# Patient Record
Sex: FEMALE | Race: WHITE | NOT HISPANIC OR LATINO | Employment: FULL TIME | URBAN - NONMETROPOLITAN AREA
[De-identification: names, ages, dates, MRNs, and addresses within clinical notes are randomized per-mention and may not be internally consistent; named-entity substitution may affect disease eponyms.]

---

## 2017-01-10 ENCOUNTER — OFFICE VISIT (OUTPATIENT)
Dept: OBSTETRICS AND GYNECOLOGY | Facility: CLINIC | Age: 52
End: 2017-01-10

## 2017-01-10 VITALS
WEIGHT: 208 LBS | HEIGHT: 66 IN | BODY MASS INDEX: 33.43 KG/M2 | DIASTOLIC BLOOD PRESSURE: 62 MMHG | SYSTOLIC BLOOD PRESSURE: 106 MMHG

## 2017-01-10 DIAGNOSIS — R63.5 WEIGHT GAIN: ICD-10-CM

## 2017-01-10 PROCEDURE — 99213 OFFICE O/P EST LOW 20 MIN: CPT | Performed by: NURSE PRACTITIONER

## 2017-01-10 RX ORDER — PHENTERMINE HYDROCHLORIDE 37.5 MG/1
37.5 CAPSULE ORAL EVERY MORNING
Qty: 30 CAPSULE | Refills: 0 | Status: SHIPPED | OUTPATIENT
Start: 2017-01-10 | End: 2018-02-27

## 2017-01-10 NOTE — PROGRESS NOTES
Subjective   Miranda Elliott is a 51 y.o. female.     HPI Comments: Miranda Elliott is a 51 y.o. female here for weight evaluation. She has been on Phentermine for 1 months. She has lost 6 pounds. She is doing well with her diet and exercise.       The following portions of the patient's history were reviewed and updated as appropriate: allergies, current medications, past family history, past medical history, past social history, past surgical history and problem list.    Review of Systems   Constitutional: Negative for activity change, appetite change, chills, diaphoresis, fatigue, fever and unexpected weight change.   HENT: Negative for congestion, ear discharge, ear pain, facial swelling, hearing loss, mouth sores, nosebleeds, postnasal drip, rhinorrhea, sinus pressure, sneezing, sore throat, tinnitus, trouble swallowing and voice change.    Eyes: Negative for photophobia, pain, discharge, redness, itching and visual disturbance.   Respiratory: Negative for apnea, cough, choking, chest tightness and shortness of breath.    Cardiovascular: Negative for chest pain, palpitations and leg swelling.   Gastrointestinal: Negative for abdominal distention, abdominal pain, anal bleeding, blood in stool, constipation, diarrhea, nausea, rectal pain and vomiting.   Endocrine: Negative for cold intolerance and heat intolerance.   Genitourinary: Negative for decreased urine volume, difficulty urinating, dyspareunia, flank pain, frequency, genital sores, hematuria, menstrual problem, pelvic pain, urgency, vaginal bleeding, vaginal discharge and vaginal pain.   Musculoskeletal: Negative for arthralgias, back pain, joint swelling and myalgias.   Skin: Negative for color change and rash.   Allergic/Immunologic: Negative for environmental allergies.   Neurological: Negative for dizziness, syncope, weakness, numbness and headaches.   Hematological: Negative for adenopathy.   Psychiatric/Behavioral: Negative for agitation, confusion  and sleep disturbance. The patient is not nervous/anxious.        Objective   Physical Exam   Constitutional: She is oriented to person, place, and time. She appears well-developed and well-nourished.   HENT:   Head: Normocephalic and atraumatic.   Eyes: Conjunctivae are normal. Right eye exhibits no discharge. Left eye exhibits no discharge.   Neck: Normal range of motion. Neck supple. No thyromegaly present.   Cardiovascular: Normal rate, regular rhythm and normal heart sounds.    Pulmonary/Chest: Effort normal and breath sounds normal.   Neurological: She is alert and oriented to person, place, and time.   Skin: Skin is warm and dry.   Psychiatric: She has a normal mood and affect. Her behavior is normal. Judgment and thought content normal.   Nursing note and vitals reviewed.      Assessment/Plan   Miranda was seen today for weight check.    Diagnoses and all orders for this visit:    Weight gain  Comments:  This is her 1st month on Phentermine. She has lost 6 pounds. Wishes to continue.  Orders:  -     phentermine 37.5 MG capsule; Take 1 capsule by mouth Every Morning.

## 2017-01-10 NOTE — MR AVS SNAPSHOT
Miranda Elliott   1/10/2017 1:00 PM   Office Visit    Dept Phone:  [phone]   Encounter #:  [id number]    Provider:  NELLI Chambers   Department:  [facility]                Your Full Care Plan              Where to Get Your Medications      You can get these medications from any pharmacy     Bring a paper prescription for each of these medications     phentermine 37.5 MG capsule            Your Updated Medication List          This list is accurate as of: 1/10/17  1:21 PM.  Always use your most recent med list.                ALPRAZolam 0.25 MG tablet   Commonly known as:  XANAX   Take 1 tablet by mouth 3 (Three) Times a Day As Needed for anxiety.       buPROPion  MG 24 hr tablet   Commonly known as:  WELLBUTRIN XL   Take 1 tablet by mouth Every Morning.       cyclobenzaprine 10 MG tablet   Commonly known as:  FLEXERIL   Take 0.5 tablets by mouth 3 (Three) Times a Day As Needed for muscle spasms.       phentermine 37.5 MG capsule   Take 1 capsule by mouth Every Morning.               You Were Diagnosed With        Codes Comments    Weight gain     ICD-10-CM: R63.5  ICD-9-CM: 783.1 This is her 1st month on Phentermine. She has lost 6 pounds. Wishes to continue.      Instructions     None    Patient Instructions History      Upcoming Appointments     Visit Type Date Time Department    OFFICE VISIT 1/10/2017  1:00 PM ADRIANNE Valdes Signup     [facility] [employer] allows you to send messages to your doctor, view your test results, renew your prescriptions, schedule appointments, and more. To sign up, go to [employer] and click on the Sign Up Now link in the New User? box. Enter your [employer] Activation Code exactly as it appears below along with the last four digits of your Social Security Number and your Date of Birth () to complete the sign-up process. If you do not sign up before the expiration date, you must request a  "new code.    [employer] Activation Code: [email or web address]  Expires: 1/24/2017  1:21 PM    If you have questions, you can email [email or web address] or call [phone] to talk to our [employer] staff. Remember, [employer] is NOT to be used for urgent needs. For medical emergencies, dial 911.               Other Info from Your Visit           Allergies     No Known Allergies      Reason for Visit     Weight Check Pt here for check on weight et check Phentermine. Down 6 lbs.      Vital Signs     Blood Pressure Height Weight Body Mass Index Smoking Status       106/62 66\" (167.6 cm) 208 lb (94.3 kg) 33.57 kg/m2 Current Every Day Smoker       Problems and Diagnoses Noted     Weight gain            "

## 2018-02-27 ENCOUNTER — OFFICE VISIT (OUTPATIENT)
Dept: OBSTETRICS AND GYNECOLOGY | Facility: CLINIC | Age: 53
End: 2018-02-27

## 2018-02-27 VITALS
WEIGHT: 215 LBS | HEIGHT: 66 IN | SYSTOLIC BLOOD PRESSURE: 116 MMHG | BODY MASS INDEX: 34.55 KG/M2 | DIASTOLIC BLOOD PRESSURE: 72 MMHG

## 2018-02-27 DIAGNOSIS — N95.1 MENOPAUSAL SYMPTOMS: Primary | ICD-10-CM

## 2018-02-27 DIAGNOSIS — R63.5 WEIGHT GAIN: ICD-10-CM

## 2018-02-27 DIAGNOSIS — Z12.39 SCREENING FOR BREAST CANCER: ICD-10-CM

## 2018-02-27 PROCEDURE — 99213 OFFICE O/P EST LOW 20 MIN: CPT | Performed by: NURSE PRACTITIONER

## 2018-02-27 RX ORDER — PHENTERMINE HYDROCHLORIDE 37.5 MG/1
37.5 CAPSULE ORAL EVERY MORNING
Qty: 30 CAPSULE | Refills: 0 | Status: SHIPPED | OUTPATIENT
Start: 2018-02-27 | End: 2018-03-29 | Stop reason: SDUPTHER

## 2018-02-27 RX ORDER — VARENICLINE TARTRATE 1 MG/1
1 TABLET, FILM COATED ORAL 2 TIMES DAILY
COMMUNITY
End: 2020-01-03

## 2018-02-27 NOTE — PROGRESS NOTES
"Subjective     Miranda Elliott is a 52 y.o. female    HPI Comments: Here for C/O menopausal symptoms. Having hot flashes, night sweats and vaginal dryness. Would like to start HRT. Also C/O weight gain.        /72  Ht 167.6 cm (66\")  Wt 97.5 kg (215 lb)  LMP 02/27/2014 (Approximate)  BMI 34.7 kg/m2    Outpatient Encounter Prescriptions as of 2/27/2018   Medication Sig Dispense Refill   • varenicline (CHANTIX) 1 MG tablet Take 1 mg by mouth 2 (Two) Times a Day.     • estrogen, conjugated,-medroxyprogesterone (PREMPRO) 0.45-1.5 MG per tablet Take 1 tablet by mouth Daily for 30 days. 30 tablet 1   • phentermine 37.5 MG capsule Take 1 capsule by mouth Every Morning. 30 capsule 0   • [DISCONTINUED] ALPRAZolam (XANAX) 0.25 MG tablet Take 1 tablet by mouth 3 (Three) Times a Day As Needed for anxiety. 90 tablet 0   • [DISCONTINUED] cyclobenzaprine (FLEXERIL) 10 MG tablet Take 0.5 tablets by mouth 3 (Three) Times a Day As Needed for muscle spasms. 90 tablet 3   • [DISCONTINUED] phentermine 37.5 MG capsule Take 1 capsule by mouth Every Morning. 30 capsule 0     No facility-administered encounter medications on file as of 2/27/2018.        Surgical History  Past Surgical History:   Procedure Laterality Date   • ADENOIDECTOMY     • BACK SURGERY     • COLONOSCOPY     • ENDOMETRIAL ABLATION     • SALPINGO OOPHORECTOMY     • TONSILLECTOMY     • TUBAL ABDOMINAL LIGATION         Family History  Family History   Problem Relation Age of Onset   • Diabetes Father    • No Known Problems Mother    • No Known Problems Sister    • Prostate cancer Maternal Grandfather    • Breast cancer Neg Hx    • Ovarian cancer Neg Hx    • Uterine cancer Neg Hx    • Colon cancer Neg Hx    • Melanoma Neg Hx        The following portions of the patient's history were reviewed and updated as appropriate: allergies, current medications, past family history, past medical history, past social history, past surgical history and problem list.    Review " of Systems   Constitutional: Positive for unexpected weight change. Negative for activity change, appetite change, chills, diaphoresis, fatigue and fever.   HENT: Negative for congestion, dental problem, drooling, ear discharge, ear pain, facial swelling, hearing loss, mouth sores, nosebleeds, postnasal drip, rhinorrhea, sinus pain, sinus pressure, sneezing, sore throat, tinnitus, trouble swallowing and voice change.    Eyes: Negative for photophobia, pain, discharge, redness, itching and visual disturbance.   Respiratory: Negative for apnea, cough, choking, chest tightness, shortness of breath, wheezing and stridor.    Cardiovascular: Negative for chest pain, palpitations and leg swelling.   Gastrointestinal: Negative for abdominal distention, abdominal pain, anal bleeding, blood in stool, constipation, diarrhea, nausea, rectal pain and vomiting.   Endocrine: Positive for heat intolerance. Negative for cold intolerance, polydipsia, polyphagia and polyuria.   Genitourinary: Negative for decreased urine volume, difficulty urinating, dyspareunia, dysuria, enuresis, flank pain, frequency, genital sores, hematuria, menstrual problem, pelvic pain, urgency, vaginal bleeding, vaginal discharge and vaginal pain.   Musculoskeletal: Negative for arthralgias, back pain, gait problem, joint swelling, myalgias, neck pain and neck stiffness.   Skin: Negative for color change, pallor, rash and wound.   Allergic/Immunologic: Negative for environmental allergies, food allergies and immunocompromised state.   Neurological: Negative for dizziness, tremors, seizures, syncope, facial asymmetry, speech difficulty, weakness, light-headedness, numbness and headaches.   Hematological: Negative for adenopathy. Does not bruise/bleed easily.   Psychiatric/Behavioral: Positive for agitation. Negative for behavioral problems, confusion, decreased concentration, dysphoric mood, hallucinations, self-injury, sleep disturbance and suicidal ideas.  The patient is nervous/anxious. The patient is not hyperactive.        Objective   Physical Exam   Constitutional: She is oriented to person, place, and time. She appears well-developed and well-nourished.   HENT:   Head: Normocephalic and atraumatic.   Eyes: Conjunctivae are normal. Right eye exhibits no discharge. Left eye exhibits no discharge.   Neck: Normal range of motion. Neck supple. No thyromegaly present.   Cardiovascular: Normal rate, regular rhythm and normal heart sounds.    Pulmonary/Chest: Effort normal and breath sounds normal.   Neurological: She is alert and oriented to person, place, and time.   Skin: Skin is warm and dry.   Psychiatric: She has a normal mood and affect. Her behavior is normal. Judgment and thought content normal.   Nursing note and vitals reviewed.      Assessment/Plan   Miranda was seen today for menopause.    Diagnoses and all orders for this visit:    Menopausal symptoms  Comments:  Pt will have her labs done and wishes to start Prempro. RX sent. She will RTO for yearly check up.   Orders:  -     Cortisol  -     DHEA-Sulfate  -     Estradiol  -     Follicle Stimulating Hormone  -     Luteinizing Hormone  -     Progesterone  -     Testosterone  -     CBC & Differential  -     Comprehensive Metabolic Panel  -     Lipid Panel With LDL / HDL Ratio  -     TSH  -     T3, Uptake  -     Vitamin D 25 Hydroxy  -     T4, Free  -     Hemoglobin A1c  -     UA / M With / Rflx Culture([facility] ONLY) - Urine, Clean Catch  -     estrogen, conjugated,-medroxyprogesterone (PREMPRO) 0.45-1.5 MG per tablet; Take 1 tablet by mouth Daily for 30 days.    Weight gain  Comments:  Pt wishes to restart Phentermine.   Orders:  -     phentermine 37.5 MG capsule; Take 1 capsule by mouth Every Morning.    Screening for breast cancer  Comments:  Mammo is scheduled at [facility].  Orders:  -     Mammo Screening Digital Tomosynthesis Bilateral With CAD; Future    BMI 34.0-34.9,adult  Pt. Is encouraged to begin an  exercise program and a healthy eating plan. Discussed LTL, weight watchers and other programs. Discussed Phentermine, Saxenda, and Contrave.    Patient's BMI is above normal parameters. Follow-up plan includes:  educational material, exercise counseling and nutrition counseling.      Luzmaria Calhoun, APRN  2/27/2018

## 2018-03-05 ENCOUNTER — HOSPITAL ENCOUNTER (OUTPATIENT)
Dept: MAMMOGRAPHY | Facility: HOSPITAL | Age: 53
Discharge: HOME | End: 2018-03-05
Admitting: NURSE PRACTITIONER

## 2018-03-05 DIAGNOSIS — Z12.39 SCREENING FOR BREAST CANCER: ICD-10-CM

## 2018-03-05 PROCEDURE — 77063 BREAST TOMOSYNTHESIS BI: CPT

## 2018-03-05 PROCEDURE — 77067 SCR MAMMO BI INCL CAD: CPT

## 2018-03-29 ENCOUNTER — OFFICE VISIT (OUTPATIENT)
Dept: OBSTETRICS AND GYNECOLOGY | Facility: CLINIC | Age: 53
End: 2018-03-29

## 2018-03-29 VITALS
HEIGHT: 66 IN | SYSTOLIC BLOOD PRESSURE: 102 MMHG | WEIGHT: 215 LBS | BODY MASS INDEX: 34.55 KG/M2 | DIASTOLIC BLOOD PRESSURE: 64 MMHG

## 2018-03-29 DIAGNOSIS — Z01.419 WELL WOMAN EXAM WITH ROUTINE GYNECOLOGICAL EXAM: Primary | ICD-10-CM

## 2018-03-29 DIAGNOSIS — N95.1 MENOPAUSAL SYMPTOMS: ICD-10-CM

## 2018-03-29 DIAGNOSIS — N89.8 VAGINAL IRRITATION: ICD-10-CM

## 2018-03-29 DIAGNOSIS — R63.5 WEIGHT GAIN: ICD-10-CM

## 2018-03-29 DIAGNOSIS — N89.8 VAGINAL DISCHARGE: ICD-10-CM

## 2018-03-29 PROCEDURE — 87512 GARDNER VAG DNA QUANT: CPT | Performed by: NURSE PRACTITIONER

## 2018-03-29 PROCEDURE — 87798 DETECT AGENT NOS DNA AMP: CPT | Performed by: NURSE PRACTITIONER

## 2018-03-29 PROCEDURE — 99396 PREV VISIT EST AGE 40-64: CPT | Performed by: NURSE PRACTITIONER

## 2018-03-29 PROCEDURE — 87661 TRICHOMONAS VAGINALIS AMPLIF: CPT | Performed by: NURSE PRACTITIONER

## 2018-03-29 PROCEDURE — 87481 CANDIDA DNA AMP PROBE: CPT | Performed by: NURSE PRACTITIONER

## 2018-03-29 PROCEDURE — G0123 SCREEN CERV/VAG THIN LAYER: HCPCS | Performed by: NURSE PRACTITIONER

## 2018-03-29 RX ORDER — NYSTATIN AND TRIAMCINOLONE ACETONIDE 100000; 1 [USP'U]/G; MG/G
OINTMENT TOPICAL 2 TIMES DAILY
Qty: 30 G | Refills: 3 | Status: SHIPPED | OUTPATIENT
Start: 2018-03-29 | End: 2020-01-03

## 2018-03-29 RX ORDER — PHENTERMINE HYDROCHLORIDE 37.5 MG/1
37.5 CAPSULE ORAL EVERY MORNING
Qty: 30 CAPSULE | Refills: 0 | Status: SHIPPED | OUTPATIENT
Start: 2018-03-29 | End: 2018-05-30

## 2018-03-29 NOTE — PROGRESS NOTES
"Subjective     Miranda Elliott is a 52 y.o. female    Here for yearly check up. Has C/O weight gain and fatigue. She has been on Phentermine for 1 months. She has lost 0 pounds. She is doing well with her diet, by making healthy eating choices and has been exercising 30 minutes per day. She is drinking at least 80 ounces of water per day. Also C/O vaginal irritation.      Gynecologic Exam   The patient's primary symptoms include genital itching and vaginal discharge. The patient's pertinent negatives include no pelvic pain or vaginal bleeding. This is a recurrent problem. The current episode started 1 to 4 weeks ago. The problem has been waxing and waning. The patient is experiencing no pain. Pertinent negatives include no abdominal pain, anorexia, back pain, chills, constipation, diarrhea, discolored urine, dysuria, fever, flank pain, frequency, headaches, hematuria, joint pain, joint swelling, nausea, painful intercourse, rash, sore throat, urgency or vomiting. The vaginal discharge was white. There has been no bleeding. Nothing aggravates the symptoms. She has tried nothing for the symptoms. She is sexually active. She is postmenopausal.         /64   Ht 167.6 cm (66\")   Wt 97.5 kg (215 lb)   LMP 03/29/2011 (Approximate) Comment: Ablation  BMI 34.70 kg/m²     Outpatient Encounter Prescriptions as of 3/29/2018   Medication Sig Dispense Refill   • estrogen, conjugated,-medroxyprogesterone (PREMPRO) 0.45-1.5 MG per tablet Take 1 tablet by mouth Daily for 30 days. 30 tablet 2   • phentermine 37.5 MG capsule Take 1 capsule by mouth Every Morning. 30 capsule 0   • [DISCONTINUED] estrogen, conjugated,-medroxyprogesterone (PREMPRO) 0.45-1.5 MG per tablet Take 1 tablet by mouth Daily for 30 days. 30 tablet 1   • [DISCONTINUED] phentermine 37.5 MG capsule Take 1 capsule by mouth Every Morning. 30 capsule 0   • nystatin-triamcinolone (MYCOLOG) 854295-1.1 UNIT/GM-% ointment Apply  topically 2 (Two) Times a Day. " 30 g 3   • varenicline (CHANTIX) 1 MG tablet Take 1 mg by mouth 2 (Two) Times a Day.       No facility-administered encounter medications on file as of 3/29/2018.        Surgical History  Past Surgical History:   Procedure Laterality Date   • ADENOIDECTOMY     • BACK SURGERY     • COLONOSCOPY     • ENDOMETRIAL ABLATION     • SALPINGO OOPHORECTOMY     • TONSILLECTOMY     • TUBAL ABDOMINAL LIGATION         Family History  Family History   Problem Relation Age of Onset   • Diabetes Father    • No Known Problems Mother    • No Known Problems Sister    • Prostate cancer Maternal Grandfather    • Breast cancer Neg Hx    • Ovarian cancer Neg Hx    • Uterine cancer Neg Hx    • Colon cancer Neg Hx    • Melanoma Neg Hx        The following portions of the patient's history were reviewed and updated as appropriate: allergies, current medications, past family history, past medical history, past social history, past surgical history and problem list.    Review of Systems   Constitutional: Positive for fatigue and unexpected weight change. Negative for activity change, appetite change, chills, diaphoresis and fever.   HENT: Negative for congestion, dental problem, drooling, ear discharge, ear pain, facial swelling, hearing loss, mouth sores, nosebleeds, postnasal drip, rhinorrhea, sinus pain, sinus pressure, sneezing, sore throat, tinnitus, trouble swallowing and voice change.    Eyes: Negative for photophobia, pain, discharge, redness, itching and visual disturbance.   Respiratory: Negative for apnea, cough, choking, chest tightness, shortness of breath, wheezing and stridor.    Cardiovascular: Negative for chest pain, palpitations and leg swelling.   Gastrointestinal: Negative for abdominal distention, abdominal pain, anal bleeding, anorexia, blood in stool, constipation, diarrhea, nausea, rectal pain and vomiting.   Endocrine: Negative for cold intolerance, heat intolerance, polydipsia, polyphagia and polyuria.    Genitourinary: Positive for vaginal discharge. Negative for decreased urine volume, difficulty urinating, dyspareunia, dysuria, enuresis, flank pain, frequency, genital sores, hematuria, menstrual problem, pelvic pain, urgency, vaginal bleeding and vaginal pain.   Musculoskeletal: Negative for arthralgias, back pain, gait problem, joint pain, joint swelling, myalgias, neck pain and neck stiffness.   Skin: Negative for color change, pallor, rash and wound.   Allergic/Immunologic: Negative for environmental allergies, food allergies and immunocompromised state.   Neurological: Negative for dizziness, tremors, seizures, syncope, facial asymmetry, speech difficulty, weakness, light-headedness, numbness and headaches.   Hematological: Negative for adenopathy. Does not bruise/bleed easily.   Psychiatric/Behavioral: Negative for agitation, behavioral problems, confusion, decreased concentration, dysphoric mood, hallucinations, self-injury, sleep disturbance and suicidal ideas. The patient is not nervous/anxious and is not hyperactive.        Objective   Physical Exam   Constitutional: She is oriented to person, place, and time. She appears well-developed and well-nourished. No distress.   HENT:   Head: Normocephalic.   Right Ear: External ear normal.   Left Ear: External ear normal.   Nose: Nose normal.   Mouth/Throat: Oropharynx is clear and moist.   Eyes: Conjunctivae are normal. Right eye exhibits no discharge. Left eye exhibits no discharge. No scleral icterus.   Neck: Normal range of motion. Neck supple. Carotid bruit is not present. No tracheal deviation present. No thyromegaly present.   Cardiovascular: Normal rate, regular rhythm, normal heart sounds and intact distal pulses.    No murmur heard.  Pulmonary/Chest: Effort normal and breath sounds normal. No respiratory distress. She has no wheezes. Right breast exhibits no inverted nipple, no mass, no nipple discharge, no skin change and no tenderness. Left breast  exhibits no inverted nipple, no mass, no nipple discharge, no skin change and no tenderness. Breasts are symmetrical. There is no breast swelling.   Abdominal: Soft. She exhibits no distension and no mass. There is no tenderness. There is no guarding. No hernia. Hernia confirmed negative in the right inguinal area and confirmed negative in the left inguinal area.   Genitourinary: Rectum normal and uterus normal. Rectal exam shows no mass.       No breast tenderness, discharge or bleeding. Pelvic exam was performed with patient supine. There is no rash, tenderness, lesion or injury on the right labia. There is no rash, tenderness, lesion or injury on the left labia. Uterus is not enlarged, not fixed and not tender. Cervix exhibits no motion tenderness, no discharge and no friability. Right adnexum displays no mass, no tenderness and no fullness. Left adnexum displays no mass, no tenderness and no fullness. No erythema, tenderness or bleeding in the vagina. No foreign body in the vagina. No signs of injury around the vagina. Vaginal discharge found.   Genitourinary Comments:   BSU normal  Urethral meatus  Normal  Perineum  Normal   Musculoskeletal: Normal range of motion. She exhibits no edema or tenderness.   Lymphadenopathy:        Head (right side): No submental, no submandibular, no tonsillar, no preauricular, no posterior auricular and no occipital adenopathy present.        Head (left side): No submental, no submandibular, no tonsillar, no preauricular, no posterior auricular and no occipital adenopathy present.     She has no cervical adenopathy.        Right cervical: No superficial cervical, no deep cervical and no posterior cervical adenopathy present.       Left cervical: No superficial cervical, no deep cervical and no posterior cervical adenopathy present.     She has no axillary adenopathy.        Right: No inguinal adenopathy present.        Left: No inguinal adenopathy present.   Neurological: She is  alert and oriented to person, place, and time. Coordination normal.   Skin: Skin is warm and dry. No bruising and no rash noted. She is not diaphoretic. No erythema.   Psychiatric: She has a normal mood and affect. Her behavior is normal. Judgment and thought content normal.   Nursing note and vitals reviewed.      Assessment/Plan   Miranda was seen today for gynecologic exam.    Diagnoses and all orders for this visit:    Well woman exam with routine gynecological exam  Normal GYN exam. Her lab work was already done. Encouraged SBE, pt is aware how to do self breast exam and the importance of same. Discussed weight management and importance of maintaining a healthy weight. Discussed Vitamin D intake and the importance of adequate vitamin D for both Bone Health and a healthy immune system.  Discussed Daily exercise and the importance of same, in regards to a healthy heart as well as helping to maintain her weight and improving her mental health.  BMI 34.7.  Colonoscopy is up to date.  Mammogram was already done and was normal.  Pap smear is done per [facility] guidelines.  -     Liquid-based Pap Smear, Screening    Menopausal symptoms  Comments:  Her hot flashes are improved on the Prempro. But not as well as she had hoped. Will continue for another month and re-evaluate.   Orders:  -     estrogen, conjugated,-medroxyprogesterone (PREMPRO) 0.45-1.5 MG per tablet; Take 1 tablet by mouth Daily for 30 days.    Weight gain  Comments:  This is her first month on Phentermine. She has not lost any weight. Will have Insulin and Testosterone drawn.  Orders:  -     Insulin, Total  -     Testosterone  -     phentermine 37.5 MG capsule; Take 1 capsule by mouth Every Morning.    Vaginal discharge  Comments:  BV panel added to pap.    Vaginal irritation  Comments:  External vulva is irritated. Will try Mycolog.  Orders:  -     nystatin-triamcinolone (MYCOLOG) 825645-2.1 UNIT/GM-% ointment; Apply  topically 2 (Two) Times a Day.    BMI  34.0-34.9,adult  Pt. Is encouraged to begin an exercise program and a healthy eating plan. Discussed LTL, weight watchers and other programs. Discussed Phentermine, Saxenda, and Contrave. She is currently on Phentermine.    Discussed the patient's BMI with her. BMI is above normal parameters. Follow-up plan includes:  educational material, exercise counseling, no follow-up required and pharmacological intervention.      Luzmaria Calhoun, APRN  3/29/2018

## 2018-03-30 ENCOUNTER — APPOINTMENT (OUTPATIENT)
Dept: LAB | Facility: HOSPITAL | Age: 53
End: 2018-03-30

## 2018-03-30 PROCEDURE — 84403 ASSAY OF TOTAL TESTOSTERONE: CPT | Performed by: NURSE PRACTITIONER

## 2018-03-30 PROCEDURE — 83525 ASSAY OF INSULIN: CPT | Performed by: NURSE PRACTITIONER

## 2018-03-30 PROCEDURE — 36415 COLL VENOUS BLD VENIPUNCTURE: CPT | Performed by: NURSE PRACTITIONER

## 2018-03-31 LAB
INSULIN SERPL-ACNC: 11.1 UIU/ML (ref 2.6–24.9)
TESTOST SERPL-MCNC: 7 NG/DL (ref 3–41)

## 2018-04-03 DIAGNOSIS — B96.89 BV (BACTERIAL VAGINOSIS): Primary | ICD-10-CM

## 2018-04-03 DIAGNOSIS — A49.3 MYCOPLASMA INFECTION: ICD-10-CM

## 2018-04-03 DIAGNOSIS — N76.0 BV (BACTERIAL VAGINOSIS): Primary | ICD-10-CM

## 2018-04-03 LAB
GEN CATEG CVX/VAG CYTO-IMP: NORMAL
LAB AP CASE REPORT: NORMAL
LAB AP GYN ADDITIONAL INFORMATION: NORMAL
LAB AP GYN OTHER FINDINGS: NORMAL
Lab: NORMAL
PATH INTERP SPEC-IMP: NORMAL
STAT OF ADQ CVX/VAG CYTO-IMP: NORMAL

## 2018-04-03 RX ORDER — DOXYCYCLINE 100 MG/1
100 CAPSULE ORAL EVERY 12 HOURS SCHEDULED
Qty: 20 CAPSULE | Refills: 0 | Status: SHIPPED | OUTPATIENT
Start: 2018-04-03 | End: 2018-04-13

## 2018-04-03 RX ORDER — METRONIDAZOLE 500 MG/1
500 TABLET ORAL 2 TIMES DAILY
Qty: 14 TABLET | Refills: 0 | Status: SHIPPED | OUTPATIENT
Start: 2018-04-03 | End: 2018-04-10

## 2018-04-25 ENCOUNTER — OFFICE VISIT (OUTPATIENT)
Dept: OBSTETRICS AND GYNECOLOGY | Facility: CLINIC | Age: 53
End: 2018-04-25

## 2018-04-25 VITALS
WEIGHT: 220 LBS | BODY MASS INDEX: 35.36 KG/M2 | DIASTOLIC BLOOD PRESSURE: 66 MMHG | SYSTOLIC BLOOD PRESSURE: 118 MMHG | HEIGHT: 66 IN

## 2018-04-25 DIAGNOSIS — F17.200 SMOKER: Primary | ICD-10-CM

## 2018-04-25 DIAGNOSIS — R63.5 WEIGHT GAIN: ICD-10-CM

## 2018-04-25 PROCEDURE — 99213 OFFICE O/P EST LOW 20 MIN: CPT | Performed by: NURSE PRACTITIONER

## 2018-04-25 RX ORDER — PHENTERMINE HYDROCHLORIDE 37.5 MG/1
37.5 CAPSULE ORAL EVERY MORNING
Qty: 30 CAPSULE | Refills: 0 | Status: CANCELLED | OUTPATIENT
Start: 2018-04-25

## 2018-04-25 NOTE — PROGRESS NOTES
Attempted to obtain health maintenance information, patient unable to provide answers for the following items Tdap, Pneumococcal Vaccine, Diabetic Eye Exam, Diabetic Foot Exam, HPV Vaccine, Chlamydia Screening  and Zoster Vaccine.

## 2018-04-25 NOTE — PROGRESS NOTES
"Subjective     Miranda Elliott is a 52 y.o. female    Miranda Elliott is a 52 y.o. female here for weight evaluation. She has been on Phentermine for 2 months. She has gained 5 pounds. She is doing well with her diet, by making healthy eating choices and has been exercising 30 minutes per day. She is drinking at least 80 ounces of water per day. Wishes to try something else.          /66   Ht 167.6 cm (66\")   Wt 99.8 kg (220 lb)   LMP 04/25/2015 (Approximate) Comment: postmenopausal  BMI 35.51 kg/m²     Outpatient Encounter Prescriptions as of 4/25/2018   Medication Sig Dispense Refill   • clotrimazole-betamethasone (LOTRISONE) 1-0.05 % cream Apply topically affected area 2 or 3 times daily 30 g 2   • estrogen, conjugated,-medroxyprogesterone (PREMPRO) 0.45-1.5 MG per tablet Take 1 tablet by mouth Daily for 30 days. 30 tablet 2   • phentermine 37.5 MG capsule Take 1 capsule by mouth Every Morning. 30 capsule 0   • varenicline (CHANTIX) 1 MG tablet Take 1 mg by mouth 2 (Two) Times a Day.     • Insulin Pen Needle 32G X 6 MM misc 1 Device Daily. 30 each 3   • Liraglutide -Weight Management (SAXENDA) 18 MG/3ML solution pen-injector Inject 0.6 mg under the skin Daily. Use as directed, increasing the dose until the maintance dose of 3 mg 5 pen 3   • nystatin-triamcinolone (MYCOLOG) 100965-3.1 UNIT/GM-% ointment Apply  topically 2 (Two) Times a Day. 30 g 3     No facility-administered encounter medications on file as of 4/25/2018.        Surgical History  Past Surgical History:   Procedure Laterality Date   • ADENOIDECTOMY     • BACK SURGERY     • COLONOSCOPY     • ENDOMETRIAL ABLATION     • SALPINGO OOPHORECTOMY     • TONSILLECTOMY     • TUBAL ABDOMINAL LIGATION         Family History  Family History   Problem Relation Age of Onset   • Diabetes Father    • No Known Problems Mother    • No Known Problems Sister    • Prostate cancer Maternal Grandfather    • Breast cancer Neg Hx    • Ovarian cancer Neg Hx    • " Uterine cancer Neg Hx    • Colon cancer Neg Hx    • Melanoma Neg Hx        The following portions of the patient's history were reviewed and updated as appropriate: allergies, current medications, past family history, past medical history, past social history, past surgical history and problem list.    Review of Systems   Constitutional: Positive for unexpected weight change. Negative for activity change, appetite change, chills, diaphoresis, fatigue and fever.   HENT: Negative for congestion, dental problem, drooling, ear discharge, ear pain, facial swelling, hearing loss, mouth sores, nosebleeds, postnasal drip, rhinorrhea, sinus pain, sinus pressure, sneezing, sore throat, tinnitus, trouble swallowing and voice change.    Eyes: Negative for photophobia, pain, discharge, redness, itching and visual disturbance.   Respiratory: Negative for apnea, cough, choking, chest tightness, shortness of breath, wheezing and stridor.    Cardiovascular: Negative for chest pain, palpitations and leg swelling.   Gastrointestinal: Negative for abdominal distention, abdominal pain, anal bleeding, blood in stool, constipation, diarrhea, nausea, rectal pain and vomiting.   Endocrine: Negative for cold intolerance, heat intolerance, polydipsia, polyphagia and polyuria.   Genitourinary: Negative for decreased urine volume, difficulty urinating, dyspareunia, dysuria, enuresis, flank pain, frequency, genital sores, hematuria, menstrual problem, pelvic pain, urgency, vaginal bleeding, vaginal discharge and vaginal pain.   Musculoskeletal: Negative for arthralgias, back pain, gait problem, joint swelling, myalgias, neck pain and neck stiffness.   Skin: Negative for color change, pallor, rash and wound.   Allergic/Immunologic: Negative for environmental allergies, food allergies and immunocompromised state.   Neurological: Negative for dizziness, tremors, seizures, syncope, facial asymmetry, speech difficulty, weakness, light-headedness,  numbness and headaches.   Hematological: Negative for adenopathy. Does not bruise/bleed easily.   Psychiatric/Behavioral: Negative for agitation, behavioral problems, confusion, decreased concentration, dysphoric mood, hallucinations, self-injury, sleep disturbance and suicidal ideas. The patient is not nervous/anxious and is not hyperactive.        Objective   Physical Exam   Constitutional: She is oriented to person, place, and time. She appears well-developed and well-nourished.   HENT:   Head: Normocephalic and atraumatic.   Eyes: Conjunctivae are normal. Right eye exhibits no discharge. Left eye exhibits no discharge.   Neck: Normal range of motion. Neck supple. No thyromegaly present.   Cardiovascular: Normal rate, regular rhythm and normal heart sounds.    Pulmonary/Chest: Effort normal and breath sounds normal.   Neurological: She is alert and oriented to person, place, and time.   Skin: Skin is warm and dry.   Psychiatric: She has a normal mood and affect. Her behavior is normal. Judgment and thought content normal.   Nursing note and vitals reviewed.      Assessment/Plan   Miranda was seen today for weight check.    Diagnoses and all orders for this visit:    Smoker  The patient is counseled regarding smoking cessation. She is given information on the consequences of smoking and her health. We discussed the risks of smoking affecting those around her. We discussed CXR and the new CT of the chest for evaluation of her lungs. She is instructed about the smoking cessation class offered by [facility]. We discussed this face to face for 3-5 minutes and she is given a handout regarding the class at [facility].      Weight gain  Comments:  This is her 2nd month on Phentermine. She has gained 5 pounds. Will stop Phentermine and try Saxenda.  Orders:  -     Liraglutide -Weight Management (SAXENDA) 18 MG/3ML solution pen-injector; Inject 0.6 mg under the skin Daily. Use as directed, increasing the dose until the maintance dose of  3 mg  -     Insulin Pen Needle 32G X 6 MM misc; 1 Device Daily.    Other orders  -     Cancel: phentermine 37.5 MG capsule; Take 1 capsule by mouth Every Morning.       BMI 35.5  Patient's Body mass index is 35.51 kg/m². BMI is above normal parameters. Follow-up plan includes:  educational material, exercise counseling, nutrition counseling and pharmacological intervention.      Luzmaria Calhoun, APRN  4/25/2018

## 2018-04-25 NOTE — PATIENT INSTRUCTIONS

## 2018-05-02 ENCOUNTER — PRIOR AUTHORIZATION (OUTPATIENT)
Dept: OBSTETRICS AND GYNECOLOGY | Facility: CLINIC | Age: 53
End: 2018-05-02

## 2018-05-30 ENCOUNTER — OFFICE VISIT (OUTPATIENT)
Dept: OBSTETRICS AND GYNECOLOGY | Facility: CLINIC | Age: 53
End: 2018-05-30

## 2018-05-30 VITALS
HEIGHT: 66 IN | WEIGHT: 212 LBS | SYSTOLIC BLOOD PRESSURE: 110 MMHG | BODY MASS INDEX: 34.07 KG/M2 | DIASTOLIC BLOOD PRESSURE: 66 MMHG

## 2018-05-30 DIAGNOSIS — R63.5 WEIGHT GAIN: Primary | ICD-10-CM

## 2018-05-30 DIAGNOSIS — F17.200 SMOKER: ICD-10-CM

## 2018-05-30 PROCEDURE — 99212 OFFICE O/P EST SF 10 MIN: CPT | Performed by: NURSE PRACTITIONER

## 2018-05-30 PROCEDURE — 99406 BEHAV CHNG SMOKING 3-10 MIN: CPT | Performed by: NURSE PRACTITIONER

## 2018-05-30 NOTE — PROGRESS NOTES
"Subjective     Miranda Elliott is a 52 y.o. female    Here for follow up of Saxenda. She has been on it for 1 month and has lost 8 pounds. Wishes to continue.    Other   This is a recurrent problem. The current episode started more than 1 month ago. The problem has been gradually improving. Pertinent negatives include no abdominal pain, anorexia, arthralgias, change in bowel habit, chest pain, chills, congestion, coughing, diaphoresis, fatigue, fever, headaches, joint swelling, myalgias, nausea, neck pain, numbness, rash, sore throat, swollen glands, urinary symptoms, vertigo, visual change, vomiting or weakness. Nothing aggravates the symptoms.         /66   Ht 167.6 cm (66\")   Wt 96.2 kg (212 lb)   LMP 05/30/2010 (Approximate) Comment: Bleeding  BMI 34.22 kg/m²     Outpatient Encounter Prescriptions as of 5/30/2018   Medication Sig Dispense Refill   • clotrimazole-betamethasone (LOTRISONE) 1-0.05 % cream Apply topically affected area 2 or 3 times daily 30 g 2   • estrogen, conjugated,-medroxyprogesterone (PREMPRO) 0.45-1.5 MG per tablet Take 1 tablet by mouth Daily 30 tablet 2   • Insulin Pen Needle 32G X 6 MM misc 1 Device Daily. 30 each 3   • Liraglutide -Weight Management (SAXENDA) 18 MG/3ML solution pen-injector Inject 0.6 mg under the skin Daily. Use as directed, increasing the dose until the maintance dose of 3 mg 5 pen 3   • nystatin-triamcinolone (MYCOLOG) 228645-3.1 UNIT/GM-% ointment Apply  topically 2 (Two) Times a Day. 30 g 3   • [DISCONTINUED] phentermine 37.5 MG capsule Take 1 capsule by mouth Every Morning. 30 capsule 0   • varenicline (CHANTIX) 1 MG tablet Take 1 mg by mouth 2 (Two) Times a Day.       No facility-administered encounter medications on file as of 5/30/2018.        Surgical History  Past Surgical History:   Procedure Laterality Date   • ADENOIDECTOMY     • BACK SURGERY     • COLONOSCOPY     • ENDOMETRIAL ABLATION     • SALPINGO OOPHORECTOMY     • TONSILLECTOMY     • TUBAL " ABDOMINAL LIGATION         Family History  Family History   Problem Relation Age of Onset   • Diabetes Father    • No Known Problems Mother    • No Known Problems Sister    • Prostate cancer Maternal Grandfather    • Breast cancer Neg Hx    • Ovarian cancer Neg Hx    • Uterine cancer Neg Hx    • Colon cancer Neg Hx    • Melanoma Neg Hx        The following portions of the patient's history were reviewed and updated as appropriate: allergies, current medications, past family history, past medical history, past social history, past surgical history and problem list.    Review of Systems   Constitutional: Negative for activity change, appetite change, chills, diaphoresis, fatigue, fever, unexpected weight gain and unexpected weight loss.   HENT: Negative for congestion, dental problem, drooling, ear discharge, ear pain, facial swelling, hearing loss, mouth sores, nosebleeds, postnasal drip, rhinorrhea, sinus pressure, sneezing, sore throat, tinnitus, trouble swallowing and voice change.    Eyes: Negative for blurred vision, double vision, photophobia, pain, discharge, redness, itching and visual disturbance.   Respiratory: Negative for apnea, cough, choking, chest tightness, shortness of breath, wheezing and stridor.    Cardiovascular: Negative for chest pain, palpitations and leg swelling.   Gastrointestinal: Negative for abdominal distention, abdominal pain, anal bleeding, anorexia, blood in stool, change in bowel habit, constipation, diarrhea, nausea, rectal pain, vomiting, GERD and indigestion.   Endocrine: Negative for cold intolerance, heat intolerance, polydipsia, polyphagia, polyuria and breast discharge.   Genitourinary: Negative for urinary incontinence, decreased libido, decreased urine volume, difficulty urinating, dyspareunia, dysuria, flank pain, frequency, genital sores, hematuria, menstrual problem, pelvic pain, urgency, vaginal bleeding, vaginal discharge, vaginal pain, breast lump and breast pain.    Musculoskeletal: Negative for arthralgias, back pain, gait problem, joint swelling, myalgias, neck pain, neck stiffness and bursitis.   Skin: Negative for color change, dry skin and rash.   Allergic/Immunologic: Negative for environmental allergies, food allergies and immunocompromised state.   Neurological: Negative for dizziness, vertigo, tremors, seizures, syncope, facial asymmetry, speech difficulty, weakness, light-headedness, numbness, headache, memory problem and confusion.   Hematological: Negative for adenopathy. Does not bruise/bleed easily.   Psychiatric/Behavioral: Negative for agitation, behavioral problems, decreased concentration, dysphoric mood, hallucinations, self-injury, sleep disturbance, suicidal ideas, negative for hyperactivity, depressed mood and stress. The patient is not nervous/anxious.        Objective   Physical Exam   Constitutional: She is oriented to person, place, and time. She appears well-developed and well-nourished.   HENT:   Head: Normocephalic and atraumatic.   Eyes: Conjunctivae are normal. Right eye exhibits no discharge. Left eye exhibits no discharge.   Neck: Normal range of motion. Neck supple. No thyromegaly present.   Cardiovascular: Normal rate, regular rhythm and normal heart sounds.    Pulmonary/Chest: Effort normal and breath sounds normal.   Neurological: She is alert and oriented to person, place, and time.   Skin: Skin is warm and dry.   Psychiatric: She has a normal mood and affect. Her behavior is normal. Judgment and thought content normal.   Nursing note and vitals reviewed.      Assessment/Plan   Miranda was seen today for weight check.    Diagnoses and all orders for this visit:    Weight gain  Comments:  Pt has been on Saxenda for 1 month. Has lost 8 pounds. Is not having any side effects.     Smoker  The patient is counseled regarding smoking cessation. She is given information on the consequences of smoking and her health. We discussed the risks of  smoking affecting those around her. We discussed CXR and the new CT of the chest for evaluation of her lungs. She is instructed about the smoking cessation class offered by [facility]. We discussed this face to face for 3-5 minutes and she is given a handout regarding the class at [facility]. Has been on Chantix, but has stopped it. Encouraged to restart.         Patient's Body mass index is 34.22 kg/m². BMI is above normal parameters. Recommendations include: educational material, exercise counseling, nutrition counseling and pharmacological intervention.      Luzmaria Calhoun, APRN  5/30/2018

## 2018-05-30 NOTE — PATIENT INSTRUCTIONS

## 2019-06-10 ENCOUNTER — TRANSCRIBE ORDERS (OUTPATIENT)
Dept: ADMINISTRATIVE | Facility: HOSPITAL | Age: 54
End: 2019-06-10

## 2019-06-10 DIAGNOSIS — R73.01 IMPAIRED FASTING GLUCOSE: Primary | ICD-10-CM

## 2020-01-03 ENCOUNTER — OFFICE VISIT (OUTPATIENT)
Dept: OBSTETRICS AND GYNECOLOGY | Facility: CLINIC | Age: 55
End: 2020-01-03

## 2020-01-03 VITALS
DIASTOLIC BLOOD PRESSURE: 78 MMHG | HEIGHT: 66 IN | SYSTOLIC BLOOD PRESSURE: 118 MMHG | BODY MASS INDEX: 38.41 KG/M2 | WEIGHT: 239 LBS

## 2020-01-03 DIAGNOSIS — R21 RASH: ICD-10-CM

## 2020-01-03 DIAGNOSIS — N89.8 VAGINAL DISCHARGE: ICD-10-CM

## 2020-01-03 DIAGNOSIS — Z12.31 ENCOUNTER FOR SCREENING MAMMOGRAM FOR BREAST CANCER: ICD-10-CM

## 2020-01-03 DIAGNOSIS — Z01.419 WELL WOMAN EXAM WITH ROUTINE GYNECOLOGICAL EXAM: Primary | ICD-10-CM

## 2020-01-03 DIAGNOSIS — F17.200 SMOKER: ICD-10-CM

## 2020-01-03 DIAGNOSIS — F32.9 REACTIVE DEPRESSION: ICD-10-CM

## 2020-01-03 DIAGNOSIS — Z13.820 ENCOUNTER FOR SCREENING FOR OSTEOPOROSIS: ICD-10-CM

## 2020-01-03 DIAGNOSIS — R14.0 BLOATING: ICD-10-CM

## 2020-01-03 PROCEDURE — 87512 GARDNER VAG DNA QUANT: CPT | Performed by: NURSE PRACTITIONER

## 2020-01-03 PROCEDURE — 99396 PREV VISIT EST AGE 40-64: CPT | Performed by: NURSE PRACTITIONER

## 2020-01-03 PROCEDURE — 87563 M. GENITALIUM AMP PROBE: CPT | Performed by: NURSE PRACTITIONER

## 2020-01-03 PROCEDURE — 87624 HPV HI-RISK TYP POOLED RSLT: CPT | Performed by: NURSE PRACTITIONER

## 2020-01-03 PROCEDURE — 87481 CANDIDA DNA AMP PROBE: CPT | Performed by: NURSE PRACTITIONER

## 2020-01-03 PROCEDURE — 87798 DETECT AGENT NOS DNA AMP: CPT | Performed by: NURSE PRACTITIONER

## 2020-01-03 PROCEDURE — 87661 TRICHOMONAS VAGINALIS AMPLIF: CPT | Performed by: NURSE PRACTITIONER

## 2020-01-03 PROCEDURE — G0123 SCREEN CERV/VAG THIN LAYER: HCPCS | Performed by: NURSE PRACTITIONER

## 2020-01-03 RX ORDER — BUPROPION HYDROCHLORIDE 150 MG/1
150 TABLET ORAL EVERY MORNING
Qty: 30 TABLET | Refills: 12 | Status: SHIPPED | OUTPATIENT
Start: 2020-01-03 | End: 2021-04-21

## 2020-01-03 NOTE — PROGRESS NOTES
"Subjective     Miranda Elliott is a 54 y.o. female    Patient is here for yearly checkup.  She has complaints of bloating and depression.    Gynecologic Exam   The patient's primary symptoms include pelvic pain and vaginal discharge. The patient's pertinent negatives include no vaginal bleeding. The patient is experiencing no pain. Pertinent negatives include no abdominal pain, anorexia, back pain, chills, constipation, diarrhea, discolored urine, dysuria, fever, flank pain, frequency, headaches, hematuria, joint pain, joint swelling, nausea, painful intercourse, rash, sore throat, urgency or vomiting. She is sexually active. She is postmenopausal.         /78   Ht 167.6 cm (66\")   Wt 108 kg (239 lb)   LMP  (LMP Unknown) Comment: ablation  Breastfeeding No   BMI 38.58 kg/m²     Outpatient Encounter Medications as of 1/3/2020   Medication Sig Dispense Refill   • buPROPion XL (WELLBUTRIN XL) 150 MG 24 hr tablet Take 1 tablet by mouth Every Morning. 30 tablet 12   • Insulin Pen Needle 32G X 6 MM misc 1 Device Daily. 30 each 3   • Liraglutide -Weight Management (SAXENDA) 18 MG/3ML solution pen-injector Inject 0.6 mg under the skin Daily. Use as directed, increasing the dose until the maintance dose of 3 mg 5 pen 3   • nystatin-triamcinolone (MYCOLOG II) 724868-0.1 UNIT/GM-% cream Apply  topically to the appropriate area as directed 2 (Two) Times a Day. 60 g 3   • varenicline (CHANTIX STARTING MONTH PAK) 0.5 MG X 11 & 1 MG X 42 tablet Take 0.5 mg one daily on days 1-3 and and 0.5 mg twice daily on days 4-7.Then 1 mg twice daily for a total of 12 weeks. 53 tablet 1   • [DISCONTINUED] clotrimazole-betamethasone (LOTRISONE) 1-0.05 % cream Apply topically affected area 2 or 3 times daily 30 g 2   • [DISCONTINUED] nystatin-triamcinolone (MYCOLOG) 196944-0.1 UNIT/GM-% ointment Apply  topically 2 (Two) Times a Day. 30 g 3   • [DISCONTINUED] varenicline (CHANTIX) 1 MG tablet Take 1 mg by mouth 2 (Two) Times a Day.   "     No facility-administered encounter medications on file as of 1/3/2020.        Surgical History  Past Surgical History:   Procedure Laterality Date   • ADENOIDECTOMY     • BACK SURGERY     • COLONOSCOPY  2012   • ENDOMETRIAL ABLATION     • SALPINGO OOPHORECTOMY     • TONSILLECTOMY     • TUBAL ABDOMINAL LIGATION         Family History  Family History   Problem Relation Age of Onset   • Diabetes Father    • No Known Problems Mother    • No Known Problems Sister    • Prostate cancer Maternal Grandfather    • Breast cancer Neg Hx    • Ovarian cancer Neg Hx    • Uterine cancer Neg Hx    • Colon cancer Neg Hx    • Melanoma Neg Hx        The following portions of the patient's history were reviewed and updated as appropriate: allergies, current medications, past family history, past medical history, past social history, past surgical history and problem list.    Review of Systems   Constitutional: Negative for activity change, appetite change, chills, diaphoresis, fatigue, fever, unexpected weight gain and unexpected weight loss.   HENT: Negative for congestion, dental problem, drooling, ear discharge, ear pain, facial swelling, hearing loss, mouth sores, nosebleeds, postnasal drip, rhinorrhea, sinus pressure, sneezing, sore throat, swollen glands, tinnitus, trouble swallowing and voice change.    Eyes: Negative for blurred vision, double vision, photophobia, pain, discharge, redness, itching and visual disturbance.   Respiratory: Negative for apnea, cough, choking, chest tightness, shortness of breath, wheezing and stridor.    Cardiovascular: Negative for chest pain, palpitations and leg swelling.   Gastrointestinal: Negative for abdominal distention, abdominal pain, anal bleeding, anorexia, blood in stool, constipation, diarrhea, nausea, rectal pain, vomiting, GERD and indigestion.   Endocrine: Negative for cold intolerance, heat intolerance, polydipsia, polyphagia and polyuria.   Genitourinary: Positive for pelvic  pain and vaginal discharge. Negative for amenorrhea, breast discharge, breast lump, breast pain, decreased libido, decreased urine volume, difficulty urinating, dyspareunia, dysuria, flank pain, frequency, genital sores, hematuria, menstrual problem, pelvic pressure, urgency, urinary incontinence, vaginal bleeding and vaginal pain.   Musculoskeletal: Negative for arthralgias, back pain, gait problem, joint pain, joint swelling, myalgias, neck pain, neck stiffness and bursitis.   Skin: Negative for color change, dry skin and rash.   Allergic/Immunologic: Negative for environmental allergies, food allergies and immunocompromised state.   Neurological: Negative for dizziness, tremors, seizures, syncope, facial asymmetry, speech difficulty, weakness, light-headedness, numbness, headache, memory problem and confusion.   Hematological: Negative for adenopathy. Does not bruise/bleed easily.   Psychiatric/Behavioral: Positive for depressed mood. Negative for agitation, behavioral problems, decreased concentration, dysphoric mood, hallucinations, self-injury, sleep disturbance, suicidal ideas, negative for hyperactivity and stress. The patient is not nervous/anxious.        Objective   Physical Exam   Constitutional: She is oriented to person, place, and time. She appears well-developed and well-nourished. No distress.   HENT:   Head: Normocephalic.   Right Ear: External ear normal.   Left Ear: External ear normal.   Nose: Nose normal.   Mouth/Throat: Oropharynx is clear and moist.   Eyes: Conjunctivae are normal. Right eye exhibits no discharge. Left eye exhibits no discharge. No scleral icterus.   Neck: Normal range of motion. Neck supple. Carotid bruit is not present. No tracheal deviation present. No thyromegaly present.   Cardiovascular: Normal rate, regular rhythm, normal heart sounds and intact distal pulses.   No murmur heard.  Pulmonary/Chest: Effort normal and breath sounds normal. No respiratory distress. She has  no wheezes. Right breast exhibits no inverted nipple, no mass, no nipple discharge, no skin change and no tenderness. Left breast exhibits no inverted nipple, no mass, no nipple discharge, no skin change and no tenderness. No breast swelling, tenderness, discharge or bleeding. Breasts are symmetrical.   Abdominal: Soft. She exhibits no distension and no mass. There is no tenderness. There is no guarding. No hernia. Hernia confirmed negative in the right inguinal area and confirmed negative in the left inguinal area.   Genitourinary: Rectum normal and uterus normal. Rectal exam shows no mass. No breast swelling, tenderness, discharge or bleeding. Pelvic exam was performed with patient supine. There is no rash, tenderness, lesion or injury on the right labia. There is no rash, tenderness, lesion or injury on the left labia. Uterus is not enlarged, not fixed and not tender. Cervix exhibits no motion tenderness, no discharge and no friability. Right adnexum displays no mass, no tenderness and no fullness. Left adnexum displays no mass, no tenderness and no fullness. No erythema, tenderness or bleeding in the vagina. No foreign body in the vagina. No signs of injury around the vagina. Vaginal discharge found.   Genitourinary Comments:   BSU normal  Urethral meatus  Normal  Perineum  Normal   Musculoskeletal: Normal range of motion. She exhibits no edema or tenderness.   Lymphadenopathy:        Head (right side): No submental, no submandibular, no tonsillar, no preauricular, no posterior auricular and no occipital adenopathy present.        Head (left side): No submental, no submandibular, no tonsillar, no preauricular, no posterior auricular and no occipital adenopathy present.     She has no cervical adenopathy.        Right cervical: No superficial cervical, no deep cervical and no posterior cervical adenopathy present.       Left cervical: No superficial cervical, no deep cervical and no posterior cervical adenopathy  present.     She has no axillary adenopathy.        Right: No inguinal adenopathy present.        Left: No inguinal adenopathy present.   Neurological: She is alert and oriented to person, place, and time. Coordination normal.   Skin: Skin is warm and dry. No bruising and no rash noted. She is not diaphoretic. No erythema.   Psychiatric: She has a normal mood and affect. Her behavior is normal. Judgment and thought content normal.   Nursing note and vitals reviewed.      Assessment/Plan   Miranda was seen today for gynecologic exam.    Diagnoses and all orders for this visit:    Well woman exam with routine gynecological exam  Normal GYN exam.  Patient had lab work lab work. Encouraged SBE, pt is aware how to do self breast exam and the importance of same. Discussed weight management and importance of maintaining a healthy weight. Discussed Vitamin D intake and the importance of adequate vitamin D for both Bone Health and a healthy immune system.  Discussed Daily exercise and the importance of same, in regards to a healthy heart as well as helping to maintain her weight and improving her mental health.  BMI 38.6.  Colonoscopy is up to date.  Mammogram and bone density will be scheduled at [facility].  Pap smear is done per ASCCP guidelines.  -     Liquid-based Pap Smear, Screening    Encounter for screening mammogram for breast cancer  Comments:  Patient will have mammogram at [facility].  Orders:  -     Mammo Screening Digital Tomosynthesis Bilateral With CAD; Future    Bloating  Comments:  Complains of bloating.  She will return for pelvic ultrasound.    Vaginal discharge  Comments:  A moderate amount of yellow discharge.  BV panel is added to Pap.  No meds are given today.  Orders:  -     Liquid-based Pap Smear, Screening    Encounter for screening for osteoporosis  Comments:  She will have a bone density at [facility].  Orders:  -     DEXA Bone Density Axial; Future    Reactive  depression  Comments:  Patient is having depression and would like to try something for it.  She will try Wellbutrin and will return in 6 weeks for follow-up.  Orders:  -     buPROPion XL (WELLBUTRIN XL) 150 MG 24 hr tablet; Take 1 tablet by mouth Every Morning.    Rash  Comments:  Patient request Mycolog cream that she uses for rash she gets under her breast and in her groin.  Orders:  -     nystatin-triamcinolone (MYCOLOG II) 318122-0.1 UNIT/GM-% cream; Apply  topically to the appropriate area as directed 2 (Two) Times a Day.    Smoker  Comments:  She would like to try to stop smoking.  She is given Chantix today. The patient is counseled regarding smoking cessation. She is given information on the consequences of smoking and her health. We discussed the risks of smoking affecting those around her. We discussed CXR and the new CT of the chest for evaluation of her lungs. She is instructed about the smoking cessation class offered by [facility]. We discussed this face to face for 3-5 minutes and she is given a handout regarding the class at [facility].  Orders:  -     varenicline (CHANTIX STARTING MONTH PAK) 0.5 MG X 11 & 1 MG X 42 tablet; Take 0.5 mg one daily on days 1-3 and and 0.5 mg twice daily on days 4-7.Then 1 mg twice daily for a total of 12 weeks.         Patient's Body mass index is 38.58 kg/m². BMI is above normal parameters. Recommendations include: educational material, exercise counseling and nutrition counseling.      Luzmaria Calhoun, APRN  1/3/2020

## 2020-01-07 LAB
HPV I/H RISK 4 DNA CVX QL PROBE+SIG AMP: NOT DETECTED
TRICHOMONAS VAGINALIS PCR: NOT DETECTED

## 2020-01-09 LAB
GEN CATEG CVX/VAG CYTO-IMP: NORMAL
LAB AP CASE REPORT: NORMAL
LAB AP GYN ADDITIONAL INFORMATION: NORMAL
LAB AP GYN OTHER FINDINGS: NORMAL
PATH INTERP SPEC-IMP: NORMAL
STAT OF ADQ CVX/VAG CYTO-IMP: NORMAL

## 2020-01-09 RX ORDER — DOXYCYCLINE 100 MG/1
100 CAPSULE ORAL EVERY 12 HOURS SCHEDULED
Qty: 20 CAPSULE | Refills: 0 | Status: SHIPPED | OUTPATIENT
Start: 2020-01-09 | End: 2020-01-19

## 2020-01-15 NOTE — PATIENT INSTRUCTIONS

## 2020-01-20 ENCOUNTER — HOSPITAL ENCOUNTER (OUTPATIENT)
Dept: MAMMOGRAPHY | Facility: HOSPITAL | Age: 55
Discharge: HOME | End: 2020-01-20
Admitting: NURSE PRACTITIONER

## 2020-01-20 ENCOUNTER — HOSPITAL ENCOUNTER (OUTPATIENT)
Dept: BONE DENSITY | Facility: HOSPITAL | Age: 55
Discharge: HOME | End: 2020-01-20

## 2020-01-20 DIAGNOSIS — Z13.820 ENCOUNTER FOR SCREENING FOR OSTEOPOROSIS: ICD-10-CM

## 2020-01-20 DIAGNOSIS — Z12.31 ENCOUNTER FOR SCREENING MAMMOGRAM FOR BREAST CANCER: ICD-10-CM

## 2020-01-20 PROCEDURE — 77080 DXA BONE DENSITY AXIAL: CPT

## 2020-01-20 PROCEDURE — 77063 BREAST TOMOSYNTHESIS BI: CPT

## 2020-01-20 PROCEDURE — 77067 SCR MAMMO BI INCL CAD: CPT

## 2020-02-13 ENCOUNTER — OFFICE VISIT (OUTPATIENT)
Dept: OBSTETRICS AND GYNECOLOGY | Facility: CLINIC | Age: 55
End: 2020-02-13

## 2020-02-13 VITALS
BODY MASS INDEX: 36.64 KG/M2 | DIASTOLIC BLOOD PRESSURE: 72 MMHG | SYSTOLIC BLOOD PRESSURE: 126 MMHG | HEIGHT: 66 IN | WEIGHT: 228 LBS

## 2020-02-13 DIAGNOSIS — R10.13 EPIGASTRIC PAIN: ICD-10-CM

## 2020-02-13 DIAGNOSIS — R10.2 PELVIC PAIN: Primary | ICD-10-CM

## 2020-02-13 DIAGNOSIS — F17.200 SMOKER: ICD-10-CM

## 2020-02-13 PROCEDURE — 99213 OFFICE O/P EST LOW 20 MIN: CPT | Performed by: NURSE PRACTITIONER

## 2020-02-13 RX ORDER — VARENICLINE TARTRATE 1 MG/1
1 TABLET, FILM COATED ORAL 2 TIMES DAILY
Qty: 60 TABLET | Refills: 3 | Status: SHIPPED | OUTPATIENT
Start: 2020-02-13 | End: 2021-04-21

## 2020-02-13 NOTE — PROGRESS NOTES
"Subjective     Miranda Elliott is a 54 y.o. female    Patient is here today for follow-up of pelvic pain.  She had a pelvic ultrasound done today that was normal.  She also has complaints of epigastric pain.  She had started on Wellbutrin but did not like the way it made her feel so she stopped it.  She also started on Chantix and she has decreased her cigarette intake to 2 daily.    Pelvic Pain   The patient's primary symptoms include pelvic pain. The patient's pertinent negatives include no vaginal bleeding or vaginal discharge. The patient is experiencing no pain. Pertinent negatives include no abdominal pain, anorexia, back pain, chills, constipation, diarrhea, discolored urine, dysuria, fever, flank pain, frequency, headaches, hematuria, joint pain, joint swelling, nausea, painful intercourse, rash, sore throat, urgency or vomiting. She is sexually active. She is postmenopausal.         /72   Ht 167.6 cm (66\")   Wt 103 kg (228 lb)   Breastfeeding No   BMI 36.80 kg/m²     Outpatient Encounter Medications as of 2/13/2020   Medication Sig Dispense Refill   • [DISCONTINUED] varenicline (CHANTIX STARTING MONTH CLARISA) 0.5 MG X 11 & 1 MG X 42 tablet Take 0.5 mg one daily on days 1-3 and and 0.5 mg twice daily on days 4-7.Then 1 mg twice daily for a total of 12 weeks. 53 tablet 1   • buPROPion XL (WELLBUTRIN XL) 150 MG 24 hr tablet Take 1 tablet by mouth Every Morning. 30 tablet 12   • Insulin Pen Needle 32G X 6 MM misc 1 Device Daily. 30 each 3   • Liraglutide -Weight Management (SAXENDA) 18 MG/3ML solution pen-injector Inject 0.6 mg under the skin Daily. Use as directed, increasing the dose until the maintance dose of 3 mg 5 pen 3   • nystatin-triamcinolone (MYCOLOG II) 041561-0.1 UNIT/GM-% cream Apply  topically to the appropriate area as directed 2 (Two) Times a Day. 60 g 3   • varenicline (CHANTIX CONTINUING MONTH CLARISA) 1 MG tablet Take 1 tablet by mouth 2 (Two) Times a Day. 60 tablet 3     No " facility-administered encounter medications on file as of 2/13/2020.        Surgical History  Past Surgical History:   Procedure Laterality Date   • ADENOIDECTOMY     • BACK SURGERY     • COLONOSCOPY  2012   • ENDOMETRIAL ABLATION     • SALPINGO OOPHORECTOMY     • TONSILLECTOMY     • TUBAL ABDOMINAL LIGATION         Family History  Family History   Problem Relation Age of Onset   • Diabetes Father    • No Known Problems Mother    • No Known Problems Sister    • Prostate cancer Maternal Grandfather    • Breast cancer Neg Hx    • Ovarian cancer Neg Hx    • Uterine cancer Neg Hx    • Colon cancer Neg Hx    • Melanoma Neg Hx        The following portions of the patient's history were reviewed and updated as appropriate: allergies, current medications, past family history, past medical history, past social history, past surgical history and problem list.    Review of Systems   Constitutional: Negative for activity change, appetite change, chills, diaphoresis, fatigue, fever, unexpected weight gain and unexpected weight loss.   HENT: Negative for congestion, dental problem, drooling, ear discharge, ear pain, facial swelling, hearing loss, mouth sores, nosebleeds, postnasal drip, rhinorrhea, sinus pressure, sneezing, sore throat, swollen glands, tinnitus, trouble swallowing and voice change.    Eyes: Negative for blurred vision, double vision, photophobia, pain, discharge, redness, itching and visual disturbance.   Respiratory: Negative for apnea, cough, choking, chest tightness, shortness of breath, wheezing and stridor.    Cardiovascular: Negative for chest pain, palpitations and leg swelling.   Gastrointestinal: Positive for indigestion. Negative for abdominal distention, abdominal pain, anal bleeding, anorexia, blood in stool, constipation, diarrhea, nausea, rectal pain, vomiting and GERD.   Endocrine: Negative for cold intolerance, heat intolerance, polydipsia, polyphagia and polyuria.   Genitourinary: Positive for  pelvic pain. Negative for amenorrhea, breast discharge, breast lump, breast pain, decreased libido, decreased urine volume, difficulty urinating, dyspareunia, dysuria, flank pain, frequency, genital sores, hematuria, menstrual problem, pelvic pressure, urgency, urinary incontinence, vaginal bleeding, vaginal discharge and vaginal pain.   Musculoskeletal: Negative for arthralgias, back pain, gait problem, joint pain, joint swelling, myalgias, neck pain, neck stiffness and bursitis.   Skin: Negative for color change, dry skin and rash.   Allergic/Immunologic: Negative for environmental allergies, food allergies and immunocompromised state.   Neurological: Negative for dizziness, tremors, seizures, syncope, facial asymmetry, speech difficulty, weakness, light-headedness, numbness, headache, memory problem and confusion.   Hematological: Negative for adenopathy. Does not bruise/bleed easily.   Psychiatric/Behavioral: Negative for agitation, behavioral problems, decreased concentration, dysphoric mood, hallucinations, self-injury, sleep disturbance, suicidal ideas, negative for hyperactivity, depressed mood and stress. The patient is not nervous/anxious.        Objective   Physical Exam   Constitutional: She is oriented to person, place, and time. She appears well-developed and well-nourished.   HENT:   Head: Normocephalic and atraumatic.   Eyes: Conjunctivae are normal. Right eye exhibits no discharge. Left eye exhibits no discharge.   Neck: Normal range of motion. Neck supple. No thyromegaly present.   Cardiovascular: Normal rate, regular rhythm and normal heart sounds.   Pulmonary/Chest: Effort normal and breath sounds normal.   Neurological: She is alert and oriented to person, place, and time.   Skin: Skin is warm and dry.   Psychiatric: She has a normal mood and affect. Her behavior is normal. Judgment and thought content normal.   Nursing note and vitals reviewed.      Assessment/Plan   Miranda was seen today for  pelvic pain.    Diagnoses and all orders for this visit:    Pelvic pain  Comments:  Patient had a pelvic ultrasound today for follow-up of pelvic pain it was normal.  She states that her pelvic pain has now resolved.    Smoker  Comments:  Patient has been a smoker approximately 20 years and would like to have a low-dose CT scan.  She has started on Chantix and has decreased her smoking  Orders:  -     varenicline (CHANTIX CONTINUING MONTH CLARISA) 1 MG tablet; Take 1 tablet by mouth 2 (Two) Times a Day.  -     CT chest low dose wo; Future    Epigastric pain  Comments:  Patient is having some epigastric pain and some indigestion.  If her CT is normal she wishes to proceed on with an abdominal ultrasound.         Patient's Body mass index is 36.8 kg/m². BMI is above normal parameters. Recommendations include: educational material, exercise counseling and nutrition counseling.      Luzmaria Calhoun, APRN  2/13/2020

## 2020-02-13 NOTE — PATIENT INSTRUCTIONS

## 2020-12-31 ENCOUNTER — APPOINTMENT (OUTPATIENT)
Dept: VACCINE CLINIC | Facility: HOSPITAL | Age: 55
End: 2020-12-31

## 2021-04-21 ENCOUNTER — OFFICE VISIT (OUTPATIENT)
Dept: FAMILY MEDICINE CLINIC | Facility: CLINIC | Age: 56
End: 2021-04-21

## 2021-04-21 VITALS
WEIGHT: 238.8 LBS | DIASTOLIC BLOOD PRESSURE: 73 MMHG | SYSTOLIC BLOOD PRESSURE: 131 MMHG | TEMPERATURE: 97.3 F | BODY MASS INDEX: 38.38 KG/M2 | HEIGHT: 66 IN | RESPIRATION RATE: 20 BRPM | HEART RATE: 76 BPM

## 2021-04-21 DIAGNOSIS — M77.8 TENDINITIS OF LEFT ELBOW: ICD-10-CM

## 2021-04-21 DIAGNOSIS — H81.10 BENIGN PAROXYSMAL POSITIONAL VERTIGO, UNSPECIFIED LATERALITY: ICD-10-CM

## 2021-04-21 DIAGNOSIS — J01.90 ACUTE NON-RECURRENT SINUSITIS, UNSPECIFIED LOCATION: Primary | ICD-10-CM

## 2021-04-21 DIAGNOSIS — M54.12 CERVICAL RADICULOPATHY: ICD-10-CM

## 2021-04-21 DIAGNOSIS — H65.02 NON-RECURRENT ACUTE SEROUS OTITIS MEDIA OF LEFT EAR: ICD-10-CM

## 2021-04-21 PROBLEM — R42 DIZZINESS: Status: ACTIVE | Noted: 2021-04-21

## 2021-04-21 PROBLEM — H92.02 LEFT EAR PAIN: Status: ACTIVE | Noted: 2021-04-21

## 2021-04-21 PROBLEM — M79.602 LEFT ARM PAIN: Status: ACTIVE | Noted: 2021-04-21

## 2021-04-21 PROCEDURE — 96372 THER/PROPH/DIAG INJ SC/IM: CPT | Performed by: PEDIATRICS

## 2021-04-21 PROCEDURE — 99204 OFFICE O/P NEW MOD 45 MIN: CPT | Performed by: PEDIATRICS

## 2021-04-21 RX ORDER — CEFUROXIME AXETIL 500 MG/1
500 TABLET ORAL 2 TIMES DAILY
Qty: 20 TABLET | Refills: 0 | Status: SHIPPED | OUTPATIENT
Start: 2021-04-21 | End: 2021-06-14

## 2021-04-21 RX ORDER — MECLIZINE HYDROCHLORIDE 25 MG/1
25 TABLET ORAL 3 TIMES DAILY PRN
Qty: 30 TABLET | Refills: 0 | Status: SHIPPED | OUTPATIENT
Start: 2021-04-21 | End: 2021-06-14

## 2021-04-21 RX ORDER — DEXAMETHASONE SODIUM PHOSPHATE 4 MG/ML
8 INJECTION, SOLUTION INTRA-ARTICULAR; INTRALESIONAL; INTRAMUSCULAR; INTRAVENOUS; SOFT TISSUE ONCE
Status: COMPLETED | OUTPATIENT
Start: 2021-04-21 | End: 2021-04-21

## 2021-04-21 RX ADMIN — DEXAMETHASONE SODIUM PHOSPHATE 8 MG: 4 INJECTION, SOLUTION INTRA-ARTICULAR; INTRALESIONAL; INTRAMUSCULAR; INTRAVENOUS; SOFT TISSUE at 10:01

## 2021-04-21 NOTE — PROGRESS NOTES
"Chief Complaint  Dizziness, Earache (L), and Arm Pain (L)    Subjective    History of Present Illness      Patient presents to [facility] for   Pt is being seen today c/o dizziness, L ear pain, and sinus drainage. Ear pain started last night and pt took sudafed. Dizziness began this morning. States dizziness is fine as long as sitting but when gets up and starts walking gets severely dizzy. Pt also states last year she was scratched by cat and this took a while to heal. Pt states still now area seems swollen and area has pain.     Dizziness  This is a new problem. The current episode started today. Pertinent negatives include no chills or fever.   Earache   There is pain in the left ear. This is a new problem. The current episode started yesterday.        Review of Systems   Constitutional: Negative for chills and fever.   HENT: Positive for ear pain.    Eyes: Negative for blurred vision, double vision and visual disturbance.   Respiratory: Negative.    Cardiovascular: Negative.    Gastrointestinal: Negative.    Endocrine: Negative.    Allergic/Immunologic: Negative.    Neurological: Positive for dizziness and light-headedness. Negative for headache.   Psychiatric/Behavioral: Negative.        I have reviewed and agree with the HPI and ROS information as above.  Trent Caldwell MD     Objective   Vital Signs:   /73   Pulse 76   Temp 97.3 °F (36.3 °C)   Resp 20   Ht 167.6 cm (66\")   Wt 108 kg (238 lb 12.8 oz)   BMI 38.54 kg/m²       Physical Exam  Constitutional:       Appearance: Normal appearance. She is well-developed. She is obese.   HENT:      Head: Normocephalic and atraumatic.      Right Ear: Tympanic membrane, ear canal and external ear normal.      Left Ear: Tympanic membrane, ear canal and external ear normal.      Nose: Rhinorrhea present. No septal deviation, nasal tenderness or congestion.      Right Turbinates: Swollen.      Right Sinus: Maxillary sinus " tenderness present.      Left Sinus: Maxillary sinus tenderness present.      Mouth/Throat:      Lips: Pink. No lesions.      Mouth: Mucous membranes are moist. No oral lesions.      Dentition: Normal dentition.      Pharynx: Oropharynx is clear. No pharyngeal swelling, oropharyngeal exudate or posterior oropharyngeal erythema.   Eyes:      General: Lids are normal. Vision grossly intact. No scleral icterus.        Right eye: No discharge.         Left eye: No discharge.      Extraocular Movements: Extraocular movements intact.      Conjunctiva/sclera: Conjunctivae normal.      Right eye: Right conjunctiva is not injected.      Left eye: Left conjunctiva is not injected.      Pupils: Pupils are equal, round, and reactive to light.   Neck:      Thyroid: No thyroid mass.      Trachea: Trachea normal.   Cardiovascular:      Rate and Rhythm: Normal rate and regular rhythm.      Heart sounds: Normal heart sounds. No murmur heard.   No gallop.    Pulmonary:      Effort: Pulmonary effort is normal.      Breath sounds: Normal breath sounds and air entry. No wheezing, rhonchi or rales.   Abdominal:      General: There is no distension.      Palpations: Abdomen is soft. There is no mass.      Tenderness: There is no abdominal tenderness. There is no right CVA tenderness, left CVA tenderness, guarding or rebound.   Musculoskeletal:         General: No deformity.      Left elbow: Swelling present. Decreased range of motion. Tenderness present.      Cervical back: Full passive range of motion without pain, normal range of motion and neck supple.      Thoracic back: Normal.      Right lower leg: No edema.      Left lower leg: No edema.   Skin:     General: Skin is warm and dry.      Coloration: Skin is not jaundiced.      Findings: No rash.   Neurological:      Mental Status: She is alert and oriented to person, place, and time.      Cranial Nerves: Cranial nerves are intact.      Sensory: Sensation is intact.      Motor: Motor  function is intact.      Coordination: Coordination is intact.      Gait: Gait is intact.      Deep Tendon Reflexes: Reflexes are normal and symmetric.   Psychiatric:         Mood and Affect: Mood and affect normal.         Judgment: Judgment normal.          Result Review  Data Reviewed:                   Assessment and Plan    Patient's Body mass index is 38.54 kg/m². BMI is above normal parameters. Recommendations include: nutrition counseling.    Problem List Items Addressed This Visit        ENT    Acute non-recurrent sinusitis - Primary    Relevant Medications    cefuroxime (CEFTIN) 500 MG tablet    Non-recurrent acute serous otitis media of left ear    Relevant Medications    cefuroxime (CEFTIN) 500 MG tablet    Benign paroxysmal positional vertigo    Relevant Medications    meclizine (ANTIVERT) 25 MG tablet       Musculoskeletal and Injuries    Tendinitis of left elbow    Current Assessment & Plan     Discussed injection option.  Patient declines at this time.            Neuro    Cervical radiculopathy              Follow Up   Return if symptoms worsen or fail to improve.  Patient was given instructions and counseling regarding her condition or for health maintenance advice. Please see specific information pulled into the AVS if appropriate.

## 2021-04-28 DIAGNOSIS — F17.200 SMOKER: Primary | ICD-10-CM

## 2021-06-14 ENCOUNTER — HOSPITAL ENCOUNTER (OUTPATIENT)
Dept: GENERAL RADIOLOGY | Facility: HOSPITAL | Age: 56
Discharge: HOME | End: 2021-06-14
Admitting: FAMILY MEDICINE

## 2021-06-14 PROCEDURE — 73562 X-RAY EXAM OF KNEE 3: CPT

## 2021-06-22 ENCOUNTER — HOSPITAL ENCOUNTER (EMERGENCY)
Facility: HOSPITAL | Age: 56
Discharge: HOME | End: 2021-06-22
Admitting: EMERGENCY MEDICINE

## 2021-06-22 ENCOUNTER — APPOINTMENT (OUTPATIENT)
Dept: GENERAL RADIOLOGY | Facility: HOSPITAL | Age: 56
End: 2021-06-22

## 2021-06-22 ENCOUNTER — TRANSCRIBE ORDERS (OUTPATIENT)
Dept: ADMINISTRATIVE | Facility: HOSPITAL | Age: 56
End: 2021-06-22

## 2021-06-22 VITALS
DIASTOLIC BLOOD PRESSURE: 95 MMHG | SYSTOLIC BLOOD PRESSURE: 158 MMHG | WEIGHT: 230 LBS | OXYGEN SATURATION: 98 % | TEMPERATURE: 98.6 F | HEART RATE: 80 BPM | HEIGHT: 66 IN | RESPIRATION RATE: 18 BRPM | BODY MASS INDEX: 36.96 KG/M2

## 2021-06-22 DIAGNOSIS — M23.92 INTERNAL DERANGEMENT OF LEFT KNEE: ICD-10-CM

## 2021-06-22 DIAGNOSIS — S83.207A TEAR OF LEFT MENISCUS AS CURRENT INJURY, INITIAL ENCOUNTER: Primary | ICD-10-CM

## 2021-06-22 DIAGNOSIS — S86.912A KNEE STRAIN, LEFT, INITIAL ENCOUNTER: Primary | ICD-10-CM

## 2021-06-22 PROCEDURE — 73562 X-RAY EXAM OF KNEE 3: CPT

## 2021-06-22 PROCEDURE — 99283 EMERGENCY DEPT VISIT LOW MDM: CPT

## 2021-06-22 NOTE — ED PROVIDER NOTES
"Subjective   Patient is a 55-year-old white female presents the emergency department with left knee pain.  She states she has had intermittent left knee pain for about the past week and had a negative x-ray about a week ago.  She states that she was started on steroids however she did not start taking them because her knee started feeling better.  She states today she was walking out of the bathroom here at work and felt a pop in her knee and had difficulty walking after that.  She states she felt like her knee was going to \"go out from under her.\"  She states that she did not fall.      History provided by:  Patient   used: No        Review of Systems   Constitutional: Negative.    HENT: Negative.    Eyes: Negative.    Respiratory: Negative.    Cardiovascular: Negative.    Gastrointestinal: Negative.    Endocrine: Negative.    Genitourinary: Negative.    Musculoskeletal:        Patient is a 55-year-old white female presents the emergency department with left knee pain.  She states she has had intermittent left knee pain for about the past week and had a negative x-ray about a week ago.  She states that she was started on steroids however she did not start taking them because her knee started feeling better.  She states today she was walking out of the bathroom here at work and felt a pop in her knee and had difficulty walking after that.  She states she felt like her knee was going to \"go out from under her.\"  She states that she did not fall.     Skin: Negative.    Allergic/Immunologic: Negative.    Neurological: Negative.    Hematological: Negative.    Psychiatric/Behavioral: Negative.    All other systems reviewed and are negative.      Past Medical History:   Diagnosis Date   • Abnormal Pap smear of cervix    • Anxiety    • Cervical dysplasia        No Known Allergies    Past Surgical History:   Procedure Laterality Date   • ADENOIDECTOMY     • BACK SURGERY     • COLONOSCOPY  2012   • " "ENDOMETRIAL ABLATION     • SALPINGO OOPHORECTOMY     • TONSILLECTOMY     • TUBAL ABDOMINAL LIGATION         Family History   Problem Relation Age of Onset   • Diabetes Father    • No Known Problems Mother    • No Known Problems Sister    • Prostate cancer Maternal Grandfather    • Breast cancer Neg Hx    • Ovarian cancer Neg Hx    • Uterine cancer Neg Hx    • Colon cancer Neg Hx    • Melanoma Neg Hx        Social History     Socioeconomic History   • Marital status:      Spouse name: Not on file   • Number of children: Not on file   • Years of education: Not on file   • Highest education level: Not on file   Tobacco Use   • Smoking status: Light Tobacco Smoker     Packs/day: 0.25     Types: Cigarettes     Start date: 1994   • Smokeless tobacco: Never Used   • Tobacco comment: 1 cigarette every now and then.   Vaping Use   • Vaping Use: Former   Substance and Sexual Activity   • Alcohol use: Not Currently     Comment: Occ   • Drug use: No   • Sexual activity: Yes     Birth control/protection: Surgical       Prior to Admission medications    Medication Sig Start Date End Date Taking? Authorizing Provider   methylPREDNISolone (MEDROL) 4 MG dose pack Take as directed on package instructions. 6/14/21   Shaun Haro MD   varenicline (CHANTIX) 1 MG tablet Take 1 mg by mouth 2 (Two) Times a Day.    Emergency, Nurse Epic, RN       /95   Pulse 80   Temp 98.6 °F (37 °C)   Resp 18   Ht 167.6 cm (66\")   Wt 104 kg (230 lb)   SpO2 98%   BMI 37.12 kg/m²     Objective   Physical Exam  Vitals and nursing note reviewed.   Constitutional:       Appearance: She is well-developed.   HENT:      Head: Normocephalic and atraumatic.   Eyes:      Conjunctiva/sclera: Conjunctivae normal.      Pupils: Pupils are equal, round, and reactive to light.   Neck:      Thyroid: No thyromegaly.      Trachea: No tracheal deviation.   Cardiovascular:      Rate and Rhythm: Normal rate and regular rhythm.      Heart sounds: " Normal heart sounds.   Pulmonary:      Effort: Pulmonary effort is normal. No respiratory distress.      Breath sounds: Normal breath sounds. No wheezing or rales.   Chest:      Chest wall: No tenderness.   Musculoskeletal:      Cervical back: Normal range of motion and neck supple.      Comments: Left lower extremity: There is soft tissue swelling noted to the left knee.  She has increased pain with extension.  DTRs are intact.  No joint instability noted.   Skin:     General: Skin is warm and dry.   Neurological:      Mental Status: She is alert and oriented to person, place, and time.      Cranial Nerves: No cranial nerve deficit.      Deep Tendon Reflexes: Reflexes are normal and symmetric.   Psychiatric:         Behavior: Behavior normal.         Thought Content: Thought content normal.         Judgment: Judgment normal.         Procedures         Lab Results (last 24 hours)     ** No results found for the last 24 hours. **          XR Knee 3 View Left   Final Result   No acute osseous abnormality. Mild degenerative changes as   described. Questionable small joint effusion.   This report was finalized on 06/22/2021 08:57 by Dr. Blayne Cary MD.          ED Course  ED Course as of Jun 22 1330   Tue Jun 22, 2021   0900 Reviewed xrays results with dr reddy- no acute fx noted. Reviewed results with pt. Advised pt would need to follow up with [facility] and will probably need a mri of the knee in the future. Will place in knee immobilizer and crutches today. Advised to elevate/ apply ice . Pt states that she is going to try to follow up with dr desai    [CW]      ED Course User Index  [CW] Evelia Ordoñez APRN          MDM  Number of Diagnoses or Management Options  Internal derangement of left knee: new and requires workup  Knee strain, left, initial encounter: new and requires workup     Amount and/or Complexity of Data Reviewed  Tests in the radiology section of CPT®: ordered and  reviewed    Patient Progress  Patient progress: stable      Final diagnoses:   Knee strain, left, initial encounter   Internal derangement of left knee          Evelia Ordoñez, APRN  06/22/21 2538

## 2021-06-23 ENCOUNTER — HOSPITAL ENCOUNTER (OUTPATIENT)
Dept: MRI IMAGING | Facility: HOSPITAL | Age: 56
Discharge: HOME | End: 2021-06-23
Admitting: PHYSICIAN ASSISTANT

## 2021-06-23 DIAGNOSIS — S83.207A TEAR OF LEFT MENISCUS AS CURRENT INJURY, INITIAL ENCOUNTER: ICD-10-CM

## 2021-06-23 PROCEDURE — 73721 MRI JNT OF LWR EXTRE W/O DYE: CPT

## 2021-06-24 ENCOUNTER — APPOINTMENT (OUTPATIENT)
Dept: MRI IMAGING | Facility: HOSPITAL | Age: 56
End: 2021-06-24

## 2021-06-27 DIAGNOSIS — F17.200 SMOKER: ICD-10-CM

## 2021-06-28 NOTE — TELEPHONE ENCOUNTER
Pt last seen 21 by NELLI Whitehead. Pt sent script for chantix starter on 21. Pt requested cont. Will route to provider to review and sign. Contacted pt, verified name and . Verified pref pharm.

## 2021-06-29 RX ORDER — VARENICLINE TARTRATE 1 MG/1
1 TABLET, FILM COATED ORAL SEE ADMIN INSTRUCTIONS
Qty: 42 TABLET | Refills: 0 | Status: SHIPPED | OUTPATIENT
Start: 2021-06-29 | End: 2021-09-20

## 2021-07-09 ENCOUNTER — APPOINTMENT (OUTPATIENT)
Dept: MRI IMAGING | Facility: HOSPITAL | Age: 56
End: 2021-07-09

## 2021-07-19 ENCOUNTER — TRANSCRIBE ORDERS (OUTPATIENT)
Dept: ADMINISTRATIVE | Facility: HOSPITAL | Age: 56
End: 2021-07-19

## 2021-07-19 DIAGNOSIS — Z11.59 SCREENING FOR VIRAL DISEASE: Primary | ICD-10-CM

## 2021-07-21 ENCOUNTER — PRE-ADMISSION TESTING (OUTPATIENT)
Dept: PREADMISSION TESTING | Facility: HOSPITAL | Age: 56
End: 2021-07-21

## 2021-07-21 ENCOUNTER — ANESTHESIA EVENT (OUTPATIENT)
Dept: PERIOP | Facility: HOSPITAL | Age: 56
End: 2021-07-21

## 2021-07-21 VITALS
DIASTOLIC BLOOD PRESSURE: 94 MMHG | BODY MASS INDEX: 37.75 KG/M2 | SYSTOLIC BLOOD PRESSURE: 148 MMHG | RESPIRATION RATE: 18 BRPM | WEIGHT: 240.52 LBS | OXYGEN SATURATION: 99 % | HEIGHT: 67 IN | HEART RATE: 85 BPM

## 2021-07-21 LAB
DEPRECATED RDW RBC AUTO: 41 FL (ref 37–54)
ERYTHROCYTE [DISTWIDTH] IN BLOOD BY AUTOMATED COUNT: 13 % (ref 12.3–15.4)
HCT VFR BLD AUTO: 44.8 % (ref 34–46.6)
HGB BLD-MCNC: 15 G/DL (ref 12–15.9)
MCH RBC QN AUTO: 29.1 PG (ref 26.6–33)
MCHC RBC AUTO-ENTMCNC: 33.5 G/DL (ref 31.5–35.7)
MCV RBC AUTO: 86.8 FL (ref 79–97)
PLATELET # BLD AUTO: 300 10*3/MM3 (ref 140–450)
PMV BLD AUTO: 8.6 FL (ref 6–12)
RBC # BLD AUTO: 5.16 10*6/MM3 (ref 3.77–5.28)
SARS-COV-2 RNA PNL SPEC NAA+PROBE: NOT DETECTED
WBC # BLD AUTO: 8.79 10*3/MM3 (ref 3.4–10.8)

## 2021-07-21 PROCEDURE — 85027 COMPLETE CBC AUTOMATED: CPT

## 2021-07-21 PROCEDURE — C9803 HOPD COVID-19 SPEC COLLECT: HCPCS | Performed by: ORTHOPAEDIC SURGERY

## 2021-07-21 PROCEDURE — 36415 COLL VENOUS BLD VENIPUNCTURE: CPT

## 2021-07-21 PROCEDURE — 87635 SARS-COV-2 COVID-19 AMP PRB: CPT | Performed by: ORTHOPAEDIC SURGERY

## 2021-07-21 RX ORDER — TRAMADOL HYDROCHLORIDE 50 MG/1
50 TABLET ORAL EVERY 8 HOURS PRN
COMMUNITY
Start: 2021-07-16 | End: 2021-09-20

## 2021-07-21 RX ORDER — ALPRAZOLAM 1 MG/1
1 TABLET ORAL 2 TIMES DAILY PRN
COMMUNITY
End: 2021-07-29 | Stop reason: SDUPTHER

## 2021-07-21 NOTE — DISCHARGE INSTRUCTIONS
DAY OF SURGERY INSTRUCTIONS        YOUR SURGEON: Dr. Tovar    PROCEDURE: Left knee arthroscopic partial medial menisectomy    DATE OF SURGERY: Thursday July 22, 2021    ARRIVAL TIME: AS DIRECTED BY OFFICE    YOU MAY TAKE THE FOLLOWING MEDICATION(S) THE MORNING OF SURGERY WITH A SIP OF WATER: Tramadol if needed for pain, Xanax if needed for anxiety      ALL OTHER HOME MEDICATION CHECK WITH YOUR PHYSICIAN                MANAGING PAIN AFTER SURGERY    We know you are probably wondering what your pain will be like after surgery.  Following surgery it is unrealistic to expect you will not have pain.   Pain is how our bodies let us know that something is wrong or cautions us to be careful.  That said, our goal is to make your pain tolerable.    Methods we may use to treat your pain include (oral or IV medications, PCAs, epidurals, nerve blocks, etc.)   While some procedures require IV pain medications for a short time after surgery, transitioning to pain medications by mouth allows for better management of pain.   Your nurse will encourage you to take oral pain medications whenever possible.  IV medications work almost immediately, but only last a short while.  Taking medications by mouth allows for a more constant level of medication in your blood stream for a longer period of time.      Once your pain is out of control it is harder to get back under control.  It is important you are aware when your next dose of pain medication is due.  If you are admitted, your nurse may write the time of your next dose on the white board in your room to help you remember.      We are interested in your pain and encourage you to inform us about aggravating factors during your visit.   Many times a simple repositioning every few hours can make a big difference.    If your physician says it is okay, do not let your pain prevent you from getting out of bed. Be sure to call your nurse for assistance prior to getting up so you do not fall.       Before surgery, please decide your tolerable pain goal.  These faces help describe the pain ratings we use on a 0-10 scale.   Be prepared to tell us your goal and whether or not you take pain or anxiety medications at home.          BEFORE YOU COME TO THE HOSPITAL  (Pre-op instructions)  • Do not eat, drink, smoke or chew gum after midnight the night before surgery.  This also includes no mints.  • Morning of surgery take only the medicines you have been instructed with a sip of water unless otherwise instructed  by your physician.  • Do not shave, wear makeup or dark nail polish.  • Remove all jewelry including rings.  • Leave anything you consider valuable at home.  • Leave your suitcase in the car until after your surgery.  • Bring the following with you if applicable:  o Picture ID and insurance, Medicare or Medicaid cards  o Co-pay/deductible required by insurance (cash, check, credit card)  o Copy of advance directive, living will or power-of- documents if not brought to PAT  o CPAP or BIPAP mask and tubing  o Relaxation aids ( book, magazine), etc.  o Hearing aids                        ON THE DAY OF SURGERY  · On the day of surgery check in at registration located at the main entrance of the hospital.   ? You will be registered and given a beeper with instructions where to wait in the main lobby.  ? When your beeper lights up and vibrates a member of the Outpatient Surgery staff will meet you at the double doors under the stair steps and escort you to your preoperative room.   · You may have cloth compression devices placed on your legs. These help to prevent blood clots and reduce swelling in your legs.  · An IV may be inserted into one of your veins.  · In the operating room, you may be given one or more of the following:  ? A medicine to help you relax (sedative).  ? A medicine to numb the area (local anesthetic).  ? A medicine to make you fall asleep (general anesthetic).  ? A medicine that  "is injected into an area of your body to numb everything below the injection site (regional anesthetic).  · Your surgical site will be marked or identified.  · You may be given an antibiotic through your IV to help prevent infection.  Contact a health care provider if you:  · Develop a fever of more than 100.4°F (38°C) or other feelings of illness during the 48 hours before your surgery.  · Have symptoms that get worse.  Have questions or concerns about your surgery    General Anesthesia/Surgery, Adult  General anesthesia is the use of medicines to make a person \"go to sleep\" (unconscious) for a medical procedure. General anesthesia must be used for certain procedures, and is often recommended for procedures that:  · Last a long time.  · Require you to be still or in an unusual position.  · Are major and can cause blood loss.  The medicines used for general anesthesia are called general anesthetics. As well as making you unconscious for a certain amount of time, these medicines:  · Prevent pain.  · Control your blood pressure.  · Relax your muscles.  Tell a health care provider about:  · Any allergies you have.  · All medicines you are taking, including vitamins, herbs, eye drops, creams, and over-the-counter medicines.  · Any problems you or family members have had with anesthetic medicines.  · Types of anesthetics you have had in the past.  · Any blood disorders you have.  · Any surgeries you have had.  · Any medical conditions you have.  · Any recent upper respiratory, chest, or ear infections.  · Any history of:  ? Heart or lung conditions, such as heart failure, sleep apnea, asthma, or chronic obstructive pulmonary disease (COPD).  ?  service.  ? Depression or anxiety.  · Any tobacco or drug use, including marijuana or alcohol use.  · Whether you are pregnant or may be pregnant.  What are the risks?  Generally, this is a safe procedure. However, problems may occur, including:  · Allergic " reaction.  · Lung and heart problems.  · Inhaling food or liquid from the stomach into the lungs (aspiration).  · Nerve injury.  · Air in the bloodstream, which can lead to stroke.  · Extreme agitation or confusion (delirium) when you wake up from the anesthetic.  · Waking up during your procedure and being unable to move. This is rare.  These problems are more likely to develop if you are having a major surgery or if you have an advanced or serious medical condition. You can prevent some of these complications by answering all of your health care provider's questions thoroughly and by following all instructions before your procedure.  General anesthesia can cause side effects, including:  · Nausea or vomiting.  · A sore throat from the breathing tube.  · Hoarseness.  · Wheezing or coughing.  · Shaking chills.  · Tiredness.  · Body aches.  · Anxiety.  · Sleepiness or drowsiness.  · Confusion or agitation.  RISKS AND COMPLICATIONS OF SURGERY  Your health care provider will discuss possible risks and complications with you before surgery. Common risks and complications include:    · Problems due to the use of anesthetics.  · Blood loss and replacement (does not apply to minor surgical procedures).  · Temporary increase in pain due to surgery.  · Uncorrected pain or problems that the surgery was meant to correct.  · Infection.  · New damage.    What happens before the procedure?    Medicines  Ask your health care provider about:  · Changing or stopping your regular medicines. This is especially important if you are taking diabetes medicines or blood thinners.  · Taking medicines such as aspirin and ibuprofen. These medicines can thin your blood. Do not take these medicines unless your health care provider tells you to take them.  · Taking over-the-counter medicines, vitamins, herbs, and supplements. Do not take these during the week before your procedure unless your health care provider approves them.  General  instructions  · Starting 3-6 weeks before the procedure, do not use any products that contain nicotine or tobacco, such as cigarettes and e-cigarettes. If you need help quitting, ask your health care provider.  · If you brush your teeth on the morning of the procedure, make sure to spit out all of the toothpaste.  · Tell your health care provider if you become ill or develop a cold, cough, or fever.  · If instructed by your health care provider, bring your sleep apnea device with you on the day of your surgery (if applicable).  · Ask your health care provider if you will be going home the same day, the following day, or after a longer hospital stay.  ? Plan to have someone take you home from the hospital or clinic.  ? Plan to have a responsible adult care for you for at least 24 hours after you leave the hospital or clinic. This is important.  What happens during the procedure?  · You will be given anesthetics through both of the following:  ? A mask placed over your nose and mouth.  ? An IV in one of your veins.  · You may receive a medicine to help you relax (sedative).  · After you are unconscious, a breathing tube may be inserted down your throat to help you breathe. This will be removed before you wake up.  · An anesthesia specialist will stay with you throughout your procedure. He or she will:  ? Keep you comfortable and safe by continuing to give you medicines and adjusting the amount of medicine that you get.  ? Monitor your blood pressure, pulse, and oxygen levels to make sure that the anesthetics do not cause any problems.  The procedure may vary among health care providers and hospitals.  What happens after the procedure?  · Your blood pressure, temperature, heart rate, breathing rate, and blood oxygen level will be monitored until the medicines you were given have worn off.  · You will wake up in a recovery area. You may wake up slowly.  · If you feel anxious or agitated, you may be given medicine to  help you calm down.  · If you will be going home the same day, your health care provider may check to make sure you can walk, drink, and urinate.  · Your health care provider will treat any pain or side effects you have before you go home.  · Do not drive for 24 hours if you were given a sedative.  Summary  · General anesthesia is used to keep you still and prevent pain during a procedure.  · It is important to tell your healthcare provider about your medical history and any surgeries you have had, and previous experience with anesthesia.  · Follow your healthcare provider’s instructions about when to stop eating, drinking, or taking certain medicines before your procedure.  · Plan to have someone take you home from the hospital or clinic.  This information is not intended to replace advice given to you by your health care provider. Make sure you discuss any questions you have with your health care provider.  Document Released: 03/26/2009 Document Revised: 08/03/2018 Document Reviewed: 08/03/2018  [employer] [facility] [place] Interactive Patient Education © 2019 [employer] [place] [employer]       Fall Prevention in Hospitals, Adult  As a hospital patient, your condition and the treatments you receive can increase your risk for falls. Some additional risk factors for falls in a hospital include:  · Being in an unfamiliar environment.  · Being on bed rest.  · Your surgery.  · Taking certain medicines.  · Your tubing requirements, such as intravenous (IV) therapy or catheters.  It is important that you learn how to decrease fall risks while at the hospital. Below are important tips that can help prevent falls.  SAFETY TIPS FOR PREVENTING FALLS  Talk about your risk of falling.  · Ask your health care provider why you are at risk for falling. Is it your medicine, illness, tubing placement, or something else?  · Make a plan with your health care provider to keep you safe from falls.  · Ask your health care provider or pharmacist about side effects of your  medicines. Some medicines can make you dizzy or affect your coordination.  Ask for help.  · Ask for help before getting out of bed. You may need to press your call button.  · Ask for assistance in getting safely to the toilet.  · Ask for a walker or cane to be put at your bedside. Ask that most of the side rails on your bed be placed up before your health care provider leaves the room.  · Ask family or friends to sit with you.  · Ask for things that are out of your reach, such as your glasses, hearing aids, telephone, bedside table, or call button.  Follow these tips to avoid falling:  · Stay lying or seated, rather than standing, while waiting for help.  · Wear rubber-soled slippers or shoes whenever you walk in the hospital.  · Avoid quick, sudden movements.  ¨ Change positions slowly.  ¨ Sit on the side of your bed before standing.  ¨ Stand up slowly and wait before you start to walk.  · Let your health care provider know if there is a spill on the floor.  · Pay careful attention to the medical equipment, electrical cords, and tubes around you.  · When you need help, use your call button by your bed or in the bathroom. Wait for one of your health care providers to help you.  · If you feel dizzy or unsure of your footing, return to bed and wait for assistance.  · Avoid being distracted by the TV, telephone, or another person in your room.  · Do not lean or support yourself on rolling objects, such as IV poles or bedside tables.     This information is not intended to replace advice given to you by your health care provider. Make sure you discuss any questions you have with your health care provider.     Document Released: 12/15/2001 Document Revised: 01/08/2016 Document Reviewed: 08/25/2013  [employer] Interactive Patient Education ©2016 [employer]       [facility]  CHG 4% Patient Instruction Sheet    Chlorhexidine Before Surgery  Chlorhexidine gluconate (CHG) is a germ-killing (antiseptic) solution  that is used to clean the skin. It gets rid of the bacteria that normally live on the skin. Cleaning your skin with CHG before surgery helps lower the risk for infection after surgery.    How to use CHG solution  · You will take 2 showers, one shower the night before surgery, the second shower the morning of surgery before coming to the hospital.  · Use CHG only as told by your health care provider, and follow the instructions on the label.  · Use CHG solution while taking a shower. Follow these steps when using CHG solution (unless your health care provider gives you different instructions):  1. Start the shower.  2. Use your normal soap and shampoo to wash your face and hair.  3. Turn off the shower or move out of the shower stream.  4. Pour the CHG onto a clean washcloth. Do not use any type of brush or rough-edged sponge.  5. Starting at your neck, lather your body down to your toes. Make sure you:  6. Pay special attention to the part of your body where you will be having surgery. Scrub this area for at least 1 minute.  7. Use the full amount of CHG as directed. Usually, this is one half bottle for each shower.  8. Do not use CHG on your head or face. If the solution gets into your ears or eyes, rinse them well with water.  9. Avoid your genital area.  10. Avoid any areas of skin that have broken skin, cuts, or scrapes.  11. Scrub your back and under your arms. Make sure to wash skin folds.  12. Let the lather sit on your skin for 1-2 minutes or as long as told by your health care  provider.  13. Thoroughly rinse your entire body in the shower. Make sure that all body creases and crevices are rinsed well.  14. Dry off with a clean towel. Do not put any substances on your body afterward, such as powder, lotion, or perfume.  15. Put on clean clothes or pajamas.  16. If it is the night before your surgery, sleep in clean sheets.    What are the risks?  Risks of using CHG include:  · A skin reaction.  · Hearing  loss, if CHG gets in your ears.  · Eye injury, if CHG gets in your eyes and is not rinsed out.  · The CHG product catching fire.  Make sure that you avoid smoking and flames after applying CHG to your skin.  Do not use CHG:  · If you have a chlorhexidine allergy or have previously reacted to chlorhexidine.  · On babies younger than 2 months of age.      On the day of surgery, when you are taken to your room in Outpatient Surgery you will be given a CHG prepackaged cloth to wipe the site for your surgery.  How to use CHG prepackaged cloths  · Follow the instructions on the label.  · Use the CHG cloth on clean, dry skin. Follow these steps when using a CHG cloth (unless your health care provider gives you different instructions):  1. Using the CHG cloth, vigorously scrub the part of your body where you will be having surgery. Scrub using a back-and-forth motion for 3 minutes. The area on your body should be completely wet with CHG when you are finished scrubbing.  2. Do not rinse. Discard the cloth and let the area air-dry for 1 minute. Do not put any substances on your body afterward, such as powder, lotion, or perfume.  Contact a health care provider if:  · Your skin gets irritated after scrubbing.  · You have questions about using your solution or cloth.  Get help right away if:  · Your eyes become very red or swollen.  · Your eyes itch badly.  · Your skin itches badly and is red or swollen.  · Your hearing changes.  · You have trouble seeing.  · You have swelling or tingling in your mouth or throat.  · You have trouble breathing.  · You swallow any chlorhexidine.  Summary  · Chlorhexidine gluconate (CHG) is a germ-killing (antiseptic) solution that is used to clean the skin. Cleaning your skin with CHG before surgery helps lower the risk for infection after surgery.  · You may be given CHG to use at home. It may be in a bottle or in a prepackaged cloth to use on your skin. Carefully follow your health care  provider's instructions and the instructions on the product label.  · Do not use CHG if you have a chlorhexidine allergy.  · Contact your health care provider if your skin gets irritated after scrubbing.  This information is not intended to replace advice given to you by your health care provider. Make sure you discuss any questions you have with your health care provider.  Document Released: 09/11/2013 Document Revised: 11/15/2018 Document Reviewed: 11/15/2018  [employer] Interactive Patient Education © 2019 [employer]          PATIENT/FAMILY/RESPONSIBLE PARTY VERBALIZES UNDERSTANDING OF ABOVE EDUCATION.  COPY OF PAIN SCALE GIVEN AND REVIEWED WITH VERBALIZED UNDERSTANDING.

## 2021-07-22 ENCOUNTER — ANESTHESIA (OUTPATIENT)
Dept: PERIOP | Facility: HOSPITAL | Age: 56
End: 2021-07-22

## 2021-07-22 ENCOUNTER — HOSPITAL ENCOUNTER (OUTPATIENT)
Facility: HOSPITAL | Age: 56
Setting detail: HOSPITAL OUTPATIENT SURGERY
Discharge: HOME | End: 2021-07-22
Attending: ORTHOPAEDIC SURGERY | Admitting: ORTHOPAEDIC SURGERY

## 2021-07-22 VITALS
DIASTOLIC BLOOD PRESSURE: 84 MMHG | OXYGEN SATURATION: 98 % | RESPIRATION RATE: 22 BRPM | TEMPERATURE: 98.4 F | SYSTOLIC BLOOD PRESSURE: 153 MMHG | HEART RATE: 77 BPM

## 2021-07-22 DIAGNOSIS — Z98.890 S/P LEFT KNEE ARTHROSCOPY: Primary | ICD-10-CM

## 2021-07-22 PROCEDURE — 25010000002 PROPOFOL 10 MG/ML EMULSION: Performed by: NURSE ANESTHETIST, CERTIFIED REGISTERED

## 2021-07-22 PROCEDURE — 25010000002 FENTANYL CITRATE (PF) 50 MCG/ML SOLUTION: Performed by: ANESTHESIOLOGY

## 2021-07-22 PROCEDURE — 25010000002 ONDANSETRON PER 1 MG: Performed by: NURSE ANESTHETIST, CERTIFIED REGISTERED

## 2021-07-22 PROCEDURE — 25010000002 DEXAMETHASONE PER 1 MG: Performed by: NURSE ANESTHETIST, CERTIFIED REGISTERED

## 2021-07-22 PROCEDURE — 25010000002 MIDAZOLAM PER 1 MG: Performed by: ANESTHESIOLOGY

## 2021-07-22 PROCEDURE — 25010000002 ONDANSETRON PER 1 MG: Performed by: ANESTHESIOLOGY

## 2021-07-22 PROCEDURE — C1713 ANCHOR/SCREW BN/BN,TIS/BN: HCPCS | Performed by: ORTHOPAEDIC SURGERY

## 2021-07-22 PROCEDURE — 25010000002 FENTANYL CITRATE (PF) 100 MCG/2ML SOLUTION: Performed by: NURSE ANESTHETIST, CERTIFIED REGISTERED

## 2021-07-22 PROCEDURE — L1830 KO IMMOB CANVAS LONG PRE OTS: HCPCS | Performed by: ORTHOPAEDIC SURGERY

## 2021-07-22 PROCEDURE — 25010000002 ROPIVACAINE PER 1 MG: Performed by: ORTHOPAEDIC SURGERY

## 2021-07-22 PROCEDURE — 25010000002 CEFAZOLIN PER 500 MG: Performed by: ORTHOPAEDIC SURGERY

## 2021-07-22 PROCEDURE — C1889 IMPLANT/INSERT DEVICE, NOC: HCPCS | Performed by: ORTHOPAEDIC SURGERY

## 2021-07-22 DEVICE — IMPLANTABLE DEVICE: Type: IMPLANTABLE DEVICE | Site: KNEE | Status: FUNCTIONAL

## 2021-07-22 DEVICE — KT REPR MENISCAL ROOT W/SUT BUTN: Type: IMPLANTABLE DEVICE | Site: KNEE | Status: FUNCTIONAL

## 2021-07-22 DEVICE — FOOTPRINT ULTRA PK SUTURE ANCHOR,                                    4.5 MM, SL
Type: IMPLANTABLE DEVICE | Site: KNEE | Status: FUNCTIONAL
Brand: FOOTPRINT

## 2021-07-22 RX ORDER — MIDAZOLAM HYDROCHLORIDE 1 MG/ML
1 INJECTION INTRAMUSCULAR; INTRAVENOUS
Status: DISCONTINUED | OUTPATIENT
Start: 2021-07-22 | End: 2021-07-22 | Stop reason: HOSPADM

## 2021-07-22 RX ORDER — ROCURONIUM BROMIDE 10 MG/ML
INJECTION, SOLUTION INTRAVENOUS AS NEEDED
Status: DISCONTINUED | OUTPATIENT
Start: 2021-07-22 | End: 2021-07-22 | Stop reason: SURG

## 2021-07-22 RX ORDER — ACETAMINOPHEN 500 MG
1000 TABLET ORAL ONCE
Status: COMPLETED | OUTPATIENT
Start: 2021-07-22 | End: 2021-07-22

## 2021-07-22 RX ORDER — ONDANSETRON 2 MG/ML
4 INJECTION INTRAMUSCULAR; INTRAVENOUS ONCE AS NEEDED
Status: COMPLETED | OUTPATIENT
Start: 2021-07-22 | End: 2021-07-22

## 2021-07-22 RX ORDER — IBUPROFEN 600 MG/1
600 TABLET ORAL ONCE AS NEEDED
Status: DISCONTINUED | OUTPATIENT
Start: 2021-07-22 | End: 2021-07-22 | Stop reason: HOSPADM

## 2021-07-22 RX ORDER — ONDANSETRON 2 MG/ML
4 INJECTION INTRAMUSCULAR; INTRAVENOUS ONCE AS NEEDED
Status: DISCONTINUED | OUTPATIENT
Start: 2021-07-22 | End: 2021-07-22 | Stop reason: HOSPADM

## 2021-07-22 RX ORDER — DEXAMETHASONE SODIUM PHOSPHATE 4 MG/ML
INJECTION, SOLUTION INTRA-ARTICULAR; INTRALESIONAL; INTRAMUSCULAR; INTRAVENOUS; SOFT TISSUE AS NEEDED
Status: DISCONTINUED | OUTPATIENT
Start: 2021-07-22 | End: 2021-07-22 | Stop reason: SURG

## 2021-07-22 RX ORDER — ONDANSETRON 2 MG/ML
INJECTION INTRAMUSCULAR; INTRAVENOUS AS NEEDED
Status: DISCONTINUED | OUTPATIENT
Start: 2021-07-22 | End: 2021-07-22 | Stop reason: SURG

## 2021-07-22 RX ORDER — MAGNESIUM HYDROXIDE 1200 MG/15ML
LIQUID ORAL AS NEEDED
Status: DISCONTINUED | OUTPATIENT
Start: 2021-07-22 | End: 2021-07-22 | Stop reason: HOSPADM

## 2021-07-22 RX ORDER — SODIUM CHLORIDE, SODIUM LACTATE, POTASSIUM CHLORIDE, CALCIUM CHLORIDE 600; 310; 30; 20 MG/100ML; MG/100ML; MG/100ML; MG/100ML
100 INJECTION, SOLUTION INTRAVENOUS CONTINUOUS
Status: DISCONTINUED | OUTPATIENT
Start: 2021-07-22 | End: 2021-07-22 | Stop reason: HOSPADM

## 2021-07-22 RX ORDER — OXYCODONE HYDROCHLORIDE AND ACETAMINOPHEN 5; 325 MG/1; MG/1
1 TABLET ORAL EVERY 4 HOURS PRN
Qty: 28 TABLET | Refills: 0 | Status: SHIPPED | OUTPATIENT
Start: 2021-07-22 | End: 2021-09-20

## 2021-07-22 RX ORDER — NALOXONE HCL 0.4 MG/ML
0.4 VIAL (ML) INJECTION AS NEEDED
Status: DISCONTINUED | OUTPATIENT
Start: 2021-07-22 | End: 2021-07-22 | Stop reason: HOSPADM

## 2021-07-22 RX ORDER — FLUMAZENIL 0.1 MG/ML
0.2 INJECTION INTRAVENOUS AS NEEDED
Status: DISCONTINUED | OUTPATIENT
Start: 2021-07-22 | End: 2021-07-22 | Stop reason: HOSPADM

## 2021-07-22 RX ORDER — PROPOFOL 10 MG/ML
VIAL (ML) INTRAVENOUS AS NEEDED
Status: DISCONTINUED | OUTPATIENT
Start: 2021-07-22 | End: 2021-07-22 | Stop reason: SURG

## 2021-07-22 RX ORDER — NEOSTIGMINE METHYLSULFATE 5 MG/5 ML
SYRINGE (ML) INTRAVENOUS AS NEEDED
Status: DISCONTINUED | OUTPATIENT
Start: 2021-07-22 | End: 2021-07-22 | Stop reason: SURG

## 2021-07-22 RX ORDER — SODIUM CHLORIDE 0.9 % (FLUSH) 0.9 %
3-10 SYRINGE (ML) INJECTION AS NEEDED
Status: DISCONTINUED | OUTPATIENT
Start: 2021-07-22 | End: 2021-07-22 | Stop reason: HOSPADM

## 2021-07-22 RX ORDER — ONDANSETRON 4 MG/1
4 TABLET, FILM COATED ORAL ONCE AS NEEDED
Status: DISCONTINUED | OUTPATIENT
Start: 2021-07-22 | End: 2021-07-22 | Stop reason: HOSPADM

## 2021-07-22 RX ORDER — OXYCODONE AND ACETAMINOPHEN 7.5; 325 MG/1; MG/1
2 TABLET ORAL EVERY 4 HOURS PRN
Status: DISCONTINUED | OUTPATIENT
Start: 2021-07-22 | End: 2021-07-22 | Stop reason: HOSPADM

## 2021-07-22 RX ORDER — FENTANYL CITRATE 50 UG/ML
25 INJECTION, SOLUTION INTRAMUSCULAR; INTRAVENOUS
Status: DISCONTINUED | OUTPATIENT
Start: 2021-07-22 | End: 2021-07-22 | Stop reason: HOSPADM

## 2021-07-22 RX ORDER — FENTANYL CITRATE 50 UG/ML
INJECTION, SOLUTION INTRAMUSCULAR; INTRAVENOUS AS NEEDED
Status: DISCONTINUED | OUTPATIENT
Start: 2021-07-22 | End: 2021-07-22 | Stop reason: SURG

## 2021-07-22 RX ORDER — OXYCODONE HYDROCHLORIDE AND ACETAMINOPHEN 5; 325 MG/1; MG/1
1 TABLET ORAL ONCE AS NEEDED
Status: DISCONTINUED | OUTPATIENT
Start: 2021-07-22 | End: 2021-07-22 | Stop reason: HOSPADM

## 2021-07-22 RX ORDER — SODIUM CHLORIDE 0.9 % (FLUSH) 0.9 %
3 SYRINGE (ML) INJECTION EVERY 12 HOURS SCHEDULED
Status: DISCONTINUED | OUTPATIENT
Start: 2021-07-22 | End: 2021-07-22 | Stop reason: HOSPADM

## 2021-07-22 RX ORDER — BUPIVACAINE HCL/0.9 % NACL/PF 0.1 %
2 PLASTIC BAG, INJECTION (ML) EPIDURAL ONCE
Status: COMPLETED | OUTPATIENT
Start: 2021-07-22 | End: 2021-07-22

## 2021-07-22 RX ORDER — LABETALOL HYDROCHLORIDE 5 MG/ML
5 INJECTION, SOLUTION INTRAVENOUS
Status: DISCONTINUED | OUTPATIENT
Start: 2021-07-22 | End: 2021-07-22 | Stop reason: HOSPADM

## 2021-07-22 RX ORDER — LIDOCAINE HYDROCHLORIDE 20 MG/ML
INJECTION, SOLUTION EPIDURAL; INFILTRATION; INTRACAUDAL; PERINEURAL AS NEEDED
Status: DISCONTINUED | OUTPATIENT
Start: 2021-07-22 | End: 2021-07-22 | Stop reason: SURG

## 2021-07-22 RX ORDER — OXYCODONE AND ACETAMINOPHEN 10; 325 MG/1; MG/1
1 TABLET ORAL ONCE AS NEEDED
Status: DISCONTINUED | OUTPATIENT
Start: 2021-07-22 | End: 2021-07-22 | Stop reason: HOSPADM

## 2021-07-22 RX ORDER — LIDOCAINE HYDROCHLORIDE 10 MG/ML
0.5 INJECTION, SOLUTION EPIDURAL; INFILTRATION; INTRACAUDAL; PERINEURAL ONCE
Status: COMPLETED | OUTPATIENT
Start: 2021-07-22 | End: 2021-07-22

## 2021-07-22 RX ORDER — ROPIVACAINE HYDROCHLORIDE 5 MG/ML
INJECTION, SOLUTION EPIDURAL; INFILTRATION; PERINEURAL AS NEEDED
Status: DISCONTINUED | OUTPATIENT
Start: 2021-07-22 | End: 2021-07-22 | Stop reason: HOSPADM

## 2021-07-22 RX ORDER — LIDOCAINE HYDROCHLORIDE 10 MG/ML
0.5 INJECTION, SOLUTION EPIDURAL; INFILTRATION; INTRACAUDAL; PERINEURAL ONCE AS NEEDED
Status: DISCONTINUED | OUTPATIENT
Start: 2021-07-22 | End: 2021-07-22 | Stop reason: HOSPADM

## 2021-07-22 RX ORDER — ONDANSETRON 4 MG/1
4 TABLET, FILM COATED ORAL EVERY 8 HOURS PRN
Qty: 20 TABLET | Refills: 0 | Status: SHIPPED | OUTPATIENT
Start: 2021-07-22 | End: 2021-09-20

## 2021-07-22 RX ADMIN — LIDOCAINE HYDROCHLORIDE 100 MG: 20 INJECTION, SOLUTION EPIDURAL; INFILTRATION; INTRACAUDAL; PERINEURAL at 07:03

## 2021-07-22 RX ADMIN — SODIUM CHLORIDE, POTASSIUM CHLORIDE, SODIUM LACTATE AND CALCIUM CHLORIDE: 600; 310; 30; 20 INJECTION, SOLUTION INTRAVENOUS at 08:28

## 2021-07-22 RX ADMIN — ACETAMINOPHEN 1000 MG: 500 TABLET, FILM COATED ORAL at 06:46

## 2021-07-22 RX ADMIN — FENTANYL CITRATE 25 MCG: 50 INJECTION, SOLUTION INTRAMUSCULAR; INTRAVENOUS at 09:05

## 2021-07-22 RX ADMIN — Medication 2 G: at 07:08

## 2021-07-22 RX ADMIN — ONDANSETRON 4 MG: 2 INJECTION INTRAMUSCULAR; INTRAVENOUS at 08:56

## 2021-07-22 RX ADMIN — FENTANYL CITRATE 50 MCG: 50 INJECTION, SOLUTION INTRAMUSCULAR; INTRAVENOUS at 07:24

## 2021-07-22 RX ADMIN — ONDANSETRON 4 MG: 2 INJECTION INTRAMUSCULAR; INTRAVENOUS at 07:24

## 2021-07-22 RX ADMIN — PROPOFOL 200 MG: 10 INJECTION, EMULSION INTRAVENOUS at 07:03

## 2021-07-22 RX ADMIN — FENTANYL CITRATE 25 MCG: 50 INJECTION, SOLUTION INTRAMUSCULAR; INTRAVENOUS at 08:55

## 2021-07-22 RX ADMIN — MIDAZOLAM 1 MG: 1 INJECTION INTRAMUSCULAR; INTRAVENOUS at 06:46

## 2021-07-22 RX ADMIN — FENTANYL CITRATE 25 MCG: 50 INJECTION, SOLUTION INTRAMUSCULAR; INTRAVENOUS at 09:10

## 2021-07-22 RX ADMIN — FENTANYL CITRATE 25 MCG: 50 INJECTION, SOLUTION INTRAMUSCULAR; INTRAVENOUS at 09:00

## 2021-07-22 RX ADMIN — FENTANYL CITRATE 50 MCG: 50 INJECTION, SOLUTION INTRAMUSCULAR; INTRAVENOUS at 07:59

## 2021-07-22 RX ADMIN — LIDOCAINE HYDROCHLORIDE 100 MG: 20 INJECTION, SOLUTION EPIDURAL; INFILTRATION; INTRACAUDAL; PERINEURAL at 08:33

## 2021-07-22 RX ADMIN — SODIUM CHLORIDE, POTASSIUM CHLORIDE, SODIUM LACTATE AND CALCIUM CHLORIDE 100 ML/HR: 600; 310; 30; 20 INJECTION, SOLUTION INTRAVENOUS at 06:29

## 2021-07-22 RX ADMIN — GLYCOPYRROLATE 0.4 MG: 0.2 INJECTION, SOLUTION INTRAMUSCULAR; INTRAVENOUS at 08:28

## 2021-07-22 RX ADMIN — FENTANYL CITRATE 50 MCG: 50 INJECTION, SOLUTION INTRAMUSCULAR; INTRAVENOUS at 07:03

## 2021-07-22 RX ADMIN — FENTANYL CITRATE 50 MCG: 50 INJECTION, SOLUTION INTRAMUSCULAR; INTRAVENOUS at 08:30

## 2021-07-22 RX ADMIN — DEXAMETHASONE SODIUM PHOSPHATE 4 MG: 4 INJECTION, SOLUTION INTRA-ARTICULAR; INTRALESIONAL; INTRAMUSCULAR; INTRAVENOUS; SOFT TISSUE at 07:24

## 2021-07-22 RX ADMIN — ROCURONIUM BROMIDE 30 MG: 50 INJECTION INTRAVENOUS at 07:03

## 2021-07-22 RX ADMIN — LIDOCAINE HYDROCHLORIDE 0.5 ML: 10 INJECTION, SOLUTION EPIDURAL; INFILTRATION; INTRACAUDAL; PERINEURAL at 06:20

## 2021-07-22 RX ADMIN — Medication 3 MG: at 08:28

## 2021-07-22 RX ADMIN — OXYCODONE HYDROCHLORIDE AND ACETAMINOPHEN 2 TABLET: 7.5; 325 TABLET ORAL at 09:19

## 2021-07-22 NOTE — ANESTHESIA POSTPROCEDURE EVALUATION
Patient: Miranda Elliott    Procedure Summary     Date: 07/22/21 Room / Location:  [facility] OR  /  [facility]    Anesthesia Start: 0658 Anesthesia Stop: 0845    Procedure: LEFT KNEE ARTHROSCOPIC PARTIAL MEDIAL MENISECTOMY (Left Knee) Diagnosis: (LEFT KNEE PAIN)    Surgeons: Kevin Tovar MD Provider: Sravani Muse CRNA    Anesthesia Type: general ASA Status: 2          Anesthesia Type: general    Vitals  Vitals Value Taken Time   /81 07/22/21 0945   Temp 98.4 °F (36.9 °C) 07/22/21 0945   Pulse 65 07/22/21 0951   Resp 16 07/22/21 0945   SpO2 95 % 07/22/21 0951   Vitals shown include unvalidated device data.        Post Anesthesia Care and Evaluation    Patient location during evaluation: PACU  Patient participation: complete - patient participated  Level of consciousness: awake and alert  Pain management: adequate  Airway patency: patent  Anesthetic complications: No anesthetic complications    Cardiovascular status: acceptable  Respiratory status: acceptable  Hydration status: acceptable    Comments: Blood pressure 149/82, pulse 87, temperature 98.4 °F (36.9 °C), temperature source Temporal, resp. rate 20, SpO2 96 %, not currently breastfeeding.    Pt discharged from PACU based on tiarra score >8

## 2021-07-22 NOTE — ANESTHESIA PREPROCEDURE EVALUATION
Anesthesia Evaluation     Patient summary reviewed   no history of anesthetic complications:  NPO Solid Status: > 8 hours  NPO Liquid Status: > 8 hours           Airway   Mallampati: III  TM distance: >3 FB  Neck ROM: limited  Dental          Pulmonary    (+) a smoker Current Abstained day of surgery,   Cardiovascular   Exercise tolerance: good (4-7 METS)    (-) pacemaker, hypertension, valvular problems/murmurs, dysrhythmias, cardiac stents, CABG      Neuro/Psych  (+) neuromuscular disease, numbness, psychiatric history Anxiety,     (-) seizures, CVA    ROS Comment: Vertigo   GI/Hepatic/Renal/Endo    (+) obesity,     (-) liver disease, no renal disease, diabetes, no thyroid disorder    Musculoskeletal     (+) neck pain,   Abdominal   (+) obese,    Substance History      OB/GYN          Other                        Anesthesia Plan    ASA 2     general     intravenous induction     Anesthetic plan, all risks, benefits, and alternatives have been provided, discussed and informed consent has been obtained with: patient.

## 2021-07-22 NOTE — ANESTHESIA PROCEDURE NOTES
Airway  Urgency: elective    Date/Time: 7/22/2021 7:06 AM  Airway not difficult    General Information and Staff    Patient location during procedure: OR  CRNA: Sravani Muse CRNA    Indications and Patient Condition  Indications for airway management: airway protection    Preoxygenated: yes  Mask difficulty assessment: 2 - vent by mask + OA or adjuvant +/- NMBA    Final Airway Details  Final airway type: endotracheal airway      Successful airway: ETT  Cuffed: yes   Successful intubation technique: video laryngoscopy  Facilitating devices/methods: Bougie and anterior pressure/BURP  Endotracheal tube insertion site: oral  Blade: Pulido  Blade size: 3  ETT size (mm): 7.0  Cormack-Lehane Classification: grade IIb - view of arytenoids or posterior of glottis only  Placement verified by: chest auscultation and capnometry   Cuff volume (mL): 5  Measured from: lips  ETT/EBT  to lips (cm): 21  Number of attempts at approach: 1  Assessment: lips, teeth, and gum same as pre-op and atraumatic intubation    Additional Comments  Atraumatic

## 2021-07-22 NOTE — DISCHARGE INSTRUCTIONS

## 2021-07-22 NOTE — OP NOTE
Operative Report    Pt Name: Miranda Elliott  MRN: [id number]  YOB: 1965  Date: 7/22/2021      OPERATIVE NOTE    PREOPERATIVE DIAGNOSIS:    1.)  Left knee medial meniscus tear    POSTOPERATIVE DIAGNOSIS:    1.) Left  knee posterior root medial meniscus tear    PROCEDURE:    1.)  Left  knee arthroscopic repair of a posterior root meniscus tear     SURGEON:  Kevin Tovar M.D.    ASSISTANT: Rachel Brunson    ANESTHESIA:  General    ESTIMATED BLOOD LOSS:  Minimal    COMPLICATIONS:  None.    CONDITION:  Stable.    IMPLANTS:  None    HISTORY OF PRESENT ILLNESS: The patient is a 55-year old patient who presented to the outpatient clinic with complaints of medial-sided left knee pain with mechanical symptoms.   MRI confirmed a complex tear of the medial meniscus. Based on this, the decision was made to take the patient to the operating room for the above-mentioned procedure. After the risks and benefits had been discussed with the patient agreed to proceed.     DESCRIPTION OF PROCEDURE:  The patient was interviewed in the preanesthesia area where the operative extremity was marked with a marking pen.  The patient was then taken to the operative suite were general endotracheal anesthesia was performed per the anesthesia team.  A timeout was called to confirm the patient, the operative site, the planned procedure, and the administration of antibiotics.  The patient was then prepped and draped in a standard sterile fashion using ChloraPrep.  The operative site was exsanguinated with an Esmarch and the tourniquet was inflated to 250.    A standard anterolateral arthroscopic portal was established with a #11 blade scalpel. The scope and trocar was introduced into the notch and then into the suprapatellar pouch.      Patient demonstrated a normal suprapatellar pouch.  The undersurface of the patella and trochlea were intact as well.    The knee was then dropped into flexion and a spinal needle was brought in just above  the medial meniscus followed by 11 blade scalpel and then a probe.  The medial compartment was then examined.  Patient had some mild chondromalacia on the weightbearing surface the medial femoral condyle.  Tibial plateau itself was intact.  The meniscus was intact with the exception of a posterior root tear.        The scope was then taken across the notch where the ACL and PCL were found to be intact. There was no notch osteophyte.    The knee was then placed in a figure of-four position where the entire lateral compartment was found to be intact including the lateral meniscus lateral femoral condyle and tibial plateau.    Attention was then turned to the meniscal root repair.  Shaver and radiofrequency wand were used to to debride the tissue beneath the root.  A guide was then used to drill a guidepin from the medial tibial face into the footprint of the medial meniscus root.  A 4.5 mm reamer was then used to ream a bony socket and [id number] flip cutter device was then used to make about a 5 mm in depth bony socket.    A meniscal suture passer was then used to pass two 2-0 nonabsorbable loop sutures around the meniscus root.  The sutures were then shuttled down through the tunnel using a suture passer.  This docked the meniscal root into the bony socket anatomically.  The knee was then cycled approximately 10-15 times with tension across the sutures.  The knee was then brought into extension where the sutures were fixated into a [employer] 4.5 peek footprint anchor.    The scope was reintroduced into the knee to confirm the repair.  The incision for the anchor was then closed with a Vicryl and a superglue wound closure and the portals were closed with a 3-0 nylon suture.  The patient was then placed in a sterile dressing and awoke from anesthesia without difficulty and was transferred to the PACU in stable condition. All sponge, needle and instrument counts were correct at the end of the procedure.     cc:          Kevin Tovar M.D

## 2021-07-22 NOTE — ANESTHESIA POSTPROCEDURE EVALUATION
Patient: Miranda Elliott    Procedure Summary     Date: 07/22/21 Room / Location:  [facility]  /  [facility]    Anesthesia Start: 0658 Anesthesia Stop: 0845    Procedure: LEFT KNEE ARTHROSCOPIC PARTIAL MEDIAL MENISECTOMY (Left Knee) Diagnosis: (LEFT KNEE PAIN)    Surgeons: Kevin Tovar MD Provider: Sravani Muse CRNA    Anesthesia Type: general ASA Status: 2          Anesthesia Type: general    Vitals  Vitals Value Taken Time   /73 07/22/21 0935   Temp 97.7 °F (36.5 °C) 07/22/21 0842   Pulse 66 07/22/21 0945   Resp 16 07/22/21 0930   SpO2 95 % 07/22/21 0945   Vitals shown include unvalidated device data.        Post Anesthesia Care and Evaluation    Patient location during evaluation: PHASE II  Patient participation: complete - patient participated  Level of consciousness: awake and awake and alert  Pain score: 0  Pain management: adequate  Airway patency: patent  Anesthetic complications: No anesthetic complications  PONV Status: none  Cardiovascular status: acceptable  Respiratory status: acceptable  Hydration status: acceptable    Comments: Patient discharged according to acceptable David score per RN assessment. See nursing records for further information.     Blood pressure 164/89, pulse 62, temperature 97.7 °F (36.5 °C), temperature source Temporal, resp. rate 16, SpO2 97 %, not currently breastfeeding.

## 2021-07-22 NOTE — BRIEF OP NOTE
KNEE ARTHROSCOPY  Progress Note    Miranda Elliott  7/22/2021    Pre-op Diagnosis:   LEFT KNEE PAIN       Post-Op Diagnosis Codes:  Left knee posterior meniscus root tear    Procedure/CPT® Codes:        Procedure(s):  Left knee arthroscopic posterior medial meniscus root repair    Surgeon(s):  Kevin Tovar MD    Anesthesia: General    Staff:   Circulator: Marleny Draper RN; Winston Knowles RN  Scrub Person: Nickolas Harman; William Thornton  Assistant: Rachel Brunson CST  Assistant: Rachel Brunson CST      Estimated Blood Loss: none          Kevin Tovar MD     Date: 7/22/2021  Time: 08:46 CDT

## 2021-07-29 ENCOUNTER — TELEPHONE (OUTPATIENT)
Dept: OBSTETRICS AND GYNECOLOGY | Facility: CLINIC | Age: 56
End: 2021-07-29

## 2021-07-29 ENCOUNTER — TELEMEDICINE (OUTPATIENT)
Dept: OBSTETRICS AND GYNECOLOGY | Facility: CLINIC | Age: 56
End: 2021-07-29

## 2021-07-29 VITALS — SYSTOLIC BLOOD PRESSURE: 112 MMHG | DIASTOLIC BLOOD PRESSURE: 76 MMHG

## 2021-07-29 DIAGNOSIS — F41.9 ANXIETY: Primary | ICD-10-CM

## 2021-07-29 PROCEDURE — 99213 OFFICE O/P EST LOW 20 MIN: CPT | Performed by: NURSE PRACTITIONER

## 2021-07-29 RX ORDER — ALPRAZOLAM 1 MG/1
1 TABLET ORAL 3 TIMES DAILY PRN
Qty: 90 TABLET | Refills: 0 | Status: SHIPPED | OUTPATIENT
Start: 2021-07-29

## 2021-07-29 NOTE — PROGRESS NOTES
Subjective     Miranda Elliott is a 55 y.o. female    You have chosen to receive care through a telehealth visit.  Do you consent to use a video/audio connection for your medical care today? Yes    Patient calls today with complaint of anxiety.  She has recently had knee surgery and is very anxious regarding the surgery and the fact she can be weightbearing for about 12 weeks.      Anxiety  Presents for initial visit. Onset was 1 to 4 weeks ago. The problem has been gradually worsening. Symptoms include excessive worry and nervous/anxious behavior. Patient reports no chest pain, compulsions, confusion, decreased concentration, depressed mood, dizziness, dry mouth, feeling of choking, hyperventilation, impotence, insomnia, irritability, malaise, muscle tension, nausea, obsessions, palpitations, panic, restlessness, shortness of breath or suicidal ideas. Symptoms occur most days. The severity of symptoms is moderate. The symptoms are aggravated by social activities. The quality of sleep is fair. Nighttime awakenings: occasional.     Risk factors include a major life event.         /76   LMP  (LMP Unknown)     Outpatient Encounter Medications as of 7/29/2021   Medication Sig Dispense Refill   • oxyCODONE-acetaminophen (PERCOCET) 5-325 MG per tablet Take 1 tablet by mouth Every 4 (Four) Hours As Needed for Severe Pain . May take 1 tab po q 4 hours 28 tablet 0   • varenicline (Chantix Continuing Month Aayush) 1 MG tablet Take 1 tablet by mouth See Admin Instructions. 42 tablet 0   • [DISCONTINUED] ALPRAZolam (XANAX) 1 MG tablet Take 1 mg by mouth 2 (Two) Times a Day As Needed for Anxiety.     • ALPRAZolam (XANAX) 1 MG tablet Take 1 tablet by mouth 3 (Three) Times a Day As Needed for Anxiety. 90 tablet 0   • ondansetron (ZOFRAN) 4 MG tablet Take 1 tablet by mouth Every 8 (Eight) Hours As Needed for Nausea or Vomiting. 20 tablet 0   • traMADol (ULTRAM) 50 MG tablet Take 50 mg by mouth Every 8 (Eight) Hours As Needed.        No facility-administered encounter medications on file as of 7/29/2021.       Surgical History  Past Surgical History:   Procedure Laterality Date   • ADENOIDECTOMY     • BACK SURGERY  1998    Lumbar gallagher   • BACK SURGERY  2012    lumbar fusion   • COLONOSCOPY  2012   • ENDOMETRIAL ABLATION     • KNEE ARTHROSCOPY Left 7/22/2021    Procedure: LEFT KNEE ARTHROSCOPIC PARTIAL MEDIAL MENISECTOMY;  Surgeon: Kevin Tovar MD;  Location: [facility];  Service: Orthopedics;  Laterality: Left;   • SALPINGO OOPHORECTOMY Right    • TONSILLECTOMY         Family History  Family History   Problem Relation Age of Onset   • Diabetes Father    • No Known Problems Mother    • No Known Problems Sister    • Prostate cancer Maternal Grandfather    • Breast cancer Neg Hx    • Ovarian cancer Neg Hx    • Uterine cancer Neg Hx    • Colon cancer Neg Hx    • Melanoma Neg Hx        The following portions of the patient's history were reviewed and updated as appropriate: allergies, current medications, past family history, past medical history, past social history, past surgical history and problem list.    Review of Systems   Constitutional: Negative for activity change, appetite change, chills, diaphoresis, fatigue, fever, irritability, unexpected weight gain and unexpected weight loss.   HENT: Negative for congestion, dental problem, drooling, ear discharge, ear pain, facial swelling, hearing loss, mouth sores, nosebleeds, postnasal drip, rhinorrhea, sinus pressure, sneezing, sore throat, swollen glands, tinnitus, trouble swallowing and voice change.    Eyes: Negative for blurred vision, double vision, photophobia, pain, discharge, redness, itching and visual disturbance.   Respiratory: Negative for apnea, cough, choking, chest tightness, shortness of breath, wheezing and stridor.    Cardiovascular: Negative for chest pain, palpitations and leg swelling.   Gastrointestinal: Negative for abdominal distention, abdominal pain, anal bleeding,  blood in stool, constipation, diarrhea, nausea, rectal pain, vomiting, GERD and indigestion.   Endocrine: Negative for cold intolerance, heat intolerance, polydipsia, polyphagia and polyuria.   Genitourinary: Negative for amenorrhea, breast discharge, breast lump, breast pain, decreased libido, decreased urine volume, difficulty urinating, dyspareunia, dysuria, flank pain, frequency, genital sores, hematuria, impotence, menstrual problem, pelvic pain, pelvic pressure, urgency, urinary incontinence, vaginal bleeding, vaginal discharge and vaginal pain.   Musculoskeletal: Negative for arthralgias, back pain, gait problem, joint swelling, myalgias, neck pain, neck stiffness and bursitis.   Skin: Negative for color change, dry skin and rash.   Allergic/Immunologic: Negative for environmental allergies, food allergies and immunocompromised state.   Neurological: Negative for dizziness, tremors, seizures, syncope, facial asymmetry, speech difficulty, weakness, light-headedness, numbness, headache, memory problem and confusion.   Hematological: Negative for adenopathy. Does not bruise/bleed easily.   Psychiatric/Behavioral: Negative for agitation, behavioral problems, decreased concentration, dysphoric mood, hallucinations, self-injury, sleep disturbance, suicidal ideas, negative for hyperactivity, depressed mood and stress. The patient is nervous/anxious. The patient does not have insomnia.        Objective   Physical Exam  Vitals and nursing note reviewed.   Constitutional:       Appearance: She is well-developed.   HENT:      Head: Normocephalic and atraumatic.   Eyes:      General:         Right eye: No discharge.         Left eye: No discharge.      Conjunctiva/sclera: Conjunctivae normal.   Neck:      Thyroid: No thyromegaly.   Cardiovascular:      Rate and Rhythm: Normal rate and regular rhythm.      Heart sounds: Normal heart sounds.   Pulmonary:      Effort: Pulmonary effort is normal.      Breath sounds: Normal  breath sounds.   Musculoskeletal:      Cervical back: Normal range of motion and neck supple.   Skin:     General: Skin is warm and dry.   Neurological:      Mental Status: She is alert and oriented to person, place, and time.   Psychiatric:         Mood and Affect: Mood normal.         Behavior: Behavior normal.         Thought Content: Thought content normal.         Judgment: Judgment normal.         Assessment/Plan   Diagnoses and all orders for this visit:    1. Anxiety (Primary)  Comments:  Patient calls with complaint of anxiety.  Patient recently had knee surgery and is having a lot of anxiety and she cannot weight-bear for approximately 12 weeks.  She is taken Xanax in the past and has done well.  She is given a prescription and Anson is reviewed.  She has a yearly checkup scheduled in the fall.  Orders:  -     ALPRAZolam (XANAX) 1 MG tablet; Take 1 tablet by mouth 3 (Three) Times a Day As Needed for Anxiety.  Dispense: 90 tablet; Refill: 0     This was an audio and video enabled telemedicine encounter.      Patient's There is no height or weight on file to calculate BMI. indicating that she is overweight (BMI 25-29.9). Obesity-related health conditions include the following: none. Obesity is unchanged. BMI is is above average; BMI management plan is completed. We discussed portion control and increasing exercise..      Luzmaria Calhoun, APRN  7/29/2021

## 2021-07-29 NOTE — PATIENT INSTRUCTIONS
"BMI for Adults  What is BMI?  Body mass index (BMI) is a number that is calculated from a person's weight and height. BMI can help estimate how much of a person's weight is composed of fat. BMI does not measure body fat directly. Rather, it is an alternative to procedures that directly measure body fat, which can be difficult and expensive.  BMI can help identify people who may be at higher risk for certain medical problems.  What are BMI measurements used for?  BMI is used as a screening tool to identify possible weight problems. It helps determine whether a person is obese, overweight, a healthy weight, or underweight.  BMI is useful for:  · Identifying a weight problem that may be related to a medical condition or may increase the risk for medical problems.  · Promoting changes, such as changes in diet and exercise, to help reach a healthy weight. BMI screening can be repeated to see if these changes are working.  How is BMI calculated?  BMI involves measuring your weight in relation to your height. Both height and weight are measured, and the BMI is calculated from those numbers. This can be done either in English ([place] or metric measurements. Note that charts and online BMI calculators are available to help you find your BMI quickly and easily without having to do these calculations yourself.  To calculate your BMI in English ([place].S[place] measurements:    1. Measure your weight in pounds (lb).  2. Multiply the number of pounds by 703.  ? For example, for a person who weighs 180 lb, multiply that number by 703, which equals 126,540.  3. Measure your height in inches. Then multiply that number by itself to get a measurement called \"inches squared.\"  ? For example, for a person who is 70 inches tall, the \"inches squared\" measurement is 70 inches x 70 inches, which equals 4,900 inches squared.  4. Divide the total from step 2 (number of lb x 703) by the total from step 3 (inches squared): 126,540 ÷ 4,900 = 25.8. This is " "your BMI.  To calculate your BMI in metric measurements:  1. Measure your weight in kilograms (kg).  2. Measure your height in meters (m). Then multiply that number by itself to get a measurement called \"meters squared.\"  ? For example, for a person who is 1.75 m tall, the \"meters squared\" measurement is 1.75 m x 1.75 m, which is equal to 3.1 meters squared.  3. Divide the number of kilograms (your weight) by the meters squared number. In this example: 70 ÷ 3.1 = 22.6. This is your BMI.  What do the results mean?  BMI charts are used to identify whether you are underweight, normal weight, overweight, or obese. The following guidelines will be used:  · Underweight: BMI less than 18.5.  · Normal weight: BMI between 18.5 and 24.9.  · Overweight: BMI between 25 and 29.9.  · Obese: BMI of 30 or above.  Keep these notes in mind:  · Weight includes both fat and muscle, so someone with a muscular build, such as an athlete, may have a BMI that is higher than 24.9. In cases like these, BMI is not an accurate measure of body fat.  · To determine if excess body fat is the cause of a BMI of 25 or higher, further assessments may need to be done by a health care provider.  · BMI is usually interpreted in the same way for men and women.  Where to find more information  For more information about BMI, including tools to quickly calculate your BMI, go to these websites:  · Centers for Disease Control and Prevention: www.cdc.gov  · American Heart Association: www.heart.org  · National Heart, Lung, and Blood [place]: www.nhlbi.nih.gov  Summary  · Body mass index (BMI) is a number that is calculated from a person's weight and height.  · BMI may help estimate how much of a person's weight is composed of fat. BMI can help identify those who may be at higher risk for certain medical problems.  · BMI can be measured using English measurements or metric measurements.  · BMI charts are used to identify whether you are underweight, normal " weight, overweight, or obese.  This information is not intended to replace advice given to you by your health care provider. Make sure you discuss any questions you have with your health care provider.  Document Revised: 09/09/2020 Document Reviewed: 07/17/2020  Elsevier Patient Education © 2021 [employer]

## 2021-07-29 NOTE — TELEPHONE ENCOUNTER
Patient calls requesting refill of Xanax. Patient understands that she has to be seen but is requesting a video visit due to recent knee surgery. Patient states she is unable to put any weight on her leg, is in a wheel chair, is unable to work and is feeling very anxious. Please advise.

## 2021-08-06 ENCOUNTER — TELEPHONE (OUTPATIENT)
Dept: PHYSICAL THERAPY | Facility: CLINIC | Age: 56
End: 2021-08-06

## 2021-08-06 NOTE — TELEPHONE ENCOUNTER
Ms. Elliott presented today for a courtesy visit for assistance in crutch training, navigating stairs and transferring to/from floor. She is NWB on the LLE following a recent knee surgery. In addition, the patient reported having decreased sensation on the R foot and posterior RLE.     PT had patient ambulate with rolling walker which allowed for more stability. Patient felt more comfortable with the rolling walker. Suggested she contact her doctor to see if one could be ordered. She stated she would see if a friend had one she could borrow as she had borrowed crutches as well. Educated her on how to properly adjust the rolling walker so that the handles were at the height of her wrist crease and allowed for slight elbow flexion. Also adjusted crutches so that they were at a more appropriate setting.      For stairs, the patient previously used the scooting method. Encouraged patient to continue using the scooting method and to have parent assist in holding the LLE up to maintain NWB status. She stated she was comfortable doing this.     Transferring floor <> standing was her primary concern. PT gave permission to be recorded performing the transfers so that the patient can have the video for reference at home. Showed her how to transfer utilizing squat pivot method from wheelchair to lower step. Upon ascending the steps, the patient was shown a method of utilizing two chairs to transfer. She was encouraged to roll onto R knee, use BUE to push to standing, then have second chair ready behind her so she can sit. She stated that she would always have someone with her when performing the transfers and going up/down steps. Really encouraged her father (who was present for this) to assist in holding up the LLE to maintain the NWB as patient had tendency to lower the L foot onto the ground.     I spent 60 minutes with Ms. Elliott assisting her in figuring out the safest methods to ambulate and transfer. She had no  questions at end of session and was appreciative of the assistance. Did inform her if she felt she needed further assistance with gait training or transfers, she would need to return for an initial evaluation and subsequent treatment sessions.      Ann Marie Ha, PT, DPT

## 2021-09-03 ENCOUNTER — TREATMENT (OUTPATIENT)
Dept: PHYSICAL THERAPY | Facility: CLINIC | Age: 56
End: 2021-09-03

## 2021-09-03 DIAGNOSIS — Z98.890 STATUS POST MEDIAL MENISCUS REPAIR: ICD-10-CM

## 2021-09-03 DIAGNOSIS — R26.9 GAIT DISTURBANCE: Primary | ICD-10-CM

## 2021-09-03 PROCEDURE — 97161 PT EVAL LOW COMPLEX 20 MIN: CPT | Performed by: PHYSICAL THERAPIST

## 2021-09-03 NOTE — PROGRESS NOTES
Physical Therapy Therapy Initial Evaluation and Plan of Care    Patient: Miranda Elliott               : 1965  Visit Date: 9/3/2021  Referring practitioner: Kevin Tovar MD  Date of Initial Visit: 9/3/2021  Patient seen for 1 sessions    Visit Diagnoses:    ICD-10-CM ICD-9-CM   1. Gait disturbance  R26.9 781.2   2. Status post medial meniscus repair  Z98.890 V45.89     Past Medical History:   Diagnosis Date   • Abnormal Pap smear of cervix    • Anxiety    • Cervical dysplasia    • Medial meniscus tear    • Seasonal allergies      Past Surgical History:   Procedure Laterality Date   • ADENOIDECTOMY     • BACK SURGERY      Lumbar gallagher   • BACK SURGERY      lumbar fusion   • COLONOSCOPY  2012   • ENDOMETRIAL ABLATION     • KNEE ARTHROSCOPY Left 2021    Procedure: LEFT KNEE ARTHROSCOPIC PARTIAL MEDIAL MENISECTOMY;  Surgeon: Kevin Tovar MD;  Location: [facility];  Service: Orthopedics;  Laterality: Left;   • SALPINGO OOPHORECTOMY Right    • TONSILLECTOMY           SUBJECTIVE     Subjective   Outcome Measure:        OBJECTIVE     Objective          Active Range of Motion   Left Knee   Flexion: 78 degrees   Extension: 1 degrees   Extensor la degrees     Swelling     Left Knee Girth Measurement (cm)   Joint line: 42 cm    Right Knee Girth Measurement (cm)   Joint line: 40 cm    Ambulation     Ambulation: Level Surfaces     Additional Level Surfaces Ambulation Details  Adjusted her crutches as she was really leaning and hanging on them under her arms. Raised the handles on them and this helped her stand more upright. Cued her to try to put entire foot down with each step and take as normal a step as she can while controlling the WB through it by the use of her arms.       Manual Therapy     86040  Comments   Applied kinesiotape for swelling of L knee Fan cut anchored at proximal thigh with 4 strips down crossing anterior knee                   Timed Minutes 10        Therapy Education/Self Care 57538    Details: May try walker instead of crutches.   Given mobility training   Progress: New   Education provided to:  Patient   Level of understanding Verbalized   Timed Minutes          Total Timed Treatment:    10    mins  Total Time of Visit:            60   mins    ASSESSMENT/PLAN     GOALS:  Goals                                          Progress Note due by 10/3/2021   STG by: 3 weeks  Comments Date Status   Patient will demonstrate 90 deg Active L knee ROM         Patient will demonstrate L knee active extension to neutral          Patient will demonstrate decreased L knee edema                    LTG by: 6 weeks          Patient will demonstrate independence with comprehensive HEP         Patient will demonstrate >125 deg active L knee flexion          Patient will ambulate >250 ft with normal gait pattern          Patient will perform ascending/descending stairs reciprocally utilizing handrail as needed                    Assessment & Plan     Assessment  Impairments: abnormal coordination, abnormal gait, abnormal muscle firing, abnormal or restricted ROM, impaired balance, impaired physical strength, lacks appropriate home exercise program, pain with function and weight-bearing intolerance  Assessment details: Miranda would benefit from PT to work on improving the mobility and strength of the LLE following a meniscus root repair. She is still using crutches but allowed to bear as much weight as tolerated through the LLE. 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   Barriers to therapy: none  Prognosis: good  Functional Limitations: walking, uncomfortable because of pain, standing and stooping  Plan  Therapy options: will be seen for skilled physical therapy services  Planned modality interventions: low level laser therapy, dry needling, high voltage pulsed current (pain management) and cryotherapy  Planned therapy  interventions: manual therapy, balance/weight-bearing training, neuromuscular re-education, orthotic fitting/training, postural training, soft tissue mobilization, spinal/joint mobilization, strengthening, stretching, therapeutic activities, home exercise program, gait training, functional ROM exercises and flexibility  Frequency: 3x week  Duration in weeks: 6  Treatment plan discussed with: patient  Plan details: Progress strength and functional mobility following meniscus repair. Brace locked at 90        PT SIGNATURE: Viv Draper, PT, DPT, CLT-CHANDAN       Initial Certification  Certification Period: 9/3/2021 through 12/2/2021  I certify that the therapy services are furnished while this patient is under my care.  The services outlined above are required by this patient, and will be reviewed every 90 days.     PHYSICIAN:     Kevin Tovar MD______________________________________DATE: _________     Please sign and return via fax to [phone].   Thank you so much for letting us work with Miranda. I appreciate your letting us work with your patients. If you have any questions or concerns, please don't hesitate to contact me.

## 2021-09-08 ENCOUNTER — TREATMENT (OUTPATIENT)
Dept: PHYSICAL THERAPY | Facility: CLINIC | Age: 56
End: 2021-09-08

## 2021-09-08 DIAGNOSIS — R26.9 GAIT DISTURBANCE: Primary | ICD-10-CM

## 2021-09-08 DIAGNOSIS — Z98.890 STATUS POST MEDIAL MENISCUS REPAIR: ICD-10-CM

## 2021-09-08 PROCEDURE — 97110 THERAPEUTIC EXERCISES: CPT | Performed by: PHYSICAL THERAPIST

## 2021-09-08 NOTE — PROGRESS NOTES
Physical Therapy Treatment Note    Patient: Miranda Elliott                                                                                     Visit Date: 2021  :     1965    Referring practitioner:    Kevin Tovar MD  Date of Initial Visit:          Type: THERAPY  Noted: 9/3/2021    Patient seen for 2 sessions    Visit Diagnoses:    ICD-10-CM ICD-9-CM   1. Gait disturbance  R26.9 781.2   2. Status post medial meniscus repair  Z98.890 V45.89     SUBJECTIVE     Subjective She had some swelling yesterday after work, she feels like the tape Viv applied last treatment helped.  Patient is working on exercises at home.    PAIN: 1-2/10 with standing or walking.          OBJECTIVE     Objective      Therapeutic Exercises    36902 Comments   4 Way SLR L LE no brace X 10 each direction Added to HEP   Gentle stretching L knee flexion Able to get to 84 degrees PROM   Discussed gait with crutches She starts out in good form, then forward flexed posture after 50 feet   Applied kinesiotape for swelling of L knee    Discussed compression leggings for light compression to help with L knee swelling    Timed Minutes 30       Therapy Education/Self Care 35486   Details: 4 Way SLR   Given Home Exercise Program   Progress: New   Education provided to:  Patient   Level of understanding Verbalized, Demonstrated and Teach back level of understanding   Timed Minutes          Total Timed Treatment:     30   mins  Total Time of Visit:             30   mins         ASSESSMENT/PLAN     GOALS  Goals                                          Progress Note due by 10/03/21                                                      Recert due by    STG by: 3 Weeks Comments Date Status   Patient will demonstrate 90 deg Active L knee ROM 81 degrees PROM 21 Ongoing   Patient will demonstrate L knee active extension to neutral    New   Patient will demonstrate decreased L knee edema  Applied elastic tape to decrease swelling.  She will look  into purchasing light compression leggings 9/8/21 Ongoing         LTG by: 6 Weeks      Patient will demonstrate independence with comprehensive HEP Will plan to bolster HEP next visit and include written instructions 9/8/21 Ongoing   Patient will demonstrate >125 deg active L knee flexion    New   Patient will ambulate >250 ft with normal gait pattern    New   Patient will perform ascending/descending stairs reciprocally utilizing handrail as needed     New                   Assessment/Plan     ASSESSMENT: Patient is very motivated to work on increasing L knee/LE strength.  She needs verbal and tactile cues for correct form when working on the 4 way SLR, mostly when working on hip abduction. She did have an increase in PROM for the L knee since her initial visit.  She is still lacking knee extension as she is unable to lay the L knee flat on the treatment table.     PLAN: Will work on improving strength of the L knee and L hip, will also work on improving L knee ROM. Will give patient a written HEP next treatment.     Signature: Augusta Garcia PTA, AB

## 2021-09-10 ENCOUNTER — TREATMENT (OUTPATIENT)
Dept: PHYSICAL THERAPY | Facility: CLINIC | Age: 56
End: 2021-09-10

## 2021-09-10 DIAGNOSIS — Z98.890 STATUS POST MEDIAL MENISCUS REPAIR: ICD-10-CM

## 2021-09-10 DIAGNOSIS — R26.9 GAIT DISTURBANCE: Primary | ICD-10-CM

## 2021-09-10 PROCEDURE — 97112 NEUROMUSCULAR REEDUCATION: CPT

## 2021-09-10 PROCEDURE — 97110 THERAPEUTIC EXERCISES: CPT

## 2021-09-10 NOTE — PROGRESS NOTES
Physical Therapy Treatment Note    Patient: Miranda Elliott                                                                                     Visit Date: 9/10/2021  :     1965    Referring practitioner:    Kevin Tovar MD  Date of Initial Visit:          Type: THERAPY  Noted: 9/3/2021    Patient seen for 3 sessions    Visit Diagnoses:    ICD-10-CM ICD-9-CM   1. Gait disturbance  R26.9 781.2   2. Status post medial meniscus repair  Z98.890 V45.89     SUBJECTIVE     Subjective Last night she had aching for a couple hours on the medial side of L knee. She has been doing her exercises. No complaints following last session     PAIN: 1/10 with standing or walking.          OBJECTIVE     Objective      Therapeutic Exercises    87387 Comments   Quad set L knee w bolster under ankle 2 sets x 10 reps x 5 sec hold    Knee stretching into flexion/ext     LLE SLR, no brace  Cue for quad set; 2 x 10    LLE hip adduction/abduction/extension on table, no brace  2 x 10    Gentle stretching L knee flexion Able to get to 84 degrees PROM   Supine L heel slides with brace on  X 10    Timed Minutes 35     Neuromuscular Reeducation     18456 Comments   Standing weight shifting lateral/anterior/posterior  2 x 10 each LE, cue for upright posture as she has tendency to forward flex                     Timed Minutes 10       Therapy Education/Self Care 07498   Details: See HEP details below    Given Home Exercise Program  [employer] HEP (access code [email or web address])   Progress: New   Education provided to:  Patient   Level of understanding Verbalized, Demonstrated and Teach back level of understanding   Timed Minutes      Access Code: [email or web address]  URL: [email or web address]  Date: 09/10/2021  Prepared by: Jenny Yousif    Exercises  Supine Quadricep Sets - 1 x daily - 7 x weekly - 3 sets - 10 reps  Active Straight Leg Raise with Quad Set - 1 x daily - 7 x weekly - 3 sets - 10 reps  Sidelying Hip Adduction - 1 x daily - 7 x weekly - 3  sets - 10 reps  Sidelying Hip Abduction - 1 x daily - 7 x weekly - 3 sets - 10 reps  Prone Hip Extension - 1 x daily - 7 x weekly - 3 sets - 10 reps  Standing Weight Shift Side to Side - 1 x daily - 7 x weekly - 3 sets - 10 reps      Total Timed Treatment:     45   mins  Total Time of Visit:             45   mins         ASSESSMENT/PLAN     GOALS  Goals                                          Progress Note due by 10/03/21                                                      Recert due by    STG by: 3 Weeks Comments Date Status   Patient will demonstrate 90 deg Active L knee ROM 90 deg actively today  9/10/21 MET   Patient will demonstrate L knee active extension to neutral    New   Patient will demonstrate decreased L knee edema  Applied elastic tape to decrease swelling.  She will look into purchasing light compression leggings 9/8/21 Ongoing         LTG by: 6 Weeks      Patient will demonstrate independence with comprehensive HEP Will plan to bolster HEP next visit and include written instructions 9/8/21 Ongoing   Patient will demonstrate >125 deg active L knee flexion    New   Patient will ambulate >250 ft with normal gait pattern    New   Patient will perform ascending/descending stairs reciprocally utilizing handrail as needed     New                   Assessment/Plan     ASSESSMENT: Swelling was improved according to patient, this likely attributed to her improved ROM and she demonstrated 90 deg of active ROM. Going to follow up with MD regarding surgical precautions for ROM progression as patient is locked at 90 deg. She is still limited with knee extension. Addressed weight bearing today as patient is WBAT, however not fully weight shifting over the LLE during ambulation. She performing standing weight shifting well. Provided written HEP to promote improved knee ROM and hip stability.      PLAN: Consider using neurocom next session. Apply KT tape for swelling as needed. Continue to progress knee and hip  stability exercises.     Signature: Ann Marie Ha, PT, DPT

## 2021-09-10 NOTE — PROGRESS NOTES
I have reviewed the notes, assessments, and/or procedures performed by Ann Marie Ha, PT, DPT, I concur with her/his documentation of Miranda Elliott.

## 2021-09-13 ENCOUNTER — TREATMENT (OUTPATIENT)
Dept: PHYSICAL THERAPY | Facility: CLINIC | Age: 56
End: 2021-09-13

## 2021-09-13 DIAGNOSIS — Z98.890 STATUS POST MEDIAL MENISCUS REPAIR: ICD-10-CM

## 2021-09-13 DIAGNOSIS — R26.9 GAIT DISTURBANCE: Primary | ICD-10-CM

## 2021-09-13 PROCEDURE — 97110 THERAPEUTIC EXERCISES: CPT

## 2021-09-13 PROCEDURE — 97112 NEUROMUSCULAR REEDUCATION: CPT

## 2021-09-13 NOTE — PROGRESS NOTES
Physical Therapy Treatment Note    Patient: Miranda Elliott                                                                                     Visit Date: 2021  :     1965    Referring practitioner:    Kevin Tovar MD  Date of Initial Visit:          Type: THERAPY  Noted: 9/3/2021    Patient seen for 4 sessions    Visit Diagnoses:    ICD-10-CM ICD-9-CM   1. Gait disturbance  R26.9 781.2   2. Status post medial meniscus repair  Z98.890 V45.89     SUBJECTIVE     Subjective No complaints following previous session, minimal soreness. Reports her ambulation and weight bearing has improved. She has started using 1 crutch when ambulating around house. No questions regarding HEP.     PAIN: 0-1/10 with standing or walking         OBJECTIVE     Objective      Therapeutic Exercises    10834 Comments   Quad set L knee w bolster under ankle 2 sets x 10 reps x 5 sec hold    SAQ L knee  2 sets x 10 reps    LLE SLR, no brace  Cue for quad set; 2 x 10    Standing mini squats  2 x 10    Applied kinesiotape for swelling of L knee, octopus pattern         Timed Minutes 25     Neuromuscular Reeducation     02523 Comments   Neurocom weight bearing testing (lateral, A/P) Improved with repetitions, more difficulty with posterior weight shifting while maintaining equal weight bearing laterally                    Timed Minutes 15       Therapy Education/Self Care 25432   Details: See HEP details below    Given Home Exercise Program  [employer] HEP (access code [email or web address])   Progress: New   Education provided to:  Patient   Level of understanding Verbalized, Demonstrated and Teach back level of understanding   Timed Minutes      Access Code: [email or web address]  URL: [email or web address]  Date: 09/10/2021  Prepared by: Jenny Yousif    Exercises  Supine Quadricep Sets - 1 x daily - 7 x weekly - 3 sets - 10 reps  Active Straight Leg Raise with Quad Set - 1 x daily - 7 x weekly - 3 sets - 10 reps  Sidelying Hip Adduction - 1 x daily -  7 x weekly - 3 sets - 10 reps  Sidelying Hip Abduction - 1 x daily - 7 x weekly - 3 sets - 10 reps  Prone Hip Extension - 1 x daily - 7 x weekly - 3 sets - 10 reps  Standing Weight Shift Side to Side - 1 x daily - 7 x weekly - 3 sets - 10 reps      Total Timed Treatment:     40   mins  Total Time of Visit:             40   mins         ASSESSMENT/PLAN     GOALS  Goals                                          Progress Note due by 10/03/21                                                      Recert due by    STG by: 3 Weeks Comments Date Status   Patient will demonstrate 90 deg Active L knee ROM 90 deg actively today  9/10/21 MET   Patient will demonstrate L knee active extension to neutral    New   Patient will demonstrate decreased L knee edema  Applied KT tape today for swellng  9/13/21 Ongoing         LTG by: 6 Weeks      Patient will demonstrate independence with comprehensive HEP No questions regarding HEP 9/13/21 Ongoing   Patient will demonstrate >125 deg active L knee flexion    New   Patient will ambulate >250 ft with normal gait pattern    New   Patient will perform ascending/descending stairs reciprocally utilizing handrail as needed     New                   Assessment/Plan     ASSESSMENT: Patient responded well to KT taping of L knee previously, therefore applied today. Addressed standing weight bearing today, patient performed well with visual feedback from Neurocom. Overall, patient is progress well. Still limited by brace locked at 90 deg flexion. Not quite to neutral L knee extension. Encouraged short distance ambulation with only 1 crutch.     PLAN: Work on gait mechanics to see what AD is appropriate for patient (1 crutch vs SC)     Signature: Ann Marie Ha, PT, DPT

## 2021-09-14 ENCOUNTER — TELEPHONE (OUTPATIENT)
Dept: PHYSICAL THERAPY | Facility: CLINIC | Age: 56
End: 2021-09-14

## 2021-09-14 NOTE — TELEPHONE ENCOUNTER
Patient sent PT an e-mail this evening concerned that her knee and ankle had increased swelling today. Pt proceeded to call patient. She denied red, hot or increased tenderness on the limb. Primary complaint is just swelling. Explained that this could be due to increased activity and keeping the leg in dependent position while at work. Encouraged ice, elevation and performing ankle pumps. She will be in office tomorrow and we will better address it then. Recommended contacting doctor or ER should she have a change in medical status.     Ann Marie Ha, PT, DPT

## 2021-09-15 ENCOUNTER — TREATMENT (OUTPATIENT)
Dept: PHYSICAL THERAPY | Facility: CLINIC | Age: 56
End: 2021-09-15

## 2021-09-15 DIAGNOSIS — Z98.890 STATUS POST MEDIAL MENISCUS REPAIR: ICD-10-CM

## 2021-09-15 DIAGNOSIS — R26.9 GAIT DISTURBANCE: Primary | ICD-10-CM

## 2021-09-15 PROCEDURE — 97110 THERAPEUTIC EXERCISES: CPT | Performed by: PHYSICAL THERAPIST

## 2021-09-15 PROCEDURE — 97032 APPL MODALITY 1+ESTIM EA 15: CPT | Performed by: PHYSICAL THERAPIST

## 2021-09-15 NOTE — PROGRESS NOTES
"Physical Therapy Treatment Note    Patient: Miranda Elliott                                                                                     Visit Date: 9/15/2021  :     1965    Referring practitioner:    Kevin Tovar MD  Date of Initial Visit:          Type: THERAPY  Noted: 9/3/2021    Patient seen for 5 sessions    Visit Diagnoses:    ICD-10-CM ICD-9-CM   1. Gait disturbance  R26.9 781.2   2. Status post medial meniscus repair  Z98.890 V45.89     SUBJECTIVE     Subjective Her swelling has been really bad, reporting she \"had no ankle\". It's better today. She's ordered compression socks.     PAIN: 0-110     OBJECTIVE     Objective      Therapeutic Exercises    43088 Comments   LAQ with quad set at full ext  2 x 10    Supine hip abduction bilaterally, no brace  2 x 10 ea   Supine SLR, no brace  2 x 10 ea   Supplied with size G TubiGrip for swelling and inflammation and help prevent malalignment of lockout brace    Applied sure prep and KT tape to L lateral LE  Knee in octopus pattern anchoring proximally and ending below ankle  Also applied KT tape to L knee anchored over patellar tendon and forked, ending below patella.        Timed Minutes 33     Neuromuscular Reeducation     71421 Comments   BAPS L3 A/P  1 x 10        Timed Minutes 2     Modalities Comments   Cold laser/estim combo (DCI), reclined Lateral proximal ankle    tx completed as of today: 1   Minutes 10     Therapy Education/Self Care 61659   Details:    Given Home Exercise Program  [employer] HEP (access code [email or web address])   Progress: Reinforced   Education provided to:  Patient   Level of understanding Verbalized, Demonstrated and Teach back level of understanding   Timed Minutes      Access Code: [email or web address]  Date: 09/10/2021  Prepared by: Jenny Yousif    Exercises  Supine Quadricep Sets - 1 x daily - 7 x weekly - 3 sets - 10 reps  Active Straight Leg Raise with Quad Set - 1 x daily - 7 x weekly - 3 sets - 10 reps  Sidelying Hip Adduction - 1 x " daily - 7 x weekly - 3 sets - 10 reps  Sidelying Hip Abduction - 1 x daily - 7 x weekly - 3 sets - 10 reps  Prone Hip Extension - 1 x daily - 7 x weekly - 3 sets - 10 reps  Standing Weight Shift Side to Side - 1 x daily - 7 x weekly - 3 sets - 10 reps      Total Timed Treatment:     43   mins  Total Time of Visit:             45   mins         ASSESSMENT/PLAN     GOALS  Goals                               Progress Note due by 10/03/21                                                      Recert due by 12/2/2021   STG by: 3 Weeks Comments Date Status   Patient will demonstrate 90 deg Active L knee ROM 90 deg actively today  9/10/21 MET   Patient will demonstrate L knee active extension to neutral    ongoing   Patient will demonstrate decreased L knee edema  Applied KT tape today for swellng  9/13/21 ongoing   LTG by: 6 Weeks      Patient will demonstrate independence with comprehensive HEP No questions regarding HEP 9/13/21 ongoing   Patient will demonstrate >125 deg active L knee flexion    ongoing   Patient will ambulate >250 ft with normal gait pattern    ongoing   Patient will perform ascending/descending stairs reciprocally utilizing handrail as needed   Pt reports improved ability to navigate stairs at home now, but is still limited  9/15/21 ongoing     Assessment/Plan     ASSESSMENT: Patient presents today with increased swelling in L LE so we utilized the laser stim and KT tape. Continued with hip strengthening in NWB positions.    PLAN: Assess response to tx today and if appropriate, work on gait mechanics to assess most appropriate AD (e.g. 1 crutch vs SC)     Signature: Yesenia Lake, PTA, DPT

## 2021-09-16 ENCOUNTER — TELEPHONE (OUTPATIENT)
Dept: PHYSICAL THERAPY | Facility: CLINIC | Age: 56
End: 2021-09-16

## 2021-09-16 NOTE — TELEPHONE ENCOUNTER
Spoke with Ann Marie at Dr. Tovar's office regarding post op protocol for the meniscus root repair. She stated to treat it similar to an ACL protocol. Miranda will need to continue to wear brace until she has good quad control and can perform consecutive SLR with no extension lag. Regarding ROM, the brace can be gradually opened more as she gains flexion (currently locked at 90 deg).     Ann Marie Ha, PT, DPT

## 2021-09-17 ENCOUNTER — TREATMENT (OUTPATIENT)
Dept: PHYSICAL THERAPY | Facility: CLINIC | Age: 56
End: 2021-09-17

## 2021-09-17 DIAGNOSIS — Z98.890 STATUS POST MEDIAL MENISCUS REPAIR: ICD-10-CM

## 2021-09-17 DIAGNOSIS — R26.9 GAIT DISTURBANCE: Primary | ICD-10-CM

## 2021-09-17 PROCEDURE — 97110 THERAPEUTIC EXERCISES: CPT | Performed by: PHYSICAL THERAPIST

## 2021-09-17 PROCEDURE — 97530 THERAPEUTIC ACTIVITIES: CPT | Performed by: PHYSICAL THERAPIST

## 2021-09-17 PROCEDURE — 97112 NEUROMUSCULAR REEDUCATION: CPT | Performed by: PHYSICAL THERAPIST

## 2021-09-17 NOTE — PROGRESS NOTES
Physical Therapy Treatment Note    Patient: Miranda Elliott                                                                                     Visit Date: 2021  :     1965    Referring practitioner:    Kevin Tovar MD  Date of Initial Visit:          Type: THERAPY  Noted: 9/3/2021    Patient seen for 6 sessions    Visit Diagnoses:    ICD-10-CM ICD-9-CM   1. Gait disturbance  R26.9 781.2   2. Status post medial meniscus repair  Z98.890 V45.89     SUBJECTIVE     Subjective She has had some swelling her last visit. Her swelling is better today and she thinks the tape may have helped.    PAIN: 0-110     OBJECTIVE     Objective      Therapeutic Exercises    [id number] Comments   Heel slides with quad set at end range of 45 cm SB 2 x 10    4 way hip SLR 1  X 10 each   Prone hip extension with bent knee 2 x 10               Timed Minutes 20     Neuromuscular Reeducation     36134 Comments   [place] Electrical stimulation with quad sets Max amp 16       Timed Minutes 10     Therapeutic Activities    31069 Comments   Worked on getting down to the floor and up from the floor with brace per patient request so that she can do exercises there instead of the bed at home.        Timed Minutes 10     Therapy Education/Self Care 48012   Details: Mechanics with getting up and down out of the floor with brace   Given Home Exercise Program  [employer] [facility] (access code [email or web address])   Progress: Reinforced   Education provided to:  Patient   Level of understanding Verbalized, Demonstrated and Teach back level of understanding   Timed Minutes      Access Code: [email or web address]  Date: 09/10/2021  Prepared by: Jenny Yousif    Exercises  Supine Quadricep Sets - 1 x daily - 7 x weekly - 3 sets - 10 reps  Active Straight Leg Raise with Quad Set - 1 x daily - 7 x weekly - 3 sets - 10 reps  Sidelying Hip Adduction - 1 x daily - 7 x weekly - 3 sets - 10 reps  Sidelying Hip Abduction - 1 x daily - 7 x weekly - 3 sets - 10 reps  Prone Hip Extension  - 1 x daily - 7 x weekly - 3 sets - 10 reps  Standing Weight Shift Side to Side - 1 x daily - 7 x weekly - 3 sets - 10 reps      Total Timed Treatment:     40   mins  Total Time of Visit:             45   mins         ASSESSMENT/PLAN     GOALS  Goals                               Progress Note due by 10/03/21                                                      Recert due by 12/2/2021   STG by: 3 Weeks Comments Date Status   Patient will demonstrate 90 deg Active L knee ROM 90 deg actively today  9/10/21 MET   Patient will demonstrate L knee active extension to neutral  Worked on this today along with quad strength utilizing NMES 9/17 ongoing   Patient will demonstrate decreased L knee edema  Applied KT tape today for swellng  9/13/21 ongoing   LTG by: 6 Weeks      Patient will demonstrate independence with comprehensive HEP No questions regarding HEP 9/13/21 ongoing   Patient will demonstrate >125 deg active L knee flexion    ongoing   Patient will ambulate >250 ft with normal gait pattern    ongoing   Patient will perform ascending/descending stairs reciprocally utilizing handrail as needed   Pt reports improved ability to navigate stairs at home now, but is still limited  9/15/21 ongoing     Assessment/Plan     ASSESSMENT: We utilized [place] stimulation this date to increase quad contraction. She has a slight extensor lag with SLR and must remain in the brace until she can perform consecutive reps without a lag indicating strong quad strength/control. She also inquired about getting down and up from the floor so that she can do her exercises, we addressed this using the brace to get into position and donning back on to get up.    PLAN: Continue to work on quad and hip strength along with addressing gait mechanics.    Signature: Edmundo Trejo, PT DPT, DPT

## 2021-09-20 ENCOUNTER — LAB (OUTPATIENT)
Dept: LAB | Facility: HOSPITAL | Age: 56
End: 2021-09-20

## 2021-09-20 ENCOUNTER — TELEPHONE (OUTPATIENT)
Dept: PHYSICAL THERAPY | Facility: CLINIC | Age: 56
End: 2021-09-20

## 2021-09-20 ENCOUNTER — TELEPHONE (OUTPATIENT)
Dept: FAMILY MEDICINE CLINIC | Facility: CLINIC | Age: 56
End: 2021-09-20

## 2021-09-20 ENCOUNTER — OFFICE VISIT (OUTPATIENT)
Dept: FAMILY MEDICINE CLINIC | Facility: CLINIC | Age: 56
End: 2021-09-20

## 2021-09-20 VITALS
HEIGHT: 66 IN | BODY MASS INDEX: 37.93 KG/M2 | HEART RATE: 95 BPM | DIASTOLIC BLOOD PRESSURE: 72 MMHG | RESPIRATION RATE: 20 BRPM | WEIGHT: 236 LBS | OXYGEN SATURATION: 96 % | TEMPERATURE: 97 F | SYSTOLIC BLOOD PRESSURE: 143 MMHG

## 2021-09-20 DIAGNOSIS — Z20.822 SUSPECTED COVID-19 VIRUS INFECTION: Primary | ICD-10-CM

## 2021-09-20 DIAGNOSIS — J20.9 ACUTE BRONCHITIS, UNSPECIFIED ORGANISM: ICD-10-CM

## 2021-09-20 LAB — SARS-COV-2 RNA PNL SPEC NAA+PROBE: NOT DETECTED

## 2021-09-20 PROCEDURE — 96372 THER/PROPH/DIAG INJ SC/IM: CPT | Performed by: NURSE PRACTITIONER

## 2021-09-20 PROCEDURE — 99213 OFFICE O/P EST LOW 20 MIN: CPT | Performed by: NURSE PRACTITIONER

## 2021-09-20 PROCEDURE — C9803 HOPD COVID-19 SPEC COLLECT: HCPCS | Performed by: NURSE PRACTITIONER

## 2021-09-20 PROCEDURE — 87635 SARS-COV-2 COVID-19 AMP PRB: CPT | Performed by: NURSE PRACTITIONER

## 2021-09-20 RX ORDER — DEXAMETHASONE SODIUM PHOSPHATE 4 MG/ML
8 INJECTION, SOLUTION INTRA-ARTICULAR; INTRALESIONAL; INTRAMUSCULAR; INTRAVENOUS; SOFT TISSUE ONCE
Status: COMPLETED | OUTPATIENT
Start: 2021-09-20 | End: 2021-09-20

## 2021-09-20 RX ORDER — CLARITHROMYCIN 500 MG/1
500 TABLET, COATED ORAL 2 TIMES DAILY
Qty: 20 TABLET | Refills: 0 | Status: SHIPPED | OUTPATIENT
Start: 2021-09-20 | End: 2021-11-05

## 2021-09-20 RX ORDER — CEFTRIAXONE 1 G/1
1 INJECTION, POWDER, FOR SOLUTION INTRAMUSCULAR; INTRAVENOUS EVERY 24 HOURS
Status: COMPLETED | OUTPATIENT
Start: 2021-09-20 | End: 2021-09-20

## 2021-09-20 RX ORDER — METHYLPREDNISOLONE 4 MG/1
TABLET ORAL
Qty: 21 TABLET | Refills: 0 | Status: SHIPPED | OUTPATIENT
Start: 2021-09-20 | End: 2021-11-05

## 2021-09-20 RX ADMIN — DEXAMETHASONE SODIUM PHOSPHATE 8 MG: 4 INJECTION, SOLUTION INTRA-ARTICULAR; INTRALESIONAL; INTRAMUSCULAR; INTRAVENOUS; SOFT TISSUE at 13:37

## 2021-09-20 RX ADMIN — CEFTRIAXONE 1 G: 1 INJECTION, POWDER, FOR SOLUTION INTRAMUSCULAR; INTRAVENOUS at 13:34

## 2021-09-20 NOTE — PROGRESS NOTES
After obtaining consent, and per orders of NELLI Casillas, injection of rocpehin dorsogluteal R NDC:[address]-6 lot:[id number] EXP:11-1-22 given by Lori Baldwin LPN. Patient instructed to remain in clinic for 20 minutes afterwards, and to report any adverse reaction to me immediately. Pt tolerated well with no adverse reactions.     After obtaining consent, and per orders of NELLI Victoria, injection of dex8 dorsogluteal L NDC: 04089-179-47 lot: [id number] exp: 7-1-22 given by Lori Baldwin LPN. Patient instructed to remain in clinic for 20 minutes afterwards, and to report any adverse reaction to me immediately. Pt tolerated well with no adverse reactions.

## 2021-09-20 NOTE — PROGRESS NOTES
"Chief Complaint  Cough, URI, and Sore Throat    Subjective    History of Present Illness      Patient presents to [facility] for   Pt c/o a sore throat, coughing and chest/nasal  Congestion x 3 days. Pt also c/o left sided rib pain. Pt states that she had a negative COVID test 30 minutes ago.    Cough  This is a recurrent problem. The current episode started in the past 7 days. The problem has been gradually worsening. The cough is productive of sputum. Associated symptoms include nasal congestion. Treatments tried: mucinex. The treatment provided no relief.   URI   This is a recurrent problem. The current episode started in the past 7 days. The problem has been gradually worsening. Associated symptoms include congestion, coughing and sinus pain. Treatments tried: mucinex. The treatment provided no relief.        Review of Systems   HENT: Positive for congestion.    Respiratory: Positive for cough.        I have reviewed and agree with the HPI and ROS information as above.  Annmarie Martines, NELLI     Objective   Vital Signs:   /72   Pulse 95   Temp 97 °F (36.1 °C)   Resp 20   Ht 167.6 cm (66\")   Wt 107 kg (236 lb)   SpO2 96%   BMI 38.09 kg/m²       Physical Exam  Constitutional:       Appearance: Normal appearance. She is well-developed.   HENT:      Head: Normocephalic and atraumatic.      Right Ear: External ear normal.      Left Ear: External ear normal.      Nose: Nose normal. No nasal tenderness or congestion.      Mouth/Throat:      Lips: Pink. No lesions.      Mouth: Mucous membranes are moist. No oral lesions.      Dentition: Normal dentition.      Pharynx: Oropharynx is clear. No pharyngeal swelling, oropharyngeal exudate or posterior oropharyngeal erythema.   Eyes:      General: Lids are normal. Vision grossly intact. No scleral icterus.        Right eye: No discharge.         Left eye: No discharge.      Extraocular Movements: Extraocular movements " intact.      Conjunctiva/sclera: Conjunctivae normal.      Right eye: Right conjunctiva is not injected.      Left eye: Left conjunctiva is not injected.      Pupils: Pupils are equal, round, and reactive to light.   Cardiovascular:      Rate and Rhythm: Normal rate and regular rhythm.      Heart sounds: Normal heart sounds. No murmur heard.   No gallop.    Pulmonary:      Effort: Pulmonary effort is normal.      Breath sounds: Normal breath sounds and air entry. No wheezing, rhonchi or rales.   Musculoskeletal:         General: No tenderness or deformity. Normal range of motion.      Cervical back: Full passive range of motion without pain, normal range of motion and neck supple.      Right lower leg: No edema.      Left lower leg: No edema.   Skin:     General: Skin is warm and dry.      Coloration: Skin is not jaundiced.      Findings: No rash.   Neurological:      Mental Status: She is alert and oriented to person, place, and time.      Cranial Nerves: Cranial nerves are intact.      Sensory: Sensation is intact.      Motor: Motor function is intact.      Coordination: Coordination is intact.      Gait: Gait is intact.   Psychiatric:         Attention and Perception: Attention normal.         Mood and Affect: Mood and affect normal.         Behavior: Behavior is not hyperactive. Behavior is cooperative.         Thought Content: Thought content normal.         Judgment: Judgment normal.          Result Review  Data Reviewed:                   Assessment and Plan      Problem List Items Addressed This Visit     None      Visit Diagnoses     Suspected COVID-19 virus infection    -  Primary    Relevant Orders    COVID PRE-OP / PRE-PROCEDURE SCREENING ORDER (NO ISOLATION) - Swab, Nasal Cavity (Completed)    Acute bronchitis, unspecified organism        Relevant Medications    clarithromycin (BIAXIN) 500 MG tablet    methylPREDNISolone (MEDROL) 4 MG dose pack    cefTRIAXone (ROCEPHIN) injection 1 g    dexamethasone  (DECADRON) injection 8 mg    HYDROcod Polst-CPM Polst ER (Tussionex Pennkinetic ER) 10-8 MG/5ML ER suspension      Patient comes in today for a sick visit.  Her symptoms started on Friday.  She denies any known fever.  She has been tested for Covid [place]-door this morning and it was negative.  She states that she feels like she has bronchitis.  Nothing abnormal on exam.  Treat as above.  Will hold off on the Medrol pack if patient does not need it.  Return with continuing or worsening symptoms.    Patient did request Tussionex from Dr. Caldwell.  [place]son was clean.  Dr. Caldwell sent that into the pharmacy.        Follow Up   Return if symptoms worsen or fail to improve.  Patient was given instructions and counseling regarding her condition or for health maintenance advice. Please see specific information pulled into the AVS if appropriate.

## 2021-09-20 NOTE — TELEPHONE ENCOUNTER
Caller: Miranda Elliott    Relationship to patient: Self    Best call back number: [phone]          PATIENT ALREADY KNOWS SHE DOES NOT HAVE COVID.

## 2021-09-22 ENCOUNTER — TREATMENT (OUTPATIENT)
Dept: PHYSICAL THERAPY | Facility: CLINIC | Age: 56
End: 2021-09-22

## 2021-09-22 DIAGNOSIS — Z98.890 STATUS POST MEDIAL MENISCUS REPAIR: ICD-10-CM

## 2021-09-22 DIAGNOSIS — R26.9 GAIT DISTURBANCE: Primary | ICD-10-CM

## 2021-09-22 PROCEDURE — 97110 THERAPEUTIC EXERCISES: CPT

## 2021-09-22 NOTE — PROGRESS NOTES
Physical Therapy Treatment Note    Patient: Miranda Elliott                                                                                     Visit Date: 2021  :     1965    Referring practitioner:    Kevin Tovar MD  Date of Initial Visit:          Type: THERAPY  Noted: 9/3/2021    Patient seen for 7 sessions    Visit Diagnoses:    ICD-10-CM ICD-9-CM   1. Gait disturbance  R26.9 781.2   2. Status post medial meniscus repair  Z98.890 V45.89     SUBJECTIVE     Subjective She arrived to session not wearing brace as it continues to slip down after donning. Increased pain inferior L knee following last session, improved now.     PAIN: 1/10     OBJECTIVE     Objective      Therapeutic Exercises    83324 Comments   Quad set with heel prop  2 x 10 with 5 sec hold    Heel slides with quad set at end range of 45 cm SB 2 x 10    Prone knee flexion to 90 deg, cue for eccentric extension to lower  2 x 10    4 way hip SLR 2  X 10 each   KT taping for L knee swelling                 Timed Minutes 40         Therapy Education/Self Care 15773   Details: Mechanics with getting up and down out of the floor with brace   Given Home Exercise Program  [employer] [facility] (access code [email or web address])   Progress: Reinforced   Education provided to:  Patient   Level of understanding Verbalized, Demonstrated and Teach back level of understanding   Timed Minutes      Access Code: [email or web address]  Date: 09/10/2021  Prepared by: Jenny Yousif    Exercises  Supine Quadricep Sets - 1 x daily - 7 x weekly - 3 sets - 10 reps  Active Straight Leg Raise with Quad Set - 1 x daily - 7 x weekly - 3 sets - 10 reps  Sidelying Hip Adduction - 1 x daily - 7 x weekly - 3 sets - 10 reps  Sidelying Hip Abduction - 1 x daily - 7 x weekly - 3 sets - 10 reps  Prone Hip Extension - 1 x daily - 7 x weekly - 3 sets - 10 reps  Standing Weight Shift Side to Side - 1 x daily - 7 x weekly - 3 sets - 10 reps      Total Timed Treatment:     40   mins  Total Time of Visit:              40   mins         ASSESSMENT/PLAN     GOALS  Goals                               Progress Note due by 10/03/21                                                      Recert due by 12/2/2021   STG by: 3 Weeks Comments Date Status   Patient will demonstrate 90 deg Active L knee ROM 90 deg actively today  9/10/21 MET   Patient will demonstrate L knee active extension to neutral  Worked on this today along with quad strength utilizing NMES 9/17 ongoing   Patient will demonstrate decreased L knee edema  Applied KT tape today for swellng  9/13/21 ongoing   LTG by: 6 Weeks      Patient will demonstrate independence with comprehensive HEP No questions regarding HEP 9/13/21 ongoing   Patient will demonstrate >125 deg active L knee flexion    ongoing   Patient will ambulate >250 ft with normal gait pattern  Continues to walk with 1 crutch, however mechanics are improving as she is no longer limping  9/22/21 ongoing   Patient will perform ascending/descending stairs reciprocally utilizing handrail as needed   Pt reports improved ability to navigate stairs at home now, but is still limited  9/15/21 ongoing     Assessment/Plan     ASSESSMENT: Extensor lag still present with SLR and quad set, therefore recommending she continue to wear the brace. Explained that even if the brace does slide down, that will provide more support if the knee were to buckle compared to if not wearing the brace. She understood. Focused on stability exercises today, will plan to address gait mechanics next session.     PLAN: Address gait mechanics and quad control.    Signature: Ann Marie Ha, PT, DPT

## 2021-09-24 ENCOUNTER — TREATMENT (OUTPATIENT)
Dept: PHYSICAL THERAPY | Facility: CLINIC | Age: 56
End: 2021-09-24

## 2021-09-24 DIAGNOSIS — R26.9 GAIT DISTURBANCE: Primary | ICD-10-CM

## 2021-09-24 DIAGNOSIS — Z98.890 STATUS POST MEDIAL MENISCUS REPAIR: ICD-10-CM

## 2021-09-24 PROCEDURE — 97112 NEUROMUSCULAR REEDUCATION: CPT

## 2021-09-24 PROCEDURE — 97140 MANUAL THERAPY 1/> REGIONS: CPT

## 2021-09-24 NOTE — PROGRESS NOTES
Physical Therapy Treatment Note    Patient: Miranda Elliott                                                                                     Visit Date: 2021  :     1965    Referring practitioner:    Kevin Tovar MD  Date of Initial Visit:          Type: THERAPY  Noted: 9/3/2021    Patient seen for 8 sessions    Visit Diagnoses:    ICD-10-CM ICD-9-CM   1. Gait disturbance  R26.9 781.2   2. Status post medial meniscus repair  Z98.890 V45.89     SUBJECTIVE     Subjective Mildly sore after previous session, no complaints otherwise. She is still struggling to keep the knee brace in place.     PAIN: 1/10     OBJECTIVE     Objective      Therapeutic Exercises    36460 Comments   Heel slides L knee                                Timed Minutes 2     Manual Therapy     03627  Comments   L patellar mobilizations in all planes of motion     STM w massage cream medial knee, guarding noted                Timed Minutes 25        Neuromuscular Reeducation     99893 Comments   Citizen of [place] Electrical stimulation with quad sets Max amp 12         Timed Minutes 15         Therapy Education/Self Care 99997   Details: Mechanics with getting up and down out of the floor with brace   [facility]  [facility] (access code [email or web address][facility]   Progress: Reinforced   Education provided to:  Patient   Level of understanding Verbalized, Demonstrated and Teach back level of understanding   Timed Minutes      Access Code: [email or web address]  Date: 09/10/2021  Prepared by: Jenny Yousif    Exercises  Supine Quadricep Sets - 1 x daily - 7 x weekly - 3 sets - 10 reps  Active Straight Leg Raise with Quad Set - 1 x daily - 7 x weekly - 3 sets - 10 reps  Sidelying Hip Adduction - 1 x daily - 7 x weekly - 3 sets - 10 reps  Sidelying Hip Abduction - 1 x daily - 7 x weekly - 3 sets - 10 reps  Prone Hip Extension - 1 x daily - 7 x weekly - 3 sets - 10 reps  Standing Weight Shift Side to Side - 1 x daily - 7 x weekly - 3 sets - 10  reps      Total Timed Treatment:     40   mins  Total Time of Visit:             42   mins         ASSESSMENT/PLAN     GOALS  Goals                               Progress Note due by 10/03/21                                                      Recert due by 12/2/2021   STG by: 3 Weeks Comments Date Status   Patient will demonstrate 90 deg Active L knee ROM 90 deg actively today  9/10/21 MET   Patient will demonstrate L knee active extension to neutral  Worked on this today along with quad strength utilizing NMES 9/17 ongoing   Patient will demonstrate decreased L knee edema  Applied KT tape today for swellng  9/13/21 ongoing   LTG by: 6 Weeks      Patient will demonstrate independence with comprehensive HEP No questions regarding HEP 9/13/21 ongoing   Patient will demonstrate >125 deg active L knee flexion  102 deg today   ongoing   Patient will ambulate >250 ft with normal gait pattern  Continues to walk with 1 crutch, however mechanics are improving as she is no longer limping  9/22/21 ongoing   Patient will perform ascending/descending stairs reciprocally utilizing handrail as needed   Pt reports improved ability to navigate stairs at home now, but is still limited  9/15/21 ongoing     Assessment/Plan     ASSESSMENT: Extensor lag still present with SLR and quad set, therefore recommending she continue to wear the brace. L patella hypomobile today with subsequent medial tenderness/stiffness. Patient responded well to mobilizations and STM. Utilized Romanian estim for assisted contraction during quad set, she performed well.      PLAN: Address gait mechanics and quad control.    Signature: Ann Marie Ha, PT, DPT

## 2021-09-27 ENCOUNTER — TREATMENT (OUTPATIENT)
Dept: PHYSICAL THERAPY | Facility: CLINIC | Age: 56
End: 2021-09-27

## 2021-09-27 DIAGNOSIS — Z98.890 STATUS POST MEDIAL MENISCUS REPAIR: ICD-10-CM

## 2021-09-27 DIAGNOSIS — R26.9 GAIT DISTURBANCE: Primary | ICD-10-CM

## 2021-09-27 PROCEDURE — 97140 MANUAL THERAPY 1/> REGIONS: CPT

## 2021-09-27 PROCEDURE — 97110 THERAPEUTIC EXERCISES: CPT

## 2021-09-27 NOTE — PROGRESS NOTES
Physical Therapy Treatment Note    Patient: Miranda Elliott                                                                                     Visit Date: 2021  :     1965    Referring practitioner:    Kevin Tovar MD  Date of Initial Visit:          Type: THERAPY  Noted: 9/3/2021    Patient seen for 9 sessions    Visit Diagnoses:    ICD-10-CM ICD-9-CM   1. Gait disturbance  R26.9 781.2   2. Status post medial meniscus repair  Z98.890 V45.89     SUBJECTIVE     Subjective Patient stated that yesterday her inferior L knee started hurting while walking. Following previous session, she had mild soreness and no complaints otherwise.      PAIN: 1/10     OBJECTIVE     Objective      Therapeutic Exercises    88577 Comments   L SLR 2 X 15, cue for quad set    Prone L knee flexion 2 x 10    Prone L knee extension  2 x 10    Step ups leading with LLE X 10, 1 handrail                    Timed Minutes 15     Manual Therapy     22833  Comments   L patellar mobilizations in all planes of motion     STM w massage cream medial/inferior knee, guarding noted Tender inferiorly                Timed Minutes 25       Therapy Education/Self Care 88967   Details: Mechanics with getting up and down out of the floor with brace   Given Home Exercise Program  [employer] [facility] (access code [email or web address])   Progress: Reinforced   Education provided to:  Patient   Level of understanding Verbalized, Demonstrated and Teach back level of understanding   Timed Minutes      Access Code: [email or web address]  Date: 09/10/2021  Prepared by: Jenny Yousif    Exercises  Supine Quadricep Sets - 1 x daily - 7 x weekly - 3 sets - 10 reps  Active Straight Leg Raise with Quad Set - 1 x daily - 7 x weekly - 3 sets - 10 reps  Sidelying Hip Adduction - 1 x daily - 7 x weekly - 3 sets - 10 reps  Sidelying Hip Abduction - 1 x daily - 7 x weekly - 3 sets - 10 reps  Prone Hip Extension - 1 x daily - 7 x weekly - 3 sets - 10 reps  Standing Weight Shift Side to Side - 1 x  daily - 7 x weekly - 3 sets - 10 reps      Total Timed Treatment:     40   mins  Total Time of Visit:             40   mins         ASSESSMENT/PLAN     GOALS  Goals                               Progress Note due by 10/03/21                                                      Recert due by 12/2/2021   STG by: 3 Weeks Comments Date Status   Patient will demonstrate 90 deg Active L knee ROM 90 deg actively today  9/10/21 MET   Patient will demonstrate L knee active extension to neutral  Worked on this today along with quad strength utilizing NMES 9/17 ongoing   Patient will demonstrate decreased L knee edema  Applied KT tape today for swellng  9/13/21 ongoing   LTG by: 6 Weeks      Patient will demonstrate independence with comprehensive HEP No questions regarding HEP 9/13/21 ongoing   Patient will demonstrate >125 deg active L knee flexion  106 deg today  9/27/21 ongoing   Patient will ambulate >250 ft with normal gait pattern  Continues to walk with 1 crutch, however mechanics are improving as she is no longer limping  9/22/21 ongoing   Patient will perform ascending/descending stairs reciprocally utilizing handrail as needed   Pt reports improved ability to navigate stairs at home now, but is still limited  9/15/21 ongoing     Assessment/Plan     ASSESSMENT: Mild swelling and L inferior knee tenderness noted, did improve with STM. Encouraged her to ice and elevate the knee tonight as I think she did too much over the weekend leading to a mild flare up. Knee flexion ROM was improved today.       PLAN: Assess goals for 10 visit progress note next session. Consider NMES for L quad activation.     Signature: Ann Marie Ha, PT, DPT

## 2021-09-29 ENCOUNTER — TREATMENT (OUTPATIENT)
Dept: PHYSICAL THERAPY | Facility: CLINIC | Age: 56
End: 2021-09-29

## 2021-09-29 DIAGNOSIS — R26.9 GAIT DISTURBANCE: Primary | ICD-10-CM

## 2021-09-29 DIAGNOSIS — Z98.890 STATUS POST MEDIAL MENISCUS REPAIR: ICD-10-CM

## 2021-09-29 PROCEDURE — 97110 THERAPEUTIC EXERCISES: CPT

## 2021-09-29 PROCEDURE — 97140 MANUAL THERAPY 1/> REGIONS: CPT

## 2021-09-29 NOTE — PROGRESS NOTES
Physical Therapy Treatment Note and 10 Visit Progress Note    Patient: Miranda Elliott                                                                                     Visit Date: 2021  :     1965    Referring practitioner:    Kevin Tovar MD  Date of Initial Visit:          Type: THERAPY  Noted: 9/3/2021    Patient seen for 10 sessions    Visit Diagnoses:    ICD-10-CM ICD-9-CM   1. Gait disturbance  R26.9 781.2   2. Status post medial meniscus repair  Z98.890 V45.89     SUBJECTIVE     Subjective Patient stated that yesterday her inferior L knee started hurting while walking. Following previous session, she had mild soreness and no complaints otherwise.      PAIN: 1/10     OBJECTIVE     Objective      Therapeutic Exercises    [id number] Comments   L SLR 2 X 15   Heel slides     Step ups leading with LLE X 10 on 3 in step, 1 handrail   X 10 on 6 in step, 1 handrail    Shuttle press, 3 cords X 10 BLE  X 10 LLE only    1 mile on unicam bike, seat at 4  ~5 min            Timed Minutes 25     Manual Therapy     [id number]  Comments   STM w massage cream medial/inferior knee, guarding noted Tender inferiorly                Timed Minutes 15       Therapy Education/Self Care [id number]   Details: Added step ups to HEP   Given Home Exercise Program  [employer] [facility] (access code [email or web address])   Progress: Reinforced   Education provided to:  Patient   Level of understanding Verbalized, Demonstrated and Teach back level of understanding   Timed Minutes      Access Code: [email or web address]  Date: 09/10/2021  Prepared by: Jenny Yousif    Exercises  Supine Quadricep Sets - 1 x daily - 7 x weekly - 3 sets - 10 reps  Active Straight Leg Raise with Quad Set - 1 x daily - 7 x weekly - 3 sets - 10 reps  Sidelying Hip Adduction - 1 x daily - 7 x weekly - 3 sets - 10 reps  Sidelying Hip Abduction - 1 x daily - 7 x weekly - 3 sets - 10 reps  Prone Hip Extension - 1 x daily - 7 x weekly - 3 sets - 10 reps  Standing Weight Shift Side to Side - 1 x daily  - 7 x weekly - 3 sets - 10 reps      Total Timed Treatment:     40   mins  Total Time of Visit:             40   mins         ASSESSMENT/PLAN     GOALS  Goals                               Progress Note due by 10/29/21                                                      Recert due by 12/2/2021   STG by: 3 Weeks Comments Date Status   Patient will demonstrate 90 deg Active L knee ROM 90 deg actively today  9/10/21 MET   Patient will demonstrate L knee active extension to neutral  0 deg today  9/29/21 MET   Patient will demonstrate decreased L knee edema  No swelling noted today  9/29/21 MET    LTG by: 6 Weeks      Patient will demonstrate independence with comprehensive HEP Has been compliant with HEP and progressing appropriately 9/29/21 ongoing   Patient will demonstrate >125 deg active L knee flexion  115 deg today  9/29/21 ongoing   Patient will ambulate >250 ft with normal gait pattern  Able to ambulate without AD, however decreased L stance time noted 9/29/21 ongoing   Patient will perform ascending/descending stairs reciprocally utilizing handrail as needed   Prefers to perform stairs with step to pattern. Starting to work on step ups leading with the LLE.  9/29/21 ongoing     Assessment & Plan     Assessment  Impairments: abnormal coordination, abnormal gait, abnormal muscle firing, abnormal or restricted ROM, impaired balance, impaired physical strength, lacks appropriate home exercise program, pain with function and weight-bearing intolerance  Functional Limitations: walking, uncomfortable because of pain, standing and stooping  Plan  Therapy options: will be seen for skilled physical therapy services  Planned modality interventions: low level laser therapy, dry needling, high voltage pulsed current (pain management) and cryotherapy  Planned therapy interventions: manual therapy, balance/weight-bearing training, neuromuscular re-education, orthotic fitting/training, postural training, soft tissue  mobilization, spinal/joint mobilization, strengthening, stretching, therapeutic activities, home exercise program, gait training, functional ROM exercises and flexibility  Frequency: 2x week  Duration in visits: 8  Duration in weeks: 4  Treatment plan discussed with: patient         ASSESSMENT: Miranda has met all of the her 3 short term goals and has made progress toward reaching her 4 long term goals. She has done very well with therapy thus far. Primary limitations are L knee ROM, LLE strength and full weight bearing over the LLE. Her presentation is consistent given her surgical history of the meniscus root repair. Her L quad strength has greatly improved and she is now stable enough ambulate without the brace.Today she was able to progress to riding the upright stationary bike and perform gentle resistance training. She responds well to STM at end of session to assist in decreasing muscle soreness and swelling. She will benefit from skilled therapy services     PLAN: Continue to gently load the LLE to promote strengthening and stability during functional activities.     Signature: Ann Marie Ha, PT, DPT

## 2021-10-01 ENCOUNTER — TREATMENT (OUTPATIENT)
Dept: PHYSICAL THERAPY | Facility: CLINIC | Age: 56
End: 2021-10-01

## 2021-10-01 DIAGNOSIS — R26.9 GAIT DISTURBANCE: Primary | ICD-10-CM

## 2021-10-01 DIAGNOSIS — Z98.890 STATUS POST MEDIAL MENISCUS REPAIR: ICD-10-CM

## 2021-10-01 PROCEDURE — 97110 THERAPEUTIC EXERCISES: CPT

## 2021-10-01 PROCEDURE — 97140 MANUAL THERAPY 1/> REGIONS: CPT

## 2021-10-01 NOTE — PROGRESS NOTES
"Physical Therapy Treatment Note    Patient: Miranda Elliott                                                                                     Visit Date: 10/1/2021  :     1965    Referring practitioner:    Kevin Tovar MD  Date of Initial Visit:          Type: THERAPY  Noted: 9/3/2021    Patient seen for 11 sessions    Visit Diagnoses:    ICD-10-CM ICD-9-CM   1. Gait disturbance  R26.9 781.2   2. Status post medial meniscus repair  Z98.890 V45.89     SUBJECTIVE     Subjective Patient stated she tried to \"speed walk\" today, however felt mild increase in pain today.  No complaints following previous session.      PAIN: 1/10     OBJECTIVE     Objective     Therapeutic Exercises    [id number] Comments   Step ups leading with LLE X 10 on 3 in step, 1 handrail   2 x 5on 6 in step, 1 handrail     X 5 on 6 in step laterally  X 10 on 3 in step laterally    Shuttle press, 4 cords 2 X 10 LLE only    unicam bike, seat at 4, resistance 2   ~5 min                Timed Minutes 25      Manual Therapy     [id number]  Comments   STM w massage cream L hamstring complex  Prone    L knee KT taping for swelling                  Timed Minutes 15         TRIAL OF DRY NEEDLING:  Trial of dry needling performed by Edmundo Trejo PT, DPT on L hamstring around symptomatic points 8 needles.    Therapy Education/Self Care 66099   Details: Mechanics with getting up and down out of the floor with brace   Given Home Exercise Program  [employer] (access code [email or web address])   Progress: Reinforced   Education provided to:  Patient   Level of understanding Verbalized, Demonstrated and Teach back level of understanding   Timed Minutes      Access Code: [email or web address]  Date: 09/10/2021  Prepared by: Jenny Yousif    Exercises  Supine Quadricep Sets - 1 x daily - 7 x weekly - 3 sets - 10 reps  Active Straight Leg Raise with Quad Set - 1 x daily - 7 x weekly - 3 sets - 10 reps  Sidelying Hip Adduction - 1 x daily - 7 x weekly - 3 sets - 10 reps  Sidelying Hip " Abduction - 1 x daily - 7 x weekly - 3 sets - 10 reps  Prone Hip Extension - 1 x daily - 7 x weekly - 3 sets - 10 reps  Standing Weight Shift Side to Side - 1 x daily - 7 x weekly - 3 sets - 10 reps      Total Timed Treatment:     40   mins  Total Time of Visit:             40   mins         ASSESSMENT/PLAN     GOALS  Goals                               Progress Note due by 10/29/21                                                      Recert due by 12/2/2021   STG by: 3 Weeks Comments Date Status   Patient will demonstrate 90 deg Active L knee ROM 90 deg actively today  9/10/21 MET   Patient will demonstrate L knee active extension to neutral  0 deg today  9/29/21 MET   Patient will demonstrate decreased L knee edema  No swelling noted today  9/29/21 MET    LTG by: 6 Weeks         Patient will demonstrate independence with comprehensive HEP Has been compliant with HEP and progressing appropriately 9/29/21 ongoing   Patient will demonstrate >125 deg active L knee flexion  115 deg today  9/29/21 ongoing   Patient will ambulate >250 ft with normal gait pattern  Able to ambulate without AD, however decreased L stance time noted 9/29/21 ongoing   Patient will perform ascending/descending stairs reciprocally utilizing handrail as needed   Prefers to perform stairs with step to pattern. Starting to work on step ups leading with the LLE.  9/29/21 ongoing       Assessment/Plan     ASSESSMENT: Applied KT tape for mild swelling. Most difficulty with step ups leading with LLE due to quadriceps weakness. No knee buckling noted. LLE strength is improving as she tolerated increased resistance with exercises today.     PLAN: Continue strengthening for improved performance with functional activities.      Signature: Ann Marie Ha, PT, DPT

## 2021-10-04 ENCOUNTER — TREATMENT (OUTPATIENT)
Dept: PHYSICAL THERAPY | Facility: CLINIC | Age: 56
End: 2021-10-04

## 2021-10-04 DIAGNOSIS — R26.9 GAIT DISTURBANCE: Primary | ICD-10-CM

## 2021-10-04 DIAGNOSIS — Z98.890 STATUS POST MEDIAL MENISCUS REPAIR: ICD-10-CM

## 2021-10-04 PROCEDURE — 97110 THERAPEUTIC EXERCISES: CPT

## 2021-10-04 PROCEDURE — 97014 ELECTRIC STIMULATION THERAPY: CPT

## 2021-10-04 PROCEDURE — 97140 MANUAL THERAPY 1/> REGIONS: CPT

## 2021-10-04 NOTE — PROGRESS NOTES
"Physical Therapy Treatment Note    Patient: Miranda Elliott                                                                                     Visit Date: 10/4/2021  :     1965    Referring practitioner:    Kevin Tovar MD  Date of Initial Visit:          Type: THERAPY  Noted: 9/3/2021    Patient seen for 12 sessions    Visit Diagnoses:    ICD-10-CM ICD-9-CM   1. Gait disturbance  R26.9 781.2   2. Status post medial meniscus repair  Z98.890 V45.89     SUBJECTIVE     Subjective She reports being on her feet majority of the day at work, therefore she has had frequent \"sharp, tingly\" pains in her medial inferior L knee.     PAIN: 1/10     OBJECTIVE     Objective     Therapeutic Exercises    [id number] Comments   unicam bike, seat at 4, no resistance  5 min     L knee flexion/extension actively            Timed Minutes 10      Manual Therapy     [id number]  Comments   STM w massage cream L medial knee  supine   L patella mobs Medial/lateral/superior/inferior                Timed Minutes 25     Modalities Comments   Cold laser/estim combo, deep chromic inflammation mode Medial L knee       Minutes 10       Therapy Education/Self Care [id number]   Details: Recommended ice and elevation tonight.    Given Home Exercise Program  [employer] [facility] (access code [email or web address])   Progress: Reinforced   Education provided to:  Patient   Level of understanding Verbalized, Demonstrated and Teach back level of understanding   Timed Minutes      Access Code: [email or web address]  Date: 09/10/2021  Prepared by: Jenny Yousif    Exercises  Supine Quadricep Sets - 1 x daily - 7 x weekly - 3 sets - 10 reps  Active Straight Leg Raise with Quad Set - 1 x daily - 7 x weekly - 3 sets - 10 reps  Sidelying Hip Adduction - 1 x daily - 7 x weekly - 3 sets - 10 reps  Sidelying Hip Abduction - 1 x daily - 7 x weekly - 3 sets - 10 reps  Prone Hip Extension - 1 x daily - 7 x weekly - 3 sets - 10 reps  Standing Weight Shift Side to Side - 1 x daily - 7 x weekly - 3 sets - 10 " reps      Total Timed Treatment:     35   mins  Total Time of Visit:             45   mins         ASSESSMENT/PLAN     GOALS  Goals                               Progress Note due by 10/29/21                                                      Recert due by 12/2/2021   STG by: 3 Weeks Comments Date Status   Patient will demonstrate 90 deg Active L knee ROM 90 deg actively today  9/10/21 MET   Patient will demonstrate L knee active extension to neutral  0 deg today  9/29/21 MET   Patient will demonstrate decreased L knee edema  No swelling noted today  9/29/21 MET    LTG by: 6 Weeks         Patient will demonstrate independence with comprehensive HEP Has been compliant with HEP and progressing appropriately 9/29/21 ongoing   Patient will demonstrate >125 deg active L knee flexion  117 deg today  10/4/21 ongoing   Patient will ambulate >250 ft with normal gait pattern  Able to ambulate without AD, however decreased L stance time noted 9/29/21 ongoing   Patient will perform ascending/descending stairs reciprocally utilizing handrail as needed   Prefers to perform stairs with step to pattern. Starting to work on step ups leading with the LLE.  9/29/21 ongoing       Assessment/Plan     ASSESSMENT: Focused on muscle guarding and L knee mobility today as she was weight bearing majority of her day. Medial joint capsule and hamstring continue to be guarded, however she does respond well to STM. No notable difference with DN previous session. Explained that as she starts to feel pain in her knee when standing/walking, she would benefit from resting the knee as that is a sign she is over stressing the joint. Even with the soreness today, her L knee ROM improved. She has follow up with surgeon on 10/6/21.    PLAN: Continue strengthening for improved performance with functional activities. Utilize STM as need for muscle guarding.     Signature: Ann Marie Ha, PT, DPT

## 2021-10-08 ENCOUNTER — TREATMENT (OUTPATIENT)
Dept: PHYSICAL THERAPY | Facility: CLINIC | Age: 56
End: 2021-10-08

## 2021-10-08 DIAGNOSIS — Z98.890 STATUS POST MEDIAL MENISCUS REPAIR: ICD-10-CM

## 2021-10-08 DIAGNOSIS — R26.9 GAIT DISTURBANCE: Primary | ICD-10-CM

## 2021-10-08 PROCEDURE — 97032 APPL MODALITY 1+ESTIM EA 15: CPT | Performed by: PHYSICAL THERAPIST

## 2021-10-08 PROCEDURE — 97110 THERAPEUTIC EXERCISES: CPT | Performed by: PHYSICAL THERAPIST

## 2021-10-08 PROCEDURE — 97112 NEUROMUSCULAR REEDUCATION: CPT | Performed by: PHYSICAL THERAPIST

## 2021-10-08 NOTE — PROGRESS NOTES
Physical Therapy Treatment Note    Patient: Miranda Elliott                                                                                     Visit Date: 10/8/2021  :     1965    Referring practitioner:    Kevin Tovar MD  Date of Initial Visit:          Type: THERAPY  Noted: 9/3/2021    Patient seen for 13 sessions    Visit Diagnoses:    ICD-10-CM ICD-9-CM   1. Gait disturbance  R26.9 781.2   2. Status post medial meniscus repair  Z98.890 V45.89     SUBJECTIVE     Subjective She didn't work today and will have some days off for her birthday. She is still having a pain every once in a while randomly. She was supposed to see her doctor on Wednesday but got too sick from the flu shot and had to reschedule.     PAIN: 0/10     OBJECTIVE     Objective    Neuromuscular Reeducation     [id number] Comments   Neurocom Assessment: Limits of Stability    Neurocom Sequence Training: Weight Shifting (L) level 1            Timed Minutes 15     Therapeutic Exercises    79921 Comments   L LE step ups onto 4 in step  1x10   L LE lateral step ups onto 4 in step 1x10   L LE eccentric step downs from 4 in step 1x10   L LE eccentric shuttle press (4 lbs)  1x10   L eccentric LAQs 1x10       Timed Minutes 15      Manual Therapy     [id number]  Comments   IASTM w/ vibrating homedics to L quads              Timed Minutes 5     Modalities Comments   Cold laser/estim combo, deep chromic inflammation mode Medial L knee       Minutes 10       Therapy Education/Self Care 94710   Details:    Given Home Exercise Program  [employer] [facility] (access code [email or web address])   Progress: Reinforced   Education provided to:  Patient   Level of understanding Verbalized, Demonstrated and Teach back level of understanding   Timed Minutes      Access Code: [email or web address]  Date: 09/10/2021  Prepared by: Jenny Yousif    Exercises  Supine Quadricep Sets - 1 x daily - 7 x weekly - 3 sets - 10 reps  Active Straight Leg Raise with Quad Set - 1 x daily - 7 x weekly - 3 sets - 10  reps  Sidelying Hip Adduction - 1 x daily - 7 x weekly - 3 sets - 10 reps  Sidelying Hip Abduction - 1 x daily - 7 x weekly - 3 sets - 10 reps  Prone Hip Extension - 1 x daily - 7 x weekly - 3 sets - 10 reps  Standing Weight Shift Side to Side - 1 x daily - 7 x weekly - 3 sets - 10 reps      Total Timed Treatment:     40   mins  Total Time of Visit:             45   mins         ASSESSMENT/PLAN     GOALS  Goals                               Progress Note due by 10/29/21                                                      Recert due by 12/2/2021   STG by: 3 Weeks Comments Date Status   Patient will demonstrate 90 deg Active L knee ROM 90 deg actively today  9/10/21 MET   Patient will demonstrate L knee active extension to neutral  0 deg today  9/29/21 MET   Patient will demonstrate decreased L knee edema  No swelling noted today  9/29/21 MET    LTG by: 6 Weeks         Patient will demonstrate independence with comprehensive HEP Has been compliant with HEP and progressing appropriately 9/29/21 ongoing   Patient will demonstrate >125 deg active L knee flexion  117 deg today  10/4/21 ongoing   Patient will ambulate >250 ft with normal gait pattern  Able to ambulate without AD, however decreased L stance time noted 9/29/21 ongoing   Patient will perform ascending/descending stairs reciprocally utilizing handrail as needed   Prefers to perform stairs with step to pattern. Starting to work on step ups leading with the LLE.  9/29/21 ongoing       Assessment/Plan     ASSESSMENT: She did well on the Neurocom and reported the L WSing actually felt easier than R. She struggled posteriorly but did so bilaterally due to fear of falling backwards. She did struggle w/ eccentric step downs and required increased UE support on table. She denied pain but did report difficulty.     PLAN: Assess response to today's session and progress as symptoms allow.     Signature: Roseann Garner, PTA

## 2021-10-11 ENCOUNTER — TREATMENT (OUTPATIENT)
Dept: PHYSICAL THERAPY | Facility: CLINIC | Age: 56
End: 2021-10-11

## 2021-10-11 DIAGNOSIS — R26.9 GAIT DISTURBANCE: ICD-10-CM

## 2021-10-11 DIAGNOSIS — Z98.890 STATUS POST MEDIAL MENISCUS REPAIR: Primary | ICD-10-CM

## 2021-10-11 PROCEDURE — 97110 THERAPEUTIC EXERCISES: CPT | Performed by: PHYSICAL THERAPIST

## 2021-10-11 PROCEDURE — 97140 MANUAL THERAPY 1/> REGIONS: CPT | Performed by: PHYSICAL THERAPIST

## 2021-10-11 NOTE — PROGRESS NOTES
Physical Therapy Treatment Note    Patient: Miranda Elliott                                                                                     Visit Date: 10/11/2021  :     1965    Referring practitioner:    Kevin Tovar MD  Date of Initial Visit:          Type: THERAPY  Noted: 9/3/2021    Patient seen for 14 sessions    Visit Diagnoses:    ICD-10-CM ICD-9-CM   1. Status post medial meniscus repair  Z98.890 V45.89   2. Gait disturbance  R26.9 781.2     SUBJECTIVE     Subjective Her L knee has no pain today. She has been cleaning out today which has lead to some muscle tightness in her posterior.      PAIN: 0/10     OBJECTIVE     Objective      Therapeutic Exercises    62405 Comments   L LE step ups onto 6 in step with R handrail  2 x 10    L eccentric lateral lowering, 6 in step  3 sets x 5 reps    L LE eccentric shuttle press, 4 bands and 5 bands  2 x 10    L longsitting hamstring stretch  4 x 15 sec    L supine piriformis str 4 x 15 sec hold   L hip figure 4 stretch  4 x 15 sec    Timed Minutes 25      Manual Therapy     [id number]  Comments   STM L medial hamstring insertion point, trigger point release with massage cream  Prone              Timed Minutes 15       Therapy Education/Self Care 41575   Details:    Given Home Exercise Program  [employer] [facility] (access code [email or web address])   Progress: Reinforced   Education provided to:  Patient   Level of understanding Verbalized, Demonstrated and Teach back level of understanding   Timed Minutes      Access Code: [email or web address]  Date: 09/10/2021  Prepared by: Jenny Yousif    Exercises  Supine Quadricep Sets - 1 x daily - 7 x weekly - 3 sets - 10 reps  Active Straight Leg Raise with Quad Set - 1 x daily - 7 x weekly - 3 sets - 10 reps  Sidelying Hip Adduction - 1 x daily - 7 x weekly - 3 sets - 10 reps  Sidelying Hip Abduction - 1 x daily - 7 x weekly - 3 sets - 10 reps  Prone Hip Extension - 1 x daily - 7 x weekly - 3 sets - 10 reps  Standing Weight Shift Side to Side - 1 x  daily - 7 x weekly - 3 sets - 10 reps      Total Timed Treatment:     40   mins  Total Time of Visit:             40   mins         ASSESSMENT/PLAN     GOALS  Goals                               Progress Note due by 10/29/21                                                      Recert due by 12/2/2021   STG by: 3 Weeks Comments Date Status   Patient will demonstrate 90 deg Active L knee ROM 90 deg actively today  9/10/21 MET   Patient will demonstrate L knee active extension to neutral  0 deg today  9/29/21 MET   Patient will demonstrate decreased L knee edema  No swelling noted today  9/29/21 MET    LTG by: 6 Weeks         Patient will demonstrate independence with comprehensive HEP Added stretching to HEP 10/11/21 ongoing   Patient will demonstrate >125 deg active L knee flexion  117 deg today  10/4/21 ongoing   Patient will ambulate >250 ft with normal gait pattern  Able to ambulate without AD, however decreased L stance time noted 9/29/21 ongoing   Patient will perform ascending/descending stairs reciprocally utilizing handrail as needed   Prefers to perform stairs with step to pattern. Starting to work on step ups leading with the LLE.  9/29/21 ongoing       Assessment/Plan     ASSESSMENT: Notable medial hamstring trigger point at distal insertion, this is contributing to increased tightness and decreased mobility. L quad weakness during eccentric lowering, no buckling observed. Has a follow up with MD at end of the week.   Responded well to stretching at end of session, mild aching reported.     PLAN: Continue addressing hamstring tightness as needed. Progress LLE strengthening as able.      Signature: Ann Marie Ha, PT, DPT

## 2021-10-13 ENCOUNTER — TREATMENT (OUTPATIENT)
Dept: PHYSICAL THERAPY | Facility: CLINIC | Age: 56
End: 2021-10-13

## 2021-10-13 DIAGNOSIS — R26.9 GAIT DISTURBANCE: ICD-10-CM

## 2021-10-13 DIAGNOSIS — Z98.890 STATUS POST MEDIAL MENISCUS REPAIR: Primary | ICD-10-CM

## 2021-10-13 PROCEDURE — 97110 THERAPEUTIC EXERCISES: CPT | Performed by: PHYSICAL THERAPIST

## 2021-10-13 PROCEDURE — 97140 MANUAL THERAPY 1/> REGIONS: CPT | Performed by: PHYSICAL THERAPIST

## 2021-10-13 NOTE — PROGRESS NOTES
Physical Therapy Treatment Note    Patient: Miranda Elliott                                                                                     Visit Date: 10/13/2021  :     1965    Referring practitioner:    Kevin Tovar MD  Date of Initial Visit:          Type: THERAPY  Noted: 9/3/2021    Patient seen for 15 sessions    Visit Diagnoses:    ICD-10-CM ICD-9-CM   1. Status post medial meniscus repair  Z98.890 V45.89   2. Gait disturbance  R26.9 781.2     SUBJECTIVE     Subjective Patient reports increased L posterior knee tightness which is leading to some tenderness.       PAIN: 0/10     OBJECTIVE     Objective      Therapeutic Exercises    09883 Comments   Supine hamstring stretch with gait belt biasing the hamstring complex, medial, lateral     Unicam bike seat at 3, no resistance 6 min                   Timed Minutes 15      Manual Therapy     [id number]  Comments   STM L medial hamstring insertion point, trigger point release with massage cream  Prone    IASTM with edge tool L distal hamstring  Prone          Timed Minutes 30       Therapy Education/Self Care 16690   Details: Encouraged stretching at home and utilizing tennis ball for assisted STM    Given Home Exercise Program  [employer] [facility] (access code [email or web address])   Progress: Reinforced   Education provided to:  Patient   Level of understanding Verbalized, Demonstrated and Teach back level of understanding   Timed Minutes      Access Code: [email or web address]  Date: 09/10/2021  Prepared by: Jenny Yousif    Exercises  Supine Quadricep Sets - 1 x daily - 7 x weekly - 3 sets - 10 reps  Active Straight Leg Raise with Quad Set - 1 x daily - 7 x weekly - 3 sets - 10 reps  Sidelying Hip Adduction - 1 x daily - 7 x weekly - 3 sets - 10 reps  Sidelying Hip Abduction - 1 x daily - 7 x weekly - 3 sets - 10 reps  Prone Hip Extension - 1 x daily - 7 x weekly - 3 sets - 10 reps  Standing Weight Shift Side to Side - 1 x daily - 7 x weekly - 3 sets - 10 reps      Total Timed  Treatment:     45   mins  Total Time of Visit:             45   mins         ASSESSMENT/PLAN     GOALS  Goals                               Progress Note due by 10/29/21                                                      Recert due by 12/2/2021   STG by: 3 Weeks Comments Date Status   Patient will demonstrate 90 deg Active L knee ROM 90 deg actively today  9/10/21 MET   Patient will demonstrate L knee active extension to neutral  0 deg today  9/29/21 MET   Patient will demonstrate decreased L knee edema  No swelling noted today  9/29/21 MET    LTG by: 6 Weeks         Patient will demonstrate independence with comprehensive HEP Added stretching to HEP 10/11/21 ongoing   Patient will demonstrate >125 deg active L knee flexion  121 deg today  10/13/21 ongoing   Patient will ambulate >250 ft with normal gait pattern  Able to ambulate without AD, however decreased L stance time noted 9/29/21 ongoing   Patient will perform ascending/descending stairs reciprocally utilizing handrail as needed   Prefers to perform stairs with step to pattern. Starting to work on step ups leading with the LLE.  9/29/21 ongoing       Assessment/Plan     ASSESSMENT: Trigger point at medial distal L hamstring, did improve with STM and trigger point release. This is leading to tenderness with knee flexion, however ROM with flexion noted to improve this date.     PLAN: Continue addressing hamstring tightness as needed, consider utilizing TMR. Follow up regarding MD appointment.     Signature: Ann Marie Ha, PT, DPT

## 2021-10-18 ENCOUNTER — TREATMENT (OUTPATIENT)
Dept: PHYSICAL THERAPY | Facility: CLINIC | Age: 56
End: 2021-10-18

## 2021-10-18 DIAGNOSIS — Z98.890 STATUS POST MEDIAL MENISCUS REPAIR: Primary | ICD-10-CM

## 2021-10-18 DIAGNOSIS — R26.9 GAIT DISTURBANCE: ICD-10-CM

## 2021-10-18 PROCEDURE — 97110 THERAPEUTIC EXERCISES: CPT | Performed by: PHYSICAL THERAPIST

## 2021-10-18 PROCEDURE — 97112 NEUROMUSCULAR REEDUCATION: CPT | Performed by: PHYSICAL THERAPIST

## 2021-10-18 NOTE — PROGRESS NOTES
Physical Therapy Treatment Note    Patient: Miranda Elliott                                                                     Visit Date: 10/18/2021  :     1965    Referring practitioner:    Kevin Tovar MD  Date of Initial Visit:          Type: THERAPY  Noted: 9/3/2021    Patient seen for 16 sessions    Visit Diagnoses:    ICD-10-CM ICD-9-CM   1. Status post medial meniscus repair  Z98.890 V45.89   2. Gait disturbance  R26.9 781.2     SUBJECTIVE     Subjective She reports her  States he is pleased with her progress, #25 lb weight limit/lifting restriction.     PAIN: /10         OBJECTIVE     Objective      Neuromuscular Reeducation     73619 Comments   Limits of Stability and Rhythmic WS'ing testing on [employer]                    Timed Minutes 25      Therapeutic Exercises    67181 Comments   Unicam seat# 5 res 2 x 6 min     L LE step ups on BOSU blue side up x 15 with unilateral UE support    L LE LAQ's with eccentric focus on Naulitlus # 20 x 10            Timed Minutes 18       Therapy Education/Self Care 48864   Details:    Given Home Exercise Program   Progress: Reinforced   Education provided to:  Patient   Level of understanding Verbalized   Timed Minutes      Access Code: [email or web address]  Date: 09/10/2021  Prepared by: Jenny Yousif     Exercises  Supine Quadricep Sets - 1 x daily - 7 x weekly - 3 sets - 10 reps  Active Straight Leg Raise with Quad Set - 1 x daily - 7 x weekly - 3 sets - 10 reps  Sidelying Hip Adduction - 1 x daily - 7 x weekly - 3 sets - 10 reps  Sidelying Hip Abduction - 1 x daily - 7 x weekly - 3 sets - 10 reps  Prone Hip Extension - 1 x daily - 7 x weekly - 3 sets - 10 reps  Standing Weight Shift Side to Side - 1 x daily - 7 x weekly - 3 sets - 10 reps       Total Timed Treatment:     43   mins  Total Time of Visit:             43   mins         ASSESSMENT/PLAN     GOALS  Goals                               Progress  Note due by 10/29/21                                                      Recert due by 12/2/2021   STG by: 3 Weeks Comments Date Status   Patient will demonstrate 90 deg Active L knee ROM 90 deg actively today  9/10/21 MET   Patient will demonstrate L knee active extension to neutral  0 deg today  9/29/21 MET   Patient will demonstrate decreased L knee edema  No swelling noted today  9/29/21 MET    LTG by: 6 Weeks         Patient will demonstrate independence with comprehensive HEP Reinforced today 10/18/21 ongoing   Patient will demonstrate >125 deg active L knee flexion  121 deg today  10/13/21 ongoing   Patient will ambulate >250 ft with normal gait pattern  Able to ambulate without AD, however decreased L stance time noted 9/29/21 ongoing   Patient will perform ascending/descending stairs reciprocally utilizing handrail as needed   Prefers to perform stairs with step to pattern. Starting to work on step ups leading with the LLE.  9/29/21 ongoing         Assessment/Plan     ASSESSMENT: Her Limits of Stability testing results were much improved today and her reaction time R WS and L WS from midline was within .01 of a second difference.    PLAN: Begin to slowly introduce dynamic challenges such as walking with sudden turns and over the next week to light bounding on the Shuttle Press     Signature: Ramana Tinoco, PTA

## 2021-10-21 ENCOUNTER — TREATMENT (OUTPATIENT)
Dept: PHYSICAL THERAPY | Facility: CLINIC | Age: 56
End: 2021-10-21

## 2021-10-21 DIAGNOSIS — Z98.890 STATUS POST MEDIAL MENISCUS REPAIR: Primary | ICD-10-CM

## 2021-10-21 DIAGNOSIS — R26.9 GAIT DISTURBANCE: ICD-10-CM

## 2021-10-21 PROCEDURE — 97112 NEUROMUSCULAR REEDUCATION: CPT | Performed by: PHYSICAL THERAPIST

## 2021-10-21 PROCEDURE — 97110 THERAPEUTIC EXERCISES: CPT | Performed by: PHYSICAL THERAPIST

## 2021-10-21 PROCEDURE — 97140 MANUAL THERAPY 1/> REGIONS: CPT | Performed by: PHYSICAL THERAPIST

## 2021-10-21 NOTE — PROGRESS NOTES
"                                                                Physical Therapy Treatment Note    Patient: Miranda Elliott                                                                     Visit Date: 10/21/2021  :     1965    Referring practitioner:    Kevin Tovar MD  Date of Initial Visit:          Type: THERAPY  Noted: 9/3/2021    Patient seen for 17 sessions    Visit Diagnoses:    ICD-10-CM ICD-9-CM   1. Status post medial meniscus repair  Z98.890 V45.89   2. Gait disturbance  R26.9 781.2     SUBJECTIVE     Subjective She reports when she bends her L knee she feels a \"crunching\" in behind her knee     PAIN: 0/10         OBJECTIVE     Objective      Manual Therapy     68674  Comments   DFR L hamstring complex  prone   Assessed hamstring length (see assessment section)                Timed Minutes 15        Therapeutic Exercises    29431 Comments   L self hamstring stretches hip @ 90 and SLR    L LE LAQ's with eccentric focus on Naulitlus # 20 x 10                Timed Minutes 15     Neuromuscular Reeducation     47447 Comments   Suicide walks R turns only with cones short to long then long to short     walks R turns only with cones short to long then long to short                Timed Minutes 15       Therapy Education/Self Care 84685   Details:    Given Home Exercise Program  symptom relief   Progress: New   Education provided to:  Patient   Level of understanding Verbalized and Demonstrated   Timed Minutes      Access Code: [email or web address]  Date: 09/10/2021  Prepared by: Jenny Yousif     Exercises  Supine Quadricep Sets - 1 x daily - 7 x weekly - 3 sets - 10 reps  Active Straight Leg Raise with Quad Set - 1 x daily - 7 x weekly - 3 sets - 10 reps  Sidelying Hip Adduction - 1 x daily - 7 x weekly - 3 sets - 10 reps  Sidelying Hip Abduction - 1 x daily - 7 x weekly - 3 sets - 10 reps  Prone Hip Extension - 1 x daily - 7 x weekly - 3 sets - 10 reps  Standing Weight Shift Side to Side - 1 x daily " - 7 x weekly - 3 sets - 10 reps  SLR and hip and 90 hamstring stretches            Total Timed Treatment:     45  mins  Total Time of Visit:             45   mins         ASSESSMENT/PLAN     GOALS    Goals                               Progress Note due by 10/29/21                                                      Recert due by 12/2/2021   STG by: 3 Weeks Comments Date Status   Patient will demonstrate 90 deg Active L knee ROM 90 deg actively today  9/10/21 MET   Patient will demonstrate L knee active extension to neutral  0 deg today  9/29/21 MET   Patient will demonstrate decreased L knee edema  No swelling noted today  9/29/21 MET    LTG by: 6 Weeks         Patient will demonstrate independence with comprehensive HEP Added two hamstring stretches  today 10/21/21 ongoing   Patient will demonstrate >125 deg active L knee flexion  121 deg today  10/13/21 ongoing   Patient will ambulate >250 ft with normal gait pattern  Able to ambulate without AD, however decreased L stance time noted 9/29/21 ongoing   Patient will perform ascending/descending stairs reciprocally utilizing handrail as needed   Prefers to perform stairs with step to pattern. Starting to work on step ups leading with the LLE.  9/29/21 ongoing       Assessment/Plan     ASSESSMENT: Initially her L hamstring length was 42 degrees in a SLR, and with the hip at 90 degrees the length was 40 while the opposite side was 72 and 20. Post intervention they were 76 and 49 respectively.     PLAN: Check her L hamstring length and if today's session reduced any of her subjective symptom reports collected today.    Signature: Ramana Tinoco, PTA          [address] [place] [address]  [phone]

## 2021-10-25 ENCOUNTER — TREATMENT (OUTPATIENT)
Dept: PHYSICAL THERAPY | Facility: CLINIC | Age: 56
End: 2021-10-25

## 2021-10-25 DIAGNOSIS — R26.9 GAIT DISTURBANCE: ICD-10-CM

## 2021-10-25 DIAGNOSIS — Z98.890 STATUS POST MEDIAL MENISCUS REPAIR: Primary | ICD-10-CM

## 2021-10-25 PROCEDURE — 97110 THERAPEUTIC EXERCISES: CPT | Performed by: PHYSICAL THERAPIST

## 2021-10-25 PROCEDURE — 97140 MANUAL THERAPY 1/> REGIONS: CPT | Performed by: PHYSICAL THERAPIST

## 2021-10-25 NOTE — PROGRESS NOTES
Physical Therapy Treatment Note    Patient: Miranda Elliott                                                                     Visit Date: 10/25/2021  :     1965    Referring practitioner:    Kevin Tovar MD  Date of Initial Visit:          Type: THERAPY  Noted: 9/3/2021    Patient seen for 18 sessions    Visit Diagnoses:    ICD-10-CM ICD-9-CM   1. Status post medial meniscus repair  Z98.890 V45.89   2. Gait disturbance  R26.9 781.2     SUBJECTIVE     Subjective Primary complaint is when she is up on her feet for extended period of time and medial posterior knee discomfort.      PAIN: 0/10         OBJECTIVE     Objective      Manual Therapy     44105  Comments   STM L medial hamstring, distal                     Timed Minutes 15        Therapeutic Exercises    23879 Comments   L passive hamstring stretches hip @ 90 and SLR    L LE LAQ's with eccentric focus on Naulitlus  20#, 10 x 2   6 in step ups leading with LLE 2 x 10    TRX hip flex/abd/ext 2 x 5 each direction with each LE       Timed Minutes 25       Therapy Education/Self Care 59959   Details: Recommended icing following PT    Given Home Exercise Program  symptom relief   Progress: New   Education provided to:  Patient   Level of understanding Verbalized and Demonstrated   Timed Minutes      Access Code: [email or web address]  Date: 09/10/2021  Prepared by: Jenny Yousif     Exercises  Supine Quadricep Sets - 1 x daily - 7 x weekly - 3 sets - 10 reps  Active Straight Leg Raise with Quad Set - 1 x daily - 7 x weekly - 3 sets - 10 reps  Sidelying Hip Adduction - 1 x daily - 7 x weekly - 3 sets - 10 reps  Sidelying Hip Abduction - 1 x daily - 7 x weekly - 3 sets - 10 reps  Prone Hip Extension - 1 x daily - 7 x weekly - 3 sets - 10 reps  Standing Weight Shift Side to Side - 1 x daily - 7 x weekly - 3 sets - 10 reps  SLR and hip and 90 hamstring stretches       Total Timed Treatment:     40  mins  Total  Time of Visit:             45   mins         ASSESSMENT/PLAN     GOALS    Goals                               Progress Note due by 10/29/21                                                      Recert due by 12/2/2021   STG by: 3 Weeks Comments Date Status   Patient will demonstrate 90 deg Active L knee ROM 90 deg actively today  9/10/21 MET   Patient will demonstrate L knee active extension to neutral  0 deg today  9/29/21 MET   Patient will demonstrate decreased L knee edema  No swelling noted today  9/29/21 MET    LTG by: 6 Weeks         Patient will demonstrate independence with comprehensive HEP Added two hamstring stretches  today 10/21/21 ongoing   Patient will demonstrate >125 deg active L knee flexion  121 deg today  10/13/21 ongoing   Patient will ambulate >250 ft with normal gait pattern  Able to ambulate without AD, however decreased L stance time noted 9/29/21 ongoing   Patient will perform ascending/descending stairs reciprocally utilizing handrail as needed   Uses handrail for step ups leading with LLE  10/25/21 ongoing       Assessment/Plan     ASSESSMENT: Progress hip stability strengthening today as this is likely a contributing factor in the increased stress applied to the medial L knee. She responded well to STM at end of session. Quad weakness still noted with step ups, however improving as no noted knee buckling.     PLAN: Progress note due next session. Continue to address L quad strengthening and L hamstring extensibility     Signature: Ann Marie Ha, PT, DPT          [address] [place] [address]  [phone]

## 2021-10-28 ENCOUNTER — TREATMENT (OUTPATIENT)
Dept: PHYSICAL THERAPY | Facility: CLINIC | Age: 56
End: 2021-10-28

## 2021-10-28 DIAGNOSIS — Z98.890 STATUS POST MEDIAL MENISCUS REPAIR: Primary | ICD-10-CM

## 2021-10-28 DIAGNOSIS — R26.9 GAIT DISTURBANCE: ICD-10-CM

## 2021-10-28 PROCEDURE — 97140 MANUAL THERAPY 1/> REGIONS: CPT | Performed by: PHYSICAL THERAPIST

## 2021-10-28 PROCEDURE — 97110 THERAPEUTIC EXERCISES: CPT | Performed by: PHYSICAL THERAPIST

## 2021-10-28 NOTE — PROGRESS NOTES
Physical Therapy Treatment Note and 30 Day Progress Note    Patient: Miranda Elliott                                                                     Visit Date: 10/28/2021  :     1965    Referring practitioner:    Kevin Tovar MD  Date of Initial Visit:          Type: THERAPY  Noted: 9/3/2021    Patient seen for 19 sessions    Visit Diagnoses:    ICD-10-CM ICD-9-CM   1. Status post medial meniscus repair  Z98.890 V45.89   2. Gait disturbance  R26.9 781.2     SUBJECTIVE     Subjective Yesterday she noticed that her L knee was hurting more, limping more. She was on her feet most of the day Tuesday and Wednesday. No complaints following previous session.       PAIN: 2/10         OBJECTIVE     Objective      Manual Therapy     17939  Comments   STM L medial hamstring, distal         Timed Minutes 15        Therapeutic Exercises    85175 Comments   Standing unilateral anterior and lateral lunges on BOSU 2 x 10 each    Prone L knee flexion stretch     Supine L knee ROM     KT taping for swelling L knee (untimed: 6 min)         Timed Minutes 25       Therapy Education/Self Care 50006   Details: Recommended icing following PT    Given Home Exercise Program  symptom relief   Progress: New   Education provided to:  Patient   Level of understanding Verbalized and Demonstrated   Timed Minutes      Access Code: [email or web address]  Date: 09/10/2021  Prepared by: Jenny Yousif     Exercises  Supine Quadricep Sets - 1 x daily - 7 x weekly - 3 sets - 10 reps  Active Straight Leg Raise with Quad Set - 1 x daily - 7 x weekly - 3 sets - 10 reps  Sidelying Hip Adduction - 1 x daily - 7 x weekly - 3 sets - 10 reps  Sidelying Hip Abduction - 1 x daily - 7 x weekly - 3 sets - 10 reps  Prone Hip Extension - 1 x daily - 7 x weekly - 3 sets - 10 reps  Standing Weight Shift Side to Side - 1 x daily - 7 x weekly - 3 sets - 10 reps  SLR and hip and 90 hamstring  stretches       Total Timed Treatment:     40  mins  Total Time of Visit:             46   mins         ASSESSMENT/PLAN     GOALS    Goals                               Progress Note due by 11/28/21                                                      Recert due by 12/2/2021   STG by: 3 Weeks Comments Date Status   Patient will demonstrate 90 deg Active L knee ROM 90 deg actively today  9/10/21 MET   Patient will demonstrate L knee active extension to neutral  0 deg today  9/29/21 MET   Patient will demonstrate decreased L knee edema  No swelling noted today  9/29/21 MET    LTG by: 6 Weeks         Patient will demonstrate independence with comprehensive HEP She has been compliant, progressing as able  10/28/21 ongoing   Patient will demonstrate >125 deg active L knee flexion  123 deg today  10/28/21 ongoing   Patient will ambulate >250 ft with normal gait pattern  Able to ambulate without AD, limping still observed  10/28/21 ongoing   Patient will perform ascending/descending stairs reciprocally utilizing handrail as needed   Uses handrail for step ups leading with LLE  10/25/21 ongoing       Assessment & Plan     Assessment  Impairments: abnormal coordination, abnormal gait, abnormal muscle firing, abnormal or restricted ROM, impaired balance, impaired physical strength, lacks appropriate home exercise program, pain with function and weight-bearing intolerance  Functional Limitations: walking, uncomfortable because of pain, standing and stooping  Plan  Therapy options: will be seen for skilled physical therapy services  Planned modality interventions: low level laser therapy, dry needling, high voltage pulsed current (pain management) and cryotherapy  Planned therapy interventions: manual therapy, balance/weight-bearing training, neuromuscular re-education, orthotic fitting/training, postural training, soft tissue mobilization, spinal/joint mobilization, strengthening, stretching, therapeutic activities, home  exercise program, gait training, functional ROM exercises and flexibility  Frequency: 2x week  Duration in visits: 8  Duration in weeks: 4  Treatment plan discussed with: patient         ASSESSMENT: Caryn has met 3 of her goals and continues to make progress toward her remaining goals. Her L knee ROM and strength have greatly improved overall since initial evaluation. Primary limitation is residual L quadriceps weakness to properly perform high level activities such as squatting. She has a muscle trigger point at her distal medial L hamstrings which is contributing to her continued pain and tightness. Today we focused on pain and swelling relief as she was on her feet the last couple days at work. Her awareness of form during exercises is improving, she does require cueing for upright posture and decreasing compensations.     PLAN: Continue to address L quad strengthening and L hamstring extensibility. Address response to KT taping. Review HEP and consider progressing.     Signature: Ann Marie Ha, PT, DPT          [address] [place] [address]  [phone]

## 2021-11-01 ENCOUNTER — TREATMENT (OUTPATIENT)
Dept: PHYSICAL THERAPY | Facility: CLINIC | Age: 56
End: 2021-11-01

## 2021-11-01 DIAGNOSIS — R26.9 GAIT DISTURBANCE: ICD-10-CM

## 2021-11-01 DIAGNOSIS — Z98.890 STATUS POST MEDIAL MENISCUS REPAIR: Primary | ICD-10-CM

## 2021-11-01 PROCEDURE — 97110 THERAPEUTIC EXERCISES: CPT | Performed by: PHYSICAL THERAPIST

## 2021-11-01 NOTE — PROGRESS NOTES
Physical Therapy Treatment Note and 30 Day Progress Note    Patient: Miranda Elliott                                                                     Visit Date: 2021  :     1965    Referring practitioner:    Kevin Tovar MD  Date of Initial Visit:          Type: THERAPY  Noted: 9/3/2021    Patient seen for 20 sessions    Visit Diagnoses:    ICD-10-CM ICD-9-CM   1. Status post medial meniscus repair  Z98.890 V45.89   2. Gait disturbance  R26.9 781.2     SUBJECTIVE     Subjective No complaints following previous session, feeling better compared to last time.        PAIN: 0/10         OBJECTIVE     Objective      Therapeutic Exercises    17347 Comments   Unicam bike, seat 4  Resistance 2, 6 min    TRX strap standing hip flex/abd/ext  3 sets x 5 reps each    Mini squats on wobble board    Mini squats on BOSU (flat side)     LLE lateral step, focusing on eccentric lowering     Resisted walking with TRX rip  25 ft x 6 reps            Timed Minutes 40       Therapy Education/Self Care 67597   Details: Recommended icing following PT    Given Home Exercise Program  symptom relief   Progress: New   Education provided to:  Patient   Level of understanding Verbalized and Demonstrated   Timed Minutes      Access Code: [email or web address]  Date: 09/10/2021  Prepared by: Jenny Yousif     Exercises  Supine Quadricep Sets - 1 x daily - 7 x weekly - 3 sets - 10 reps  Active Straight Leg Raise with Quad Set - 1 x daily - 7 x weekly - 3 sets - 10 reps  Sidelying Hip Adduction - 1 x daily - 7 x weekly - 3 sets - 10 reps  Sidelying Hip Abduction - 1 x daily - 7 x weekly - 3 sets - 10 reps  Prone Hip Extension - 1 x daily - 7 x weekly - 3 sets - 10 reps  Standing Weight Shift Side to Side - 1 x daily - 7 x weekly - 3 sets - 10 reps  SLR and hip and 90 hamstring stretches       Total Timed Treatment:     40  mins  Total Time of Visit:             40   mins          ASSESSMENT/PLAN     GOALS    Goals                               Progress Note due by 11/28/21                                                      Recert due by 12/2/2021   STG by: 3 Weeks Comments Date Status   Patient will demonstrate 90 deg Active L knee ROM 90 deg actively today  9/10/21 MET   Patient will demonstrate L knee active extension to neutral  0 deg today  9/29/21 MET   Patient will demonstrate decreased L knee edema  No swelling noted today  9/29/21 MET    LTG by: 6 Weeks         Patient will demonstrate independence with comprehensive HEP She has been compliant, progressing as able  10/28/21 ongoing   Patient will demonstrate >125 deg active L knee flexion  123 deg today  10/28/21 ongoing   Patient will ambulate >250 ft with normal gait pattern  Able to ambulate without AD, limping still observed  10/28/21 ongoing   Patient will perform ascending/descending stairs reciprocally utilizing handrail as needed   Uses handrail for step ups leading with LLE  10/25/21 ongoing       Assessment & Plan            ASSESSMENT: Continuing to progress LLE strengthening, quadriceps weakness noted. She has a palpable trigger point posterior medial L knee, likely related to hamstring tightness due to compensations. She still has difficulty with standing and walking for long periods of time at work.      PLAN: Continue to address L quad strengthening and L hamstring extensibility.     Signature: Ann Marie Ha, PT, DPT License #: [id number]          [address] [place] [address]  [phone]

## 2021-11-04 ENCOUNTER — TREATMENT (OUTPATIENT)
Dept: PHYSICAL THERAPY | Facility: CLINIC | Age: 56
End: 2021-11-04

## 2021-11-04 DIAGNOSIS — Z98.890 STATUS POST MEDIAL MENISCUS REPAIR: Primary | ICD-10-CM

## 2021-11-04 DIAGNOSIS — R26.9 GAIT DISTURBANCE: ICD-10-CM

## 2021-11-04 PROCEDURE — 97140 MANUAL THERAPY 1/> REGIONS: CPT | Performed by: PHYSICAL THERAPIST

## 2021-11-04 PROCEDURE — 97112 NEUROMUSCULAR REEDUCATION: CPT | Performed by: PHYSICAL THERAPIST

## 2021-11-04 NOTE — PROGRESS NOTES
Physical Therapy Treatment Note    Patient: Miranda Elliott                                                                     Visit Date: 2021  :     1965    Referring practitioner:    Kevin Tovar MD  Date of Initial Visit:          Type: THERAPY  Noted: 9/3/2021    Patient seen for 21 sessions    Visit Diagnoses:    ICD-10-CM ICD-9-CM   1. Status post medial meniscus repair  Z98.890 V45.89   2. Gait disturbance  R26.9 781.2     SUBJECTIVE     Subjective No new complaints reported.        PAIN: 0/10 > 0/10     OBJECTIVE     Objective      Manual Therapy     [id number]  Comments   L patellar mobilization, reclined  Gr 2 -- hypomobile but improved   L ankle passive DF stretch Applied moist heat pack to distal hamstring (15 min, not billed)   IASTM with edge (stick on) tool, prone L hamstring, calf - focus on distal hamstring   DMR with massage cream, prone L distal hamstring       Timed Minutes 29     Neuromuscular Reeducation     34944 Comments   NMES ([place]) to L VMO - max amp 22 - 10/10 cycle Originally began with L SLR, but was painful in hip, so transitioned to SAQ over large bolster for remaining 7 min       Timed Minutes 15     Therapy Education/Self Care 89231   Details: Proper shoe wear for support   Given Home Exercise Program  symptom relief   Progress: Reinforced   Education provided to:  Patient   Level of understanding Verbalized   Timed Minutes      Access Code: [email or web address]  Date: 09/10/2021  Prepared by: Jenny Yousif     Exercises  Supine Quadricep Sets - 1 x daily - 7 x weekly - 3 sets - 10 reps  Active Straight Leg Raise with Quad Set - 1 x daily - 7 x weekly - 3 sets - 10 reps  Sidelying Hip Adduction - 1 x daily - 7 x weekly - 3 sets - 10 reps  Sidelying Hip Abduction - 1 x daily - 7 x weekly - 3 sets - 10 reps  Prone Hip Extension - 1 x daily - 7 x weekly - 3 sets - 10 reps  Standing Weight Shift Side to Side - 1 x daily -  7 x weekly - 3 sets - 10 reps  SLR and hip and 90 hamstring stretches       Total Timed Treatment:     44  mins  Total Time of Visit:             45   mins         ASSESSMENT/PLAN     GOALS    Goals                                       Progress Note due by 11/28/21                                                         Recert due by 12/2/2021   STG by: 3 Weeks Comments Date Status   Patient will demonstrate 90 deg Active L knee ROM 90 deg actively today  9/10/21 MET   Patient will demonstrate L knee active extension to neutral  0 deg today  9/29/21 MET   Patient will demonstrate decreased L knee edema  No swelling noted today  9/29/21 MET    LTG by: 6 Weeks         Patient will demonstrate independence with comprehensive HEP She has been compliant, progressing as able  10/28/21 ongoing   Patient will demonstrate >125 deg active L knee flexion  123 deg today  10/28/21 ongoing   Patient will ambulate >250 ft with normal gait pattern  Able to ambulate without AD, limping still observed  10/28/21 ongoing   Patient will perform ascending/descending stairs reciprocally utilizing handrail as needed   Utilized NMES for quad strengthening to improve control with ascend/descend stairs 11/4/21 ongoing     Assessment/Plan     ASSESSMENT: Utilized NMES to address noted L quadriceps weakness. Patient reported pain in L hip during SLR, but tolerated SAQ well. Trigger point noted in L distal hamstring is likely a build-up of adipose tissue as a result of trauma to the surrounding area. Patient denied pain with deep pressure or at any juncture, which further supports my suspicions. L hamstrings continue to demonstrate decreased extensibility, but appears to be improving overall.     PLAN: Consider continued use of NMES, possibly with TKE on shuttle press. Consider eccentric focus (e.g. heel taps from step) with quad strengthening exercises.     Signature: Yesenia Lake PTA License #: [id number]    Electronically Signed on  11/4/2021          [address] [place] [address]. [address]  [phone]

## 2021-11-05 ENCOUNTER — OFFICE VISIT (OUTPATIENT)
Dept: OBSTETRICS AND GYNECOLOGY | Facility: CLINIC | Age: 56
End: 2021-11-05

## 2021-11-05 VITALS
WEIGHT: 236 LBS | SYSTOLIC BLOOD PRESSURE: 130 MMHG | BODY MASS INDEX: 37.93 KG/M2 | DIASTOLIC BLOOD PRESSURE: 90 MMHG | HEIGHT: 66 IN

## 2021-11-05 DIAGNOSIS — Z01.419 WELL WOMAN EXAM WITH ROUTINE GYNECOLOGICAL EXAM: Primary | ICD-10-CM

## 2021-11-05 DIAGNOSIS — N95.1 MENOPAUSAL SYMPTOMS: ICD-10-CM

## 2021-11-05 DIAGNOSIS — R63.5 WEIGHT GAIN: ICD-10-CM

## 2021-11-05 DIAGNOSIS — Z12.31 ENCOUNTER FOR SCREENING MAMMOGRAM FOR BREAST CANCER: ICD-10-CM

## 2021-11-05 DIAGNOSIS — F17.200 SMOKER: ICD-10-CM

## 2021-11-05 DIAGNOSIS — N89.8 VAGINAL DRYNESS: ICD-10-CM

## 2021-11-05 DIAGNOSIS — Z13.820 ENCOUNTER FOR SCREENING FOR OSTEOPOROSIS: ICD-10-CM

## 2021-11-05 DIAGNOSIS — N89.8 VAGINAL IRRITATION: ICD-10-CM

## 2021-11-05 PROCEDURE — 99396 PREV VISIT EST AGE 40-64: CPT | Performed by: NURSE PRACTITIONER

## 2021-11-05 PROCEDURE — 99213 OFFICE O/P EST LOW 20 MIN: CPT | Performed by: NURSE PRACTITIONER

## 2021-11-05 PROCEDURE — G0123 SCREEN CERV/VAG THIN LAYER: HCPCS | Performed by: NURSE PRACTITIONER

## 2021-11-05 PROCEDURE — 87624 HPV HI-RISK TYP POOLED RSLT: CPT | Performed by: NURSE PRACTITIONER

## 2021-11-05 RX ORDER — NYSTATIN AND TRIAMCINOLONE ACETONIDE 100000; 1 [USP'U]/G; MG/G
1 OINTMENT TOPICAL 2 TIMES DAILY
Qty: 30 G | Refills: 3 | Status: SHIPPED | OUTPATIENT
Start: 2021-11-05 | End: 2022-05-11

## 2021-11-05 RX ORDER — SEMAGLUTIDE 0.25 MG/.5ML
0.25 INJECTION, SOLUTION SUBCUTANEOUS WEEKLY
Qty: 2 ML | Refills: 3 | Status: SHIPPED | OUTPATIENT
Start: 2021-11-05 | End: 2021-12-29

## 2021-11-05 NOTE — PROGRESS NOTES
"Subjective     Miranda Elliott is a 56 y.o. female    Patient is here today for yearly checkup.  She has complaints of vaginal irritation and of weight gain.    Gynecologic Exam  The patient's primary symptoms include genital itching. The patient's pertinent negatives include no pelvic pain, vaginal bleeding or vaginal discharge. This is a new problem. The current episode started more than 1 month ago. The problem occurs intermittently. The problem has been waxing and waning. The patient is experiencing no pain. Pertinent negatives include no abdominal pain, anorexia, back pain, chills, constipation, diarrhea, discolored urine, dysuria, fever, flank pain, frequency, headaches, hematuria, joint pain, joint swelling, nausea, painful intercourse, rash, sore throat, urgency or vomiting. Nothing aggravates the symptoms. She has tried nothing for the symptoms. She is sexually active. She is postmenopausal.         /90   Ht 167.6 cm (65.98\")   Wt 107 kg (236 lb)   LMP  (LMP Unknown)   BMI 38.11 kg/m²     Outpatient Encounter Medications as of 11/5/2021   Medication Sig Dispense Refill   • ALPRAZolam (XANAX) 1 MG tablet Take 1 tablet by mouth 3 (Three) Times a Day As Needed for Anxiety. 90 tablet 0   • nystatin-triamcinolone (MYCOLOG) 539332-3.1 UNIT/GM-% ointment Apply 1 application topically to the appropriate area as directed 2 (Two) Times a Day. 30 g 3   • Semaglutide-Weight Management (semaglutide) 0.25 MG/0.5ML solution auto-injector Inject 0.25 mg under the skin into the appropriate area as directed 1 (One) Time Per Week. 2 mL 3   • [DISCONTINUED] clarithromycin (BIAXIN) 500 MG tablet Take 1 tablet by mouth 2 (Two) Times a Day. 20 tablet 0   • [DISCONTINUED] HYDROcod Polst-CPM Polst ER (Tussionex Pennkinetic ER) 10-8 MG/5ML ER suspension Take 5 mL by mouth Every 12 (Twelve) Hours As Needed for Cough. 60 mL 0   • [DISCONTINUED] methylPREDNISolone (MEDROL) 4 MG dose pack Take as directed on package " instructions. 21 tablet 0     No facility-administered encounter medications on file as of 11/5/2021.       Surgical History  Past Surgical History:   Procedure Laterality Date   • ADENOIDECTOMY     • BACK SURGERY  1998    Lumbar gallagher   • BACK SURGERY  2012    lumbar fusion   • COLONOSCOPY  2012   • ENDOMETRIAL ABLATION     • KNEE ARTHROSCOPY Left 7/22/2021    Procedure: LEFT KNEE ARTHROSCOPIC PARTIAL MEDIAL MENISECTOMY;  Surgeon: Kevin Tovar MD;  Location: [facility];  Service: Orthopedics;  Laterality: Left;   • SALPINGO OOPHORECTOMY Right    • TONSILLECTOMY         Family History  Family History   Problem Relation Age of Onset   • Diabetes Father    • No Known Problems Mother    • No Known Problems Sister    • Prostate cancer Maternal Grandfather    • Breast cancer Neg Hx    • Ovarian cancer Neg Hx    • Uterine cancer Neg Hx    • Colon cancer Neg Hx    • Melanoma Neg Hx        The following portions of the patient's history were reviewed and updated as appropriate: allergies, current medications, past family history, past medical history, past social history, past surgical history and problem list.    Review of Systems   Constitutional: Positive for unexpected weight gain. Negative for activity change, appetite change, chills, diaphoresis, fatigue, fever and unexpected weight loss.   HENT: Negative for congestion, dental problem, drooling, ear discharge, ear pain, facial swelling, hearing loss, mouth sores, nosebleeds, postnasal drip, rhinorrhea, sinus pressure, sneezing, sore throat, swollen glands, tinnitus, trouble swallowing and voice change.    Eyes: Negative for blurred vision, double vision, photophobia, pain, discharge, redness, itching and visual disturbance.   Respiratory: Negative for apnea, cough, choking, chest tightness, shortness of breath, wheezing and stridor.    Cardiovascular: Negative for chest pain, palpitations and leg swelling.   Gastrointestinal: Negative for abdominal distention, abdominal  pain, anal bleeding, anorexia, blood in stool, constipation, diarrhea, nausea, rectal pain, vomiting, GERD and indigestion.   Endocrine: Positive for heat intolerance. Negative for cold intolerance, polydipsia, polyphagia and polyuria.   Genitourinary: Positive for vaginal pain. Negative for amenorrhea, breast discharge, breast lump, breast pain, decreased libido, decreased urine volume, difficulty urinating, dyspareunia, dysuria, flank pain, frequency, genital sores, hematuria, menstrual problem, pelvic pain, pelvic pressure, urgency, urinary incontinence, vaginal bleeding and vaginal discharge.   Musculoskeletal: Negative for arthralgias, back pain, gait problem, joint pain, joint swelling, myalgias, neck pain, neck stiffness and bursitis.   Skin: Negative for color change, dry skin and rash.   Allergic/Immunologic: Negative for environmental allergies, food allergies and immunocompromised state.   Neurological: Negative for dizziness, tremors, seizures, syncope, facial asymmetry, speech difficulty, weakness, light-headedness, numbness, headache, memory problem and confusion.   Hematological: Negative for adenopathy. Does not bruise/bleed easily.   Psychiatric/Behavioral: Negative for agitation, behavioral problems, decreased concentration, dysphoric mood, hallucinations, self-injury, sleep disturbance, suicidal ideas, negative for hyperactivity, depressed mood and stress. The patient is not nervous/anxious.        Objective   Physical Exam  Vitals and nursing note reviewed. Exam conducted with a chaperone present.   Constitutional:       General: She is not in acute distress.     Appearance: She is well-developed. She is not diaphoretic.   HENT:      Head: Normocephalic.      Right Ear: External ear normal.      Left Ear: External ear normal.      Nose: Nose normal.   Eyes:      General: No scleral icterus.        Right eye: No discharge.         Left eye: No discharge.      Conjunctiva/sclera: Conjunctivae  normal.      Pupils: Pupils are equal, round, and reactive to light.   Neck:      Thyroid: No thyromegaly.      Vascular: No carotid bruit.      Trachea: No tracheal deviation.   Cardiovascular:      Rate and Rhythm: Normal rate and regular rhythm.      Heart sounds: Normal heart sounds. No murmur heard.      Pulmonary:      Effort: Pulmonary effort is normal. No respiratory distress.      Breath sounds: Normal breath sounds. No wheezing.   Chest:   Breasts: Breasts are symmetrical.      Right: Normal. No swelling, bleeding, inverted nipple, mass, nipple discharge, skin change, tenderness, axillary adenopathy or supraclavicular adenopathy.      Left: Normal. No swelling, bleeding, inverted nipple, mass, nipple discharge, skin change, tenderness, axillary adenopathy or supraclavicular adenopathy.       Abdominal:      General: There is no distension.      Palpations: Abdomen is soft. There is no mass.      Tenderness: There is no abdominal tenderness. There is no right CVA tenderness, left CVA tenderness or guarding.      Hernia: No hernia is present. There is no hernia in the left inguinal area or right inguinal area.   Genitourinary:     General: Normal vulva.      Exam position: Lithotomy position.      Labia:         Right: No rash, tenderness, lesion or injury.         Left: No rash, tenderness, lesion or injury.       Vagina: Normal. No signs of injury and foreign body. No vaginal discharge, erythema, tenderness or bleeding.      Cervix: Normal.      Uterus: Normal. Not enlarged, not fixed and not tender.       Adnexa: Right adnexa normal and left adnexa normal.        Right: No mass, tenderness or fullness.          Left: No mass, tenderness or fullness.        Rectum: Normal. No mass.      Comments:   BSU normal  Urethral meatus  Normal  Perineum  Normal  Musculoskeletal:         General: No tenderness. Normal range of motion.      Cervical back: Normal range of motion and neck supple.   Lymphadenopathy:       Head:      Right side of head: No submental, submandibular, tonsillar, preauricular, posterior auricular or occipital adenopathy.      Left side of head: No submental, submandibular, tonsillar, preauricular, posterior auricular or occipital adenopathy.      Cervical: No cervical adenopathy.      Right cervical: No superficial, deep or posterior cervical adenopathy.     Left cervical: No superficial, deep or posterior cervical adenopathy.      Upper Body:      Right upper body: No supraclavicular, axillary or pectoral adenopathy.      Left upper body: No supraclavicular, axillary or pectoral adenopathy.      Lower Body: No right inguinal adenopathy. No left inguinal adenopathy.   Skin:     General: Skin is warm and dry.      Findings: No bruising, erythema or rash.   Neurological:      Mental Status: She is alert and oriented to person, place, and time.      Coordination: Coordination normal.   Psychiatric:         Mood and Affect: Mood normal.         Behavior: Behavior normal.         Thought Content: Thought content normal.         Judgment: Judgment normal.         Assessment/Plan   Diagnoses and all orders for this visit:    1. Well woman exam with routine gynecological exam (Primary)  Normal GYN exam. Will have lab work at work and will send me those results.. Encouraged SBE, pt is aware how to do self breast exam and the importance of same. Discussed weight management and importance of maintaining a healthy weight. Discussed Vitamin D intake and the importance of adequate vitamin D for both Bone Health and a healthy immune system.  Discussed Daily exercise and the importance of same, in regards to a healthy heart as well as helping to maintain her weight and improving her mental health.  BMI 38.1.  Colonoscopy is up to date.  Mammogram and bone density will be scheduled at [facility].  Pap smear is done per ASCCP guidelines.  -     Liquid-based Pap Smear, Screening    2. Encounter for screening  mammogram for breast cancer  Comments:  Patient will have mammogram at [facility].  Orders:  -     Mammo Screening Digital Tomosynthesis Bilateral With CAD; Future    3. Encounter for screening for osteoporosis  Comments:  Patient will have a bone density at [facility].  Orders:  -     DEXA Bone Density Axial; Future    4. Smoker  Comments:  Patient is a smoker and is encouraged to stop.  She wishes to do the low-dose CAT scan.  Orders placed.  Orders:  -      CT Chest Low Dose Cancer Screening WO; Future    5. Vaginal irritation  Comments:  Patient complains of vaginal irritation.  She has used Mycolog ointment in the past with good results.  She would like a refill  Orders:  -     nystatin-triamcinolone (MYCOLOG) 935518-0.1 UNIT/GM-% ointment; Apply 1 application topically to the appropriate area as directed 2 (Two) Times a Day.  Dispense: 30 g; Refill: 3    6. Menopausal symptoms  Comments:  Patient is having some menopausal symptoms such as hot flashes, night sweats, vaginal dryness.  She does not want anything for that at this time.    7. Weight gain  Comments:  Patient has gained weight and seems that she cannot get it off.  We will try Wegovy.  She will RTO in 1 month.  Orders:  -     Semaglutide-Weight Management (semaglutide) 0.25 MG/0.5ML solution auto-injector; Inject 0.25 mg under the skin into the appropriate area as directed 1 (One) Time Per Week.  Dispense: 2 mL; Refill: 3    8. Vaginal dryness  Comments:  Patient will try coconut oil daily.         Patient's Body mass index is 38.11 kg/m². indicating that she is obese (BMI >30). Obesity-related health conditions include the following: none. Obesity is unchanged. BMI is is above average; BMI management plan is completed. We discussed portion control and increasing exercise..      Luzmaria Calhoun, APRN  11/5/2021

## 2021-11-05 NOTE — PATIENT INSTRUCTIONS
"BMI for Adults  What is BMI?  Body mass index (BMI) is a number that is calculated from a person's weight and height. BMI can help estimate how much of a person's weight is composed of fat. BMI does not measure body fat directly. Rather, it is an alternative to procedures that directly measure body fat, which can be difficult and expensive.  BMI can help identify people who may be at higher risk for certain medical problems.  What are BMI measurements used for?  BMI is used as a screening tool to identify possible weight problems. It helps determine whether a person is obese, overweight, a healthy weight, or underweight.  BMI is useful for:  · Identifying a weight problem that may be related to a medical condition or may increase the risk for medical problems.  · Promoting changes, such as changes in diet and exercise, to help reach a healthy weight. BMI screening can be repeated to see if these changes are working.  How is BMI calculated?  BMI involves measuring your weight in relation to your height. Both height and weight are measured, and the BMI is calculated from those numbers. This can be done either in English (U.S.) or metric measurements. Note that charts and online BMI calculators are available to help you find your BMI quickly and easily without having to do these calculations yourself.  To calculate your BMI in English (U.S.) measurements:    1. Measure your weight in pounds (lb).  2. Multiply the number of pounds by 703.  ? For example, for a person who weighs 180 lb, multiply that number by 703, which equals 126,540.  3. Measure your height in inches. Then multiply that number by itself to get a measurement called \"inches squared.\"  ? For example, for a person who is 70 inches tall, the \"inches squared\" measurement is 70 inches x 70 inches, which equals 4,900 inches squared.  4. Divide the total from step 2 (number of lb x 703) by the total from step 3 (inches squared): 126,540 ÷ 4,900 = 25.8. This is " "your BMI.    To calculate your BMI in metric measurements:  1. Measure your weight in kilograms (kg).  2. Measure your height in meters (m). Then multiply that number by itself to get a measurement called \"meters squared.\"  ? For example, for a person who is 1.75 m tall, the \"meters squared\" measurement is 1.75 m x 1.75 m, which is equal to 3.1 meters squared.  3. Divide the number of kilograms (your weight) by the meters squared number. In this example: 70 ÷ 3.1 = 22.6. This is your BMI.  What do the results mean?  BMI charts are used to identify whether you are underweight, normal weight, overweight, or obese. The following guidelines will be used:  · Underweight: BMI less than 18.5.  · Normal weight: BMI between 18.5 and 24.9.  · Overweight: BMI between 25 and 29.9.  · Obese: BMI of 30 or above.  Keep these notes in mind:  · Weight includes both fat and muscle, so someone with a muscular build, such as an athlete, may have a BMI that is higher than 24.9. In cases like these, BMI is not an accurate measure of body fat.  · To determine if excess body fat is the cause of a BMI of 25 or higher, further assessments may need to be done by a health care provider.  · BMI is usually interpreted in the same way for men and women.  Where to find more information  For more information about BMI, including tools to quickly calculate your BMI, go to these websites:  · Centers for Disease Control and Prevention: www.cdc.gov  · American Heart Association: www.heart.org  · National Heart, Lung, [facility]: www.nhlbi.nih.gov  Summary  · Body mass index (BMI) is a number that is calculated from a person's weight and height.  · BMI may help estimate how much of a person's weight is composed of fat. BMI can help identify those who may be at higher risk for certain medical problems.  · BMI can be measured using English measurements or metric measurements.  · BMI charts are used to identify whether you are underweight, normal " weight, overweight, or obese.  This information is not intended to replace advice given to you by your health care provider. Make sure you discuss any questions you have with your health care provider.  Document Revised: 09/09/2020 Document Reviewed: 07/17/2020  Elsevier Patient Education © 2021 [employer] Inc[employer]

## 2021-11-08 ENCOUNTER — TREATMENT (OUTPATIENT)
Dept: PHYSICAL THERAPY | Facility: CLINIC | Age: 56
End: 2021-11-08

## 2021-11-08 DIAGNOSIS — R26.9 GAIT DISTURBANCE: ICD-10-CM

## 2021-11-08 DIAGNOSIS — Z98.890 STATUS POST MEDIAL MENISCUS REPAIR: Primary | ICD-10-CM

## 2021-11-08 LAB
GEN CATEG CVX/VAG CYTO-IMP: NORMAL
HPV I/H RISK 4 DNA CVX QL PROBE+SIG AMP: NOT DETECTED
LAB AP CASE REPORT: NORMAL
LAB AP GYN ADDITIONAL INFORMATION: NORMAL
LAB AP GYN OTHER FINDINGS: NORMAL
PATH INTERP SPEC-IMP: NORMAL
STAT OF ADQ CVX/VAG CYTO-IMP: NORMAL

## 2021-11-08 PROCEDURE — 97110 THERAPEUTIC EXERCISES: CPT | Performed by: PHYSICAL THERAPIST

## 2021-11-08 PROCEDURE — 97112 NEUROMUSCULAR REEDUCATION: CPT | Performed by: PHYSICAL THERAPIST

## 2021-11-08 NOTE — PROGRESS NOTES
"                                                                Physical Therapy Treatment Note    Patient: Miranda Elliott                                                                     Visit Date: 2021  :     1965    Referring practitioner:    Kevin Tovar MD  Date of Initial Visit:          Type: THERAPY  Noted: 9/3/2021    Patient seen for 22 sessions    Visit Diagnoses:    ICD-10-CM ICD-9-CM   1. Status post medial meniscus repair  Z98.890 V45.89   2. Gait disturbance  R26.9 781.2     SUBJECTIVE     Subjective She has had some pain in the bottom and top part of her knee. She had to work this weekend and was on her feet. She got two new pairs of Morgan, today is first day wearing them.         PAIN: 2/10 > 0/10     OBJECTIVE     Objective     Neuromuscular Reeducation     85721 Comments   NMES (Australian) to L VMO - max amp 6/7 LAQ       Timed Minutes 20     Therapeutic Exercises    [id number] Comments   Wall sits with 45 cm physioball  2 x 10 ea   Heel taps from 6\" step at stair model 2 x 10 ea   SLR with 45 cm physioball against wall, supine 2 x 10    Shuttle press with focus on eccentric lowering and quad set at TKE, 8 cords 2 x 10        Timed Minutes 25     Therapy Education/Self Care 65518   Details: Progression with HEP, prioritize quad strengthening   Given Home Exercise Program  symptom relief   Progress: Reinforced   Education provided to:  Patient   Level of understanding Verbalized   Timed Minutes      Access Code: [email or web address]  Date: 2021  Prepared by: Yesenia Lake    Exercises  Active Straight Leg Raise with Quad Set - 1 x daily - 7 x weekly - 3 sets - 10 reps  Sidelying Hip Adduction - 1 x daily - 7 x weekly - 3 sets - 10 reps  Sidelying Hip Abduction - 1 x daily - 7 x weekly - 3 sets - 10 reps  Prone Hip Extension - 1 x daily - 7 x weekly - 3 sets - 10 reps  Seated Long Arc Quad - 1-2 x daily - 7 x weekly - 2 sets - 10 reps  Mini Squat with Counter Support - 1-2 x daily - 7 x " "weekly - 2 sets - 10 reps    Total Timed Treatment:     45  mins  Total Time of Visit:             45   mins         ASSESSMENT/PLAN     GOALS    Goals                                       Progress Note due by 11/28/21                                                         Recert due by 12/2/2021   STG by: 3 Weeks Comments Date Status   Patient will demonstrate 90 deg Active L knee ROM 90 deg actively today  9/10/21 MET   Patient will demonstrate L knee active extension to neutral  0 deg today  9/29/21 MET   Patient will demonstrate decreased L knee edema  No swelling noted today  9/29/21 MET    LTG by: 6 Weeks         Patient will demonstrate independence with comprehensive HEP She has been compliant, progressing as able  10/28/21 ongoing   Patient will demonstrate >125 deg active L knee flexion  123 deg today  10/28/21 ongoing   Patient will ambulate >250 ft with normal gait pattern  Able to ambulate without AD, limping still observed  10/28/21 ongoing   Patient will perform ascending/descending stairs reciprocally utilizing handrail as needed   Followed NMES with eccentric lowering from 6\" stair. Required B UE support.  11/8/21 ongoing     Assessment/Plan     ASSESSMENT: Continued use of NMES to L VMO and followed with focus on quad strengthening, especially eccentrically. She fatigued quickly, especially with eccentric lowering but overall tolerated without pain reported by end of session.     PLAN: Continue to prioritize quad strengthening, especially eccentric movements (heel taps and wall squats using therapy ball). Utilize NMES to L VMO and provide updated HEP (focus on quad strengthening). Consider retro walking on treadmill.     Signature: Yesenia Lake PTA License #: [id number]    Electronically Signed on 11/8/2021          [address] [place] [address] [place] [address]  [phone]  "

## 2021-11-11 ENCOUNTER — TREATMENT (OUTPATIENT)
Dept: PHYSICAL THERAPY | Facility: CLINIC | Age: 56
End: 2021-11-11

## 2021-11-11 DIAGNOSIS — R26.9 GAIT DISTURBANCE: ICD-10-CM

## 2021-11-11 DIAGNOSIS — Z98.890 STATUS POST MEDIAL MENISCUS REPAIR: Primary | ICD-10-CM

## 2021-11-11 PROCEDURE — 97112 NEUROMUSCULAR REEDUCATION: CPT

## 2021-11-11 PROCEDURE — 97110 THERAPEUTIC EXERCISES: CPT

## 2021-11-11 NOTE — PROGRESS NOTES
"                                                                Physical Therapy Treatment Note    Patient: Miranda Elliott                                                                     Visit Date: 2021  :     1965    Referring practitioner:    Kevin Tovar MD  Date of Initial Visit:          Type: THERAPY  Noted: 9/3/2021    Patient seen for 23 sessions    Visit Diagnoses:    ICD-10-CM ICD-9-CM   1. Status post medial meniscus repair  Z98.890 V45.89   2. Gait disturbance  R26.9 781.2     SUBJECTIVE     Subjective She reports her new Morgan are really hurting her, she can feel her feet \"squish\" which hurts her knees. Pain has been 1-2/10 at times over the past couple days, but believes this has been more shoe related than post surgical.     PAIN: 0/10 (sitting) > 0/10     OBJECTIVE     Objective     Neuromuscular Reeducation     [id number] Comments   NMES (Swazi) to L VMO - max amp / SLR x10 (L hip pain), SAQ for remainder (7 min)       Timed Minutes 15     Therapeutic Exercises    41352 Comments   Wall sits with 55 cm physioball  2 x 10    Heel taps from 6\" step at stair model 2 x 10 ea   Shuttle press with focus on eccentric lowering and quad set at TKE, 8 cords 2 x 10    Retro walking on treadmill, 0.8 mph, 0% incline 5 min   L LAQ with GTB 2 x 10        Timed Minutes 23     Therapy Education/Self Care [id number]   Details: Provided written handout of updated HEP, GTB    Given Home Exercise Program  symptom relief   Progress: Reinforced   Education provided to:  Patient   Level of understanding Verbalized   Timed Minutes      Access Code: [email or web address]  Date: 2021  Prepared by: Yesenia Lake    Exercises  Active Straight Leg Raise with Quad Set - 1 x daily - 7 x weekly - 3 sets - 10 reps  Sidelying Hip Adduction - 1 x daily - 7 x weekly - 3 sets - 10 reps  Sidelying Hip Abduction - 1 x daily - 7 x weekly - 3 sets - 10 reps  Prone Hip Extension - 1 x daily - 7 x weekly - 3 sets - 10 reps  Seated " "Long Arc Quad - 1-2 x daily - 7 x weekly - 2 sets - 10 reps  Mini Squat with Counter Support - 1-2 x daily - 7 x weekly - 2 sets - 10 reps    Total Timed Treatment:     38  mins  Total Time of Visit:             45   mins         ASSESSMENT/PLAN     GOALS    Goals                                       Progress Note due by 11/28/21                                                         Recert due by 12/2/2021   STG by: 3 Weeks Comments Date Status   Patient will demonstrate 90 deg Active L knee ROM 90 deg actively today  9/10/21 MET   Patient will demonstrate L knee active extension to neutral  0 deg today  9/29/21 MET   Patient will demonstrate decreased L knee edema  No swelling noted today  9/29/21 MET    LTG by: 6 Weeks         Patient will demonstrate independence with comprehensive HEP She has been compliant, progressing as able  10/28/21 ongoing   Patient will demonstrate >125 deg active L knee flexion  123 deg today  10/28/21 ongoing   Patient will ambulate >250 ft with normal gait pattern  Retro walking today did cause a slight \"tinge\" but gait mechanics are overall improving.  11/11/21 ongoing   Patient will perform ascending/descending stairs reciprocally utilizing handrail as needed   Followed NMES with eccentric lowering from 6\" stair. Required B UE support.  11/8/21 ongoing     Assessment/Plan     ASSESSMENT: Reports pain in B knees has increased, attributes this more to new shoes than \"surgical pain\". Continued use of NMES followed by eccentric quad strengthening as she is getting good response to this. Introduced retro walking and did report a \"twinge\" in her knee, but tolerated.     PLAN: Continue NMES to L VMO and continue to prioritize quad strengthening, especially eccentric movements.    Signature: Yesenia Lake PTA License #: [id number]    Electronically Signed on 11/8/2021          [address] [place] [address]. [address]  [phone]  "

## 2021-11-16 ENCOUNTER — TREATMENT (OUTPATIENT)
Dept: PHYSICAL THERAPY | Facility: CLINIC | Age: 56
End: 2021-11-16

## 2021-11-16 DIAGNOSIS — Z98.890 STATUS POST MEDIAL MENISCUS REPAIR: Primary | ICD-10-CM

## 2021-11-16 DIAGNOSIS — R26.9 GAIT DISTURBANCE: ICD-10-CM

## 2021-11-16 PROCEDURE — 97110 THERAPEUTIC EXERCISES: CPT

## 2021-11-16 NOTE — PROGRESS NOTES
"                                                                Physical Therapy Treatment Note    Patient: Miranda Elliott                                                                     Visit Date: 2021  :     1965    Referring practitioner:    Kevin Tovar MD  Date of Initial Visit:          Type: THERAPY  Noted: 9/3/2021    Patient seen for 24 sessions    Visit Diagnoses:    ICD-10-CM ICD-9-CM   1. Status post medial meniscus repair  Z98.890 V45.89   2. Gait disturbance  R26.9 781.2     SUBJECTIVE     Subjective She reports her BLE aching has continued due to her new shoes, therefore she has switched to her old shoes.      PAIN: 0/10      OBJECTIVE     Objective     Therapeutic Exercises    59948 Comments   Unicam, level 2 resistance, seat at 3  5 min    Squats with TRX straps  2 x 10; refined mechanics to improved improve posterior weight shifting    Hip flex/abd/ext with TRX straps     Heel taps from 6\" step at stair model 2 x 10 ea   Push/pull sled, 20 lbs 50 ft x 3   L LAQ/Knee flex/Hip IR/ER with GTB 2 x 10    Side stepping with red t band     Timed Minutes 45     Therapy Education/Self Care 43671   Details: Provided written handout of updated HEP, GTB    Given Home Exercise Program  symptom relief   Progress: Reinforced   Education provided to:  Patient   Level of understanding Verbalized   Timed Minutes      Access Code: [email or web address]  Date: 2021  Prepared by: Yesenia Lake    Exercises  Active Straight Leg Raise with Quad Set - 1 x daily - 7 x weekly - 3 sets - 10 reps  Sidelying Hip Adduction - 1 x daily - 7 x weekly - 3 sets - 10 reps  Sidelying Hip Abduction - 1 x daily - 7 x weekly - 3 sets - 10 reps  Prone Hip Extension - 1 x daily - 7 x weekly - 3 sets - 10 reps  Seated Long Arc Quad - 1-2 x daily - 7 x weekly - 2 sets - 10 reps  Mini Squat with Counter Support - 1-2 x daily - 7 x weekly - 2 sets - 10 reps    Total Timed Treatment:     45  mins  Total Time of Visit:             " "45   mins         ASSESSMENT/PLAN     GOALS    Goals                                       Progress Note due by 11/28/21                                                         Recert due by 12/2/2021   STG by: 3 Weeks Comments Date Status   Patient will demonstrate 90 deg Active L knee ROM 90 deg actively today  9/10/21 MET   Patient will demonstrate L knee active extension to neutral  0 deg today  9/29/21 MET   Patient will demonstrate decreased L knee edema  No swelling noted today  9/29/21 MET    LTG by: 6 Weeks         Patient will demonstrate independence with comprehensive HEP She has been compliant, progressing as able  10/28/21 ongoing   Patient will demonstrate >125 deg active L knee flexion  123 deg today  10/28/21 ongoing   Patient will ambulate >250 ft with normal gait pattern  Retro walking today did cause a slight \"tinge\" but gait mechanics are overall improving.  11/11/21 ongoing   Patient will perform ascending/descending stairs reciprocally utilizing handrail as needed   Followed NMES with eccentric lowering from 6\" stair. Required B UE support.  11/8/21 ongoing     Assessment/Plan     ASSESSMENT: Decreasing frequency to 1x/week as patient has been compliant with HEP and will continue to strengthen at home. Focused on LLE strengthening this date, primarily L quads. Did work on squat mechanics as she was not getting posterior weight shifting. Did improve with cueing and demonstration.     PLAN: Consider NMES to L VMO, review squat mechanics.     Signature: Ann Marie Castañeda, PT, DPT License #: [id number]    Electronically Signed on 11/16/21          [address] [place] [address]. [address]  [phone]  "

## 2021-11-23 ENCOUNTER — DOCUMENTATION (OUTPATIENT)
Dept: CT IMAGING | Facility: HOSPITAL | Age: 56
End: 2021-11-23

## 2021-11-23 ENCOUNTER — HOSPITAL ENCOUNTER (OUTPATIENT)
Dept: CT IMAGING | Facility: HOSPITAL | Age: 56
Discharge: HOME | End: 2021-11-23
Admitting: NURSE PRACTITIONER

## 2021-11-23 DIAGNOSIS — F17.200 SMOKER: ICD-10-CM

## 2021-11-23 PROCEDURE — 71271 CT THORAX LUNG CANCER SCR C-: CPT

## 2021-11-24 ENCOUNTER — TREATMENT (OUTPATIENT)
Dept: PHYSICAL THERAPY | Facility: CLINIC | Age: 56
End: 2021-11-24

## 2021-11-24 ENCOUNTER — DOCUMENTATION (OUTPATIENT)
Dept: CT IMAGING | Facility: HOSPITAL | Age: 56
End: 2021-11-24

## 2021-11-24 DIAGNOSIS — R26.9 GAIT DISTURBANCE: ICD-10-CM

## 2021-11-24 DIAGNOSIS — Z98.890 STATUS POST MEDIAL MENISCUS REPAIR: Primary | ICD-10-CM

## 2021-11-24 PROCEDURE — 97110 THERAPEUTIC EXERCISES: CPT

## 2021-11-24 PROCEDURE — 97112 NEUROMUSCULAR REEDUCATION: CPT

## 2021-11-24 NOTE — PROGRESS NOTES
Physical Therapy Treatment Note and 30 Day Progress Note    Patient: Miranda Elliott                                                                     Visit Date: 2021  :     1965    Referring practitioner:    Kevin Tovar MD  Date of Initial Visit:          Type: THERAPY  Noted: 9/3/2021    Patient seen for 25 sessions    Visit Diagnoses:    ICD-10-CM ICD-9-CM   1. Status post medial meniscus repair  Z98.890 V45.89   2. Gait disturbance  R26.9 781.2     SUBJECTIVE     Subjective  Last weekend she had 7-8/10 pain that took her breath away, along the anterior medial L knee. Has been better since, no pain today. She has mild discomfort in posterior L knee that she feels when flexing knee.     PAIN: 0/10      OBJECTIVE     Objective      Neuromuscular Reeducation     23060 Comments   NMES ([place]ayan) to L VMO preset 3- max amp 20 SLR x20 (L hip pain), SAQ for remainder         Timed Minutes 20        Therapeutic Exercises    55250 Comments   L knee active ROM     L step ups forward  2 x 10    L eccentric lateral lowering from step  2 x 10    Ambulation for endurance                 Timed Minutes 25     Therapy Education/Self Care 25180   Details: Encouraged ice tonight to decrease risk of DOMS. Recommended adding step ups to her HEP.    Given Home Exercise Program  symptom relief   Progress: Reinforced   Education provided to:  Patient   Level of understanding Verbalized   Timed Minutes      Access Code: [email or web address]  Date: 2021  Prepared by: Yesenia Lake    Exercises  Active Straight Leg Raise with Quad Set - 1 x daily - 7 x weekly - 3 sets - 10 reps  Sidelying Hip Adduction - 1 x daily - 7 x weekly - 3 sets - 10 reps  Sidelying Hip Abduction - 1 x daily - 7 x weekly - 3 sets - 10 reps  Prone Hip Extension - 1 x daily - 7 x weekly - 3 sets - 10 reps  Seated Long Arc Quad - 1-2 x daily - 7 x weekly - 2 sets - 10 reps  Mini Squat with  Counter Support - 1-2 x daily - 7 x weekly - 2 sets - 10 reps    Total Timed Treatment:     45  mins  Total Time of Visit:             45   mins         ASSESSMENT/PLAN     GOALS    Goals                                       Progress Note due by 12/24/21                                                         Recert due by 12/2/2021   STG by: 3 Weeks Comments Date Status   Patient will demonstrate 90 deg Active L knee ROM 90 deg actively today  9/10/21 MET   Patient will demonstrate L knee active extension to neutral  0 deg today  9/29/21 MET   Patient will demonstrate decreased L knee edema  No swelling noted today  9/29/21 MET    LTG by: 6 Weeks         Patient will demonstrate independence with comprehensive HEP She has been compliant, progressed today to include step ups 11/24/21 ongoing   Patient will demonstrate >125 deg active L knee flexion  126 deg today  11/24/21 MET   Patient will ambulate >250 ft with normal gait pattern  Returned to previous gait mechanics  11/24/21 MET   Patient will perform ascending/descending stairs reciprocally utilizing handrail as needed   Due to lack of L quad strength, difficulty performing reciprocal stairs  11/24/21 ongoing     Assessment & Plan     Assessment  Impairments: abnormal coordination, abnormal gait, abnormal muscle firing, abnormal or restricted ROM, impaired balance, impaired physical strength, lacks appropriate home exercise program, pain with function and weight-bearing intolerance  Functional Limitations: walking, uncomfortable because of pain, standing and stooping  Plan  Therapy options: will be seen for skilled therapy services  Planned modality interventions: low level laser therapy, dry needling, high voltage pulsed current (pain management) and cryotherapy  Planned therapy interventions: manual therapy, balance/weight-bearing training, neuromuscular re-education, orthotic fitting/training, postural training, soft tissue mobilization, spinal/joint  mobilization, strengthening, stretching, therapeutic activities, home exercise program, gait training, functional ROM exercises and flexibility  Frequency: 1x week  Duration in visits: 8  Duration in weeks: 8  Treatment plan discussed with: patient         ASSESSMENT: Mylene has progressed very well with therapy she has met all but 2 goals at this point. Primary limitation is decreased L quad strength, which is expected given her surgical procedure. We have been utilzing NMES to stimulate her VMO, she tolerates well and strength has improved. She is functionally able to perform all activities with minimal difficulty. She has two visits remaining, following those visits we will put her POC on hold.     PLAN: Review HEP and progress appropriately. Continue to focus on L quad strength.     Signature: Ann Marie Castañeda, PT, DPT License #: [id number]    Electronically Signed on 11/24/21          [address] [facility] [place] [address]  [phone]

## 2021-12-02 ENCOUNTER — TREATMENT (OUTPATIENT)
Dept: PHYSICAL THERAPY | Facility: CLINIC | Age: 56
End: 2021-12-02

## 2021-12-02 DIAGNOSIS — R26.9 GAIT DISTURBANCE: ICD-10-CM

## 2021-12-02 DIAGNOSIS — Z98.890 STATUS POST MEDIAL MENISCUS REPAIR: Primary | ICD-10-CM

## 2021-12-02 PROCEDURE — 97110 THERAPEUTIC EXERCISES: CPT

## 2021-12-02 NOTE — PROGRESS NOTES
Physical Therapy Treatment Note    Patient: Miranda Elliott                                                                     Visit Date: 2021  :     1965    Referring practitioner:    Kevin Tovar MD  Date of Initial Visit:          Type: THERAPY  Noted: 9/3/2021    Patient seen for 26 sessions    Visit Diagnoses:    ICD-10-CM ICD-9-CM   1. Status post medial meniscus repair  Z98.890 V45.89   2. Gait disturbance  R26.9 781.2     SUBJECTIVE     Subjective No complaints or changes since previous session. Feels she is ready for POC to be placed on hold.     PAIN: 0/10      OBJECTIVE     Objective      Therapeutic Exercises    60567 Comments   Squats with TRX straps  2 x 10; cue for improved form    L step ups forward  2 x 10    L eccentric lateral lowering from step  2 x 10    Ambulation for endurance, including up/down incline     BLE shuttle press, 6 cords X 20    Unilateral (LLE) shuttle press; 5 cords 2 x 10    Seated L hip IR/ER with red t band     Timed Minutes 40     Therapy Education/Self Care 80795   Details: Encouraged ice tonight to decrease risk of DOMS. Recommended adding step ups to her HEP.    Given Home Exercise Program  symptom relief   Progress: Reinforced   Education provided to:  Patient   Level of understanding Verbalized   Timed Minutes      Access Code: [email or web address]  Date: 2021  Prepared by: Yesenia Lake    Exercises  Active Straight Leg Raise with Quad Set - 1 x daily - 7 x weekly - 3 sets - 10 reps  Sidelying Hip Adduction - 1 x daily - 7 x weekly - 3 sets - 10 reps  Sidelying Hip Abduction - 1 x daily - 7 x weekly - 3 sets - 10 reps  Prone Hip Extension - 1 x daily - 7 x weekly - 3 sets - 10 reps  Seated Long Arc Quad - 1-2 x daily - 7 x weekly - 2 sets - 10 reps  Mini Squat with Counter Support - 1-2 x daily - 7 x weekly - 2 sets - 10 reps    Total Timed Treatment:     40  mins  Total Time of Visit:              40   mins         ASSESSMENT/PLAN     GOALS    Goals                                       Progress Note due by 12/24/21                                                         Recert due by 12/2/2021   STG by: 3 Weeks Comments Date Status   Patient will demonstrate 90 deg Active L knee ROM 90 deg actively today  9/10/21 MET   Patient will demonstrate L knee active extension to neutral  0 deg today  9/29/21 MET   Patient will demonstrate decreased L knee edema  No swelling noted today  9/29/21 MET    LTG by: 6 Weeks         Patient will demonstrate independence with comprehensive HEP She has been compliant, progressed today to include step ups 11/24/21 ongoing   Patient will demonstrate >125 deg active L knee flexion  126 deg today  11/24/21 MET   Patient will ambulate >250 ft with normal gait pattern  Returned to previous gait mechanics  11/24/21 MET   Patient will perform ascending/descending stairs reciprocally utilizing handrail as needed   Continued difficulty, however she will be able continue strengthening at home  12/2/21 ongoing     Assessment/Plan     ASSESSMENT: Continued stregthening this date and reviewed HEP to ensure proper performance at home. She was able to demonstrate good form with all exercises. She requested that today be last visit as she is very busy. Explained that there are goals she has not met, however I feel she will continue to be compliant with HEP to improve strength.     PLAN: POC on hold at this point, she will contact the office should needs arise     Signature: Ann Marie Castañeda, PT, DPT License #: [id number]    Electronically Signed on 12/2/2021          [address] [place] [address]. [address]  [phone]

## 2021-12-29 ENCOUNTER — TELEMEDICINE (OUTPATIENT)
Dept: OBSTETRICS AND GYNECOLOGY | Facility: CLINIC | Age: 56
End: 2021-12-29

## 2021-12-29 VITALS — WEIGHT: 228 LBS | BODY MASS INDEX: 36.64 KG/M2 | HEIGHT: 66 IN

## 2021-12-29 DIAGNOSIS — R63.5 WEIGHT GAIN: ICD-10-CM

## 2021-12-29 PROCEDURE — 99213 OFFICE O/P EST LOW 20 MIN: CPT | Performed by: NURSE PRACTITIONER

## 2021-12-29 RX ORDER — SEMAGLUTIDE 0.5 MG/.5ML
1 INJECTION, SOLUTION SUBCUTANEOUS WEEKLY
Qty: 2 ML | Refills: 3 | Status: SHIPPED | OUTPATIENT
Start: 2021-12-29 | End: 2022-02-08 | Stop reason: SDUPTHER

## 2021-12-29 NOTE — PROGRESS NOTES
"Subjective     Miranda Elliott is a 56 y.o. female    You have chosen to receive care through a telehealth visit.  Do you consent to use a video/audio connection for your medical care today? Yes    Here for follow up on weight loss. She has been on Wegovy for 1 month. She has lost 9 pounds. She has lost a total of 9 pounds. She has not had any side effects from the medication. She has been doing well with diet and exercise and has been drinking at least 64 ozs. of water daily. We discussed increasing Protein in her diet.          Ht 167.6 cm (66\")   Wt 103 kg (228 lb)   LMP  (LMP Unknown)   BMI 36.80 kg/m²     Outpatient Encounter Medications as of 12/29/2021   Medication Sig Dispense Refill   • ALPRAZolam (XANAX) 1 MG tablet Take 1 tablet by mouth 3 (Three) Times a Day As Needed for Anxiety. 90 tablet 0   • nystatin-triamcinolone (MYCOLOG) 499483-8.1 UNIT/GM-% ointment Apply 1 application topically to the appropriate area as directed 2 (Two) Times a Day. 30 g 3   • Semaglutide-Weight Management (Wegovy) 0.5 MG/0.5ML solution auto-injector Inject 1 dose (0.5mg) under the skin into the appropriate area as directed 1 (One) Time Per Week. 2 mL 3   • [DISCONTINUED] Semaglutide-Weight Management (semaglutide) 0.25 MG/0.5ML solution auto-injector Inject 0.25 mg under the skin into the appropriate area as directed 1 (One) Time Per Week. 2 mL 3     No facility-administered encounter medications on file as of 12/29/2021.       Surgical History  Past Surgical History:   Procedure Laterality Date   • ADENOIDECTOMY     • BACK SURGERY  1998    Lumbar gallagher   • BACK SURGERY  2012    lumbar fusion   • COLONOSCOPY  2012   • ENDOMETRIAL ABLATION     • KNEE ARTHROSCOPY Left 7/22/2021    Procedure: LEFT KNEE ARTHROSCOPIC PARTIAL MEDIAL MENISECTOMY;  Surgeon: Kevin Tovar MD;  Location: [facility];  Service: Orthopedics;  Laterality: Left;   • SALPINGO OOPHORECTOMY Right    • TONSILLECTOMY         Family History  Family History "   Problem Relation Age of Onset   • Diabetes Father    • No Known Problems Mother    • No Known Problems Sister    • Prostate cancer Maternal Grandfather    • Breast cancer Neg Hx    • Ovarian cancer Neg Hx    • Uterine cancer Neg Hx    • Colon cancer Neg Hx    • Melanoma Neg Hx        The following portions of the patient's history were reviewed and updated as appropriate: allergies, current medications, past family history, past medical history, past social history, past surgical history and problem list.    Review of Systems   Constitutional: Negative for activity change, appetite change, chills, diaphoresis, fatigue, fever, unexpected weight gain and unexpected weight loss.   HENT: Negative for congestion, dental problem, drooling, ear discharge, ear pain, facial swelling, hearing loss, mouth sores, nosebleeds, postnasal drip, rhinorrhea, sinus pressure, sneezing, sore throat, swollen glands, tinnitus, trouble swallowing and voice change.    Eyes: Negative for blurred vision, double vision, photophobia, pain, discharge, redness, itching and visual disturbance.   Respiratory: Negative for apnea, cough, choking, chest tightness, shortness of breath, wheezing and stridor.    Cardiovascular: Negative for chest pain, palpitations and leg swelling.   Gastrointestinal: Negative for abdominal distention, abdominal pain, anal bleeding, blood in stool, constipation, diarrhea, nausea, rectal pain, vomiting, GERD and indigestion.   Endocrine: Negative for cold intolerance, heat intolerance, polydipsia, polyphagia and polyuria.   Genitourinary: Negative for amenorrhea, breast discharge, breast lump, breast pain, decreased libido, decreased urine volume, difficulty urinating, dyspareunia, dysuria, flank pain, frequency, genital sores, hematuria, menstrual problem, pelvic pain, pelvic pressure, urgency, urinary incontinence, vaginal bleeding, vaginal discharge and vaginal pain.   Musculoskeletal: Negative for arthralgias, back  pain, gait problem, joint swelling, myalgias, neck pain, neck stiffness and bursitis.   Skin: Negative for color change, dry skin and rash.   Allergic/Immunologic: Negative for environmental allergies, food allergies and immunocompromised state.   Neurological: Negative for dizziness, tremors, seizures, syncope, facial asymmetry, speech difficulty, weakness, light-headedness, numbness, headache, memory problem and confusion.   Hematological: Negative for adenopathy. Does not bruise/bleed easily.   Psychiatric/Behavioral: Negative for agitation, behavioral problems, decreased concentration, dysphoric mood, hallucinations, self-injury, sleep disturbance, suicidal ideas, negative for hyperactivity, depressed mood and stress. The patient is not nervous/anxious.        Objective   Physical Exam  Vitals and nursing note reviewed.   Constitutional:       Appearance: She is well-developed.   HENT:      Head: Normocephalic and atraumatic.   Eyes:      General:         Right eye: No discharge.         Left eye: No discharge.      Conjunctiva/sclera: Conjunctivae normal.   Neck:      Thyroid: No thyromegaly.   Pulmonary:      Effort: Pulmonary effort is normal.   Musculoskeletal:         General: Normal range of motion.      Cervical back: Normal range of motion.   Skin:     General: Skin is warm and dry.   Neurological:      Mental Status: She is alert and oriented to person, place, and time.   Psychiatric:         Mood and Affect: Mood normal.         Behavior: Behavior normal.         Thought Content: Thought content normal.         Judgment: Judgment normal.         Assessment/Plan   Diagnoses and all orders for this visit:    1. Weight gain  Comments:  This is her 1st month on Wegovy. She has lost 9 pounds. Will continue and increase to 0.5mg.  Orders:  -     Semaglutide-Weight Management (Wegovy) 0.5 MG/0.5ML solution auto-injector; Inject 1 dose (0.5mg) under the skin into the appropriate area as directed 1 (One) Time  Per Week.  Dispense: 2 mL; Refill: 3     This was an audio and video enabled telemedicine encounter.      Patient's Body mass index is 36.8 kg/m². indicating that she is obese (BMI >30). Obesity-related health conditions include the following: none. Obesity is improving with treatment. BMI is is above average; BMI management plan is completed. We discussed portion control and increasing exercise..      Luzmaria Calhoun, APRN  12/29/2021

## 2022-01-25 ENCOUNTER — HOSPITAL ENCOUNTER (OUTPATIENT)
Dept: MAMMOGRAPHY | Facility: HOSPITAL | Age: 57
Discharge: HOME | End: 2022-01-25

## 2022-01-25 ENCOUNTER — HOSPITAL ENCOUNTER (OUTPATIENT)
Dept: BONE DENSITY | Facility: HOSPITAL | Age: 57
Discharge: HOME | End: 2022-01-25

## 2022-01-25 DIAGNOSIS — Z12.31 ENCOUNTER FOR SCREENING MAMMOGRAM FOR BREAST CANCER: ICD-10-CM

## 2022-01-25 DIAGNOSIS — Z13.820 ENCOUNTER FOR SCREENING FOR OSTEOPOROSIS: ICD-10-CM

## 2022-01-25 PROCEDURE — 77080 DXA BONE DENSITY AXIAL: CPT

## 2022-01-25 PROCEDURE — 77067 SCR MAMMO BI INCL CAD: CPT

## 2022-01-25 PROCEDURE — 77063 BREAST TOMOSYNTHESIS BI: CPT

## 2022-01-26 ENCOUNTER — DOCUMENTATION (OUTPATIENT)
Dept: MAMMOGRAPHY | Facility: HOSPITAL | Age: 57
End: 2022-01-26

## 2022-01-26 NOTE — PROGRESS NOTES
As a part of our screening program, the patient was sent a risk assessment tool to determine risk for developing hereditary cancers.  The patient met NCCN criteria for testing, however declined genetic testing at this time.

## 2022-01-27 ENCOUNTER — TELEPHONE (OUTPATIENT)
Dept: OBSTETRICS AND GYNECOLOGY | Facility: CLINIC | Age: 57
End: 2022-01-27

## 2022-01-27 NOTE — TELEPHONE ENCOUNTER
Pt had questions regarding Wegovy injections.  [facility] pharmacist called and confirmed pt may take 2 0.25mg injections to equal her 0.5mg dose.  Pt voiced understanding.

## 2022-02-08 ENCOUNTER — OFFICE VISIT (OUTPATIENT)
Dept: OBSTETRICS AND GYNECOLOGY | Facility: CLINIC | Age: 57
End: 2022-02-08

## 2022-02-08 VITALS
DIASTOLIC BLOOD PRESSURE: 76 MMHG | HEIGHT: 66 IN | WEIGHT: 219 LBS | BODY MASS INDEX: 35.2 KG/M2 | SYSTOLIC BLOOD PRESSURE: 108 MMHG

## 2022-02-08 DIAGNOSIS — R63.5 WEIGHT GAIN: ICD-10-CM

## 2022-02-08 PROCEDURE — 99213 OFFICE O/P EST LOW 20 MIN: CPT | Performed by: NURSE PRACTITIONER

## 2022-02-08 RX ORDER — SEMAGLUTIDE 0.5 MG/.5ML
1 INJECTION, SOLUTION SUBCUTANEOUS WEEKLY
Qty: 2 ML | Refills: 3 | Status: SHIPPED | OUTPATIENT
Start: 2022-02-08 | End: 2022-02-08

## 2022-02-08 RX ORDER — SEMAGLUTIDE 1.34 MG/ML
0.5 INJECTION, SOLUTION SUBCUTANEOUS WEEKLY
Qty: 5 PEN | Refills: 3 | Status: SHIPPED | OUTPATIENT
Start: 2022-02-08 | End: 2022-02-10 | Stop reason: SDUPTHER

## 2022-02-08 NOTE — PROGRESS NOTES
"Subjective     Miranda Elliott is a 56 y.o. female    Here for follow up on weight loss. She has been on Wegovy for  month. She has lost 9 pounds. She has lost a total of 17 pounds. She has not had any side effects from the medication. She has been doing well with diet and exercise and has been drinking at least 64 ozs. of water daily. We discussed increasing Protein in her diet.          /76   Ht 167.6 cm (65.98\")   Wt 99.3 kg (219 lb)   LMP  (LMP Unknown)   BMI 35.36 kg/m²     Outpatient Encounter Medications as of 2/8/2022   Medication Sig Dispense Refill   • ALPRAZolam (XANAX) 1 MG tablet Take 1 tablet by mouth 3 (Three) Times a Day As Needed for Anxiety. 90 tablet 0   • nystatin-triamcinolone (MYCOLOG) 467962-9.1 UNIT/GM-% ointment Apply 1 application topically to the appropriate area as directed 2 (Two) Times a Day. 30 g 3   • Semaglutide-Weight Management (Wegovy) 0.5 MG/0.5ML solution auto-injector Inject 1 dose (0.5mg) under the skin into the appropriate area as directed 1 (One) Time Per Week. 2 mL 3   • [DISCONTINUED] Semaglutide-Weight Management (Wegovy) 0.5 MG/0.5ML solution auto-injector Inject 1 dose (0.5mg) under the skin into the appropriate area as directed 1 (One) Time Per Week. 2 mL 3     No facility-administered encounter medications on file as of 2/8/2022.       Surgical History  Past Surgical History:   Procedure Laterality Date   • ADENOIDECTOMY     • BACK SURGERY  1998    Lumbar gallagher   • BACK SURGERY  2012    lumbar fusion   • COLONOSCOPY  2012   • ENDOMETRIAL ABLATION     • KNEE ARTHROSCOPY Left 7/22/2021    Procedure: LEFT KNEE ARTHROSCOPIC PARTIAL MEDIAL MENISECTOMY;  Surgeon: Kevin Tovar MD;  Location: [facility];  Service: Orthopedics;  Laterality: Left;   • SALPINGO OOPHORECTOMY Right    • TONSILLECTOMY         Family History  Family History   Problem Relation Age of Onset   • Diabetes Father    • No Known Problems Mother    • No Known Problems Sister    • Prostate cancer " Maternal Grandfather    • Breast cancer Neg Hx    • Ovarian cancer Neg Hx    • Uterine cancer Neg Hx    • Colon cancer Neg Hx    • Melanoma Neg Hx        The following portions of the patient's history were reviewed and updated as appropriate: allergies, current medications, past family history, past medical history, past social history, past surgical history, and problem list.    Review of Systems   Constitutional: Negative for activity change, appetite change, chills, diaphoresis, fatigue, fever, unexpected weight gain and unexpected weight loss.   HENT: Negative for congestion, dental problem, drooling, ear discharge, ear pain, facial swelling, hearing loss, mouth sores, nosebleeds, postnasal drip, rhinorrhea, sinus pressure, sneezing, sore throat, swollen glands, tinnitus, trouble swallowing and voice change.    Eyes: Negative for blurred vision, double vision, photophobia, pain, discharge, redness, itching and visual disturbance.   Respiratory: Negative for apnea, cough, choking, chest tightness, shortness of breath, wheezing and stridor.    Cardiovascular: Negative for chest pain, palpitations and leg swelling.   Gastrointestinal: Negative for abdominal distention, abdominal pain, anal bleeding, blood in stool, constipation, diarrhea, nausea, rectal pain, vomiting, GERD and indigestion.   Endocrine: Negative for cold intolerance, heat intolerance, polydipsia, polyphagia and polyuria.   Genitourinary: Negative for amenorrhea, breast discharge, breast lump, breast pain, decreased libido, decreased urine volume, difficulty urinating, dyspareunia, dysuria, flank pain, frequency, genital sores, hematuria, menstrual problem, pelvic pain, pelvic pressure, urgency, urinary incontinence, vaginal bleeding, vaginal discharge and vaginal pain.   Musculoskeletal: Negative for arthralgias, back pain, gait problem, joint swelling, myalgias, neck pain, neck stiffness and bursitis.   Skin: Negative for color change, dry skin  and rash.   Allergic/Immunologic: Negative for environmental allergies, food allergies and immunocompromised state.   Neurological: Negative for dizziness, tremors, seizures, syncope, facial asymmetry, speech difficulty, weakness, light-headedness, numbness, headache, memory problem and confusion.   Hematological: Negative for adenopathy. Does not bruise/bleed easily.   Psychiatric/Behavioral: Negative for agitation, behavioral problems, decreased concentration, dysphoric mood, hallucinations, self-injury, sleep disturbance, suicidal ideas, negative for hyperactivity, depressed mood and stress. The patient is not nervous/anxious.        Objective   Physical Exam  Vitals and nursing note reviewed.   Constitutional:       Appearance: She is well-developed.   HENT:      Head: Normocephalic and atraumatic.   Eyes:      General:         Right eye: No discharge.         Left eye: No discharge.      Conjunctiva/sclera: Conjunctivae normal.   Neck:      Thyroid: No thyromegaly.   Cardiovascular:      Rate and Rhythm: Normal rate and regular rhythm.      Heart sounds: Normal heart sounds.   Pulmonary:      Effort: Pulmonary effort is normal.      Breath sounds: Normal breath sounds.   Musculoskeletal:         General: Normal range of motion.      Cervical back: Normal range of motion and neck supple.   Skin:     General: Skin is warm and dry.   Neurological:      Mental Status: She is alert and oriented to person, place, and time.   Psychiatric:         Mood and Affect: Mood normal.         Behavior: Behavior normal.         Thought Content: Thought content normal.         Judgment: Judgment normal.         Assessment/Plan   Diagnoses and all orders for this visit:    1. Weight gain  Comments:  This is her 2nd month on Wegovy. She has lost 9 pounds. She has lost 17 pounds total. Will continue 0.5mg.  Orders:  -     Semaglutide-Weight Management (Wegovy) 0.5 MG/0.5ML solution auto-injector; Inject 1 dose (0.5mg) under the  skin into the appropriate area as directed 1 (One) Time Per Week.  Dispense: 2 mL; Refill: 3         Patient's Body mass index is 35.36 kg/m². indicating that she is obese (BMI >30). Obesity-related health conditions include the following: none. Obesity is improving with treatment. BMI is is above average; BMI management plan is completed. We discussed portion control and increasing exercise..      Luzmaria Calhoun, APRN  2/8/2022

## 2022-02-10 ENCOUNTER — TELEPHONE (OUTPATIENT)
Dept: OBSTETRICS AND GYNECOLOGY | Facility: CLINIC | Age: 57
End: 2022-02-10

## 2022-02-10 RX ORDER — SEMAGLUTIDE 1 MG/.5ML
1 INJECTION, SOLUTION SUBCUTANEOUS WEEKLY
Qty: 2 ML | Refills: 3 | Status: SHIPPED | OUTPATIENT
Start: 2022-02-10 | End: 2022-05-11

## 2022-02-10 RX ORDER — SEMAGLUTIDE 1.34 MG/ML
0.5 INJECTION, SOLUTION SUBCUTANEOUS WEEKLY
Qty: 5 PEN | Refills: 3 | Status: SHIPPED | OUTPATIENT
Start: 2022-02-10 | End: 2022-02-10

## 2022-02-10 NOTE — TELEPHONE ENCOUNTER
Pt oxempic not covered unless written to titrate/increase.  Medication covered when written to titrate up.  Pt updated by [employer].

## 2022-02-10 NOTE — TELEPHONE ENCOUNTER
PA denied for Ozempic. Denial letter stated that the diagnosis of weight loss was not an acceptable diagnosis for the Ozempic.

## 2022-03-08 ENCOUNTER — OFFICE VISIT (OUTPATIENT)
Dept: OBSTETRICS AND GYNECOLOGY | Facility: CLINIC | Age: 57
End: 2022-03-08

## 2022-03-08 VITALS
SYSTOLIC BLOOD PRESSURE: 108 MMHG | HEIGHT: 66 IN | DIASTOLIC BLOOD PRESSURE: 66 MMHG | WEIGHT: 213 LBS | BODY MASS INDEX: 34.23 KG/M2

## 2022-03-08 DIAGNOSIS — E55.9 VITAMIN D DEFICIENCY: ICD-10-CM

## 2022-03-08 DIAGNOSIS — R10.13 EPIGASTRIC PAIN: ICD-10-CM

## 2022-03-08 DIAGNOSIS — R63.5 WEIGHT GAIN: Primary | ICD-10-CM

## 2022-03-08 PROCEDURE — 99213 OFFICE O/P EST LOW 20 MIN: CPT | Performed by: NURSE PRACTITIONER

## 2022-03-08 NOTE — PROGRESS NOTES
"Subjective     Miranda Elliott is a 56 y.o. female    Here for follow up on weight loss. She has been on Wegovy for 6 months. She has lost 6 pounds. She has lost a total of 24 pounds total. She has not had any side effects from the medication. She has been doing well with diet and exercise and has been drinking at least 64 ozs. of water daily. We discussed increasing Protein in her diet.          /66   Ht 167.6 cm (65.98\")   Wt 96.6 kg (213 lb)   LMP  (LMP Unknown)   BMI 34.40 kg/m²     Outpatient Encounter Medications as of 3/8/2022   Medication Sig Dispense Refill   • ALPRAZolam (XANAX) 1 MG tablet Take 1 tablet by mouth 3 (Three) Times a Day As Needed for Anxiety. 90 tablet 0   • nystatin-triamcinolone (MYCOLOG) 313480-0.1 UNIT/GM-% ointment Apply 1 application topically to the appropriate area as directed 2 (Two) Times a Day. 30 g 3   • Semaglutide-Weight Management (Wegovy) 1 MG/0.5ML solution auto-injector Inject 1 dose under the skin into the appropriate area as directed 1 (One) Time Per Week. 2 mL 3     No facility-administered encounter medications on file as of 3/8/2022.       Surgical History  Past Surgical History:   Procedure Laterality Date   • ADENOIDECTOMY     • BACK SURGERY  1998    Lumbar gallagher   • BACK SURGERY  2012    lumbar fusion   • COLONOSCOPY  2012   • ENDOMETRIAL ABLATION     • KNEE ARTHROSCOPY Left 7/22/2021    Procedure: LEFT KNEE ARTHROSCOPIC PARTIAL MEDIAL MENISECTOMY;  Surgeon: Kevin Tovar MD;  Location: [facility];  Service: Orthopedics;  Laterality: Left;   • SALPINGO OOPHORECTOMY Right    • TONSILLECTOMY         Family History  Family History   Problem Relation Age of Onset   • Diabetes Father    • No Known Problems Mother    • No Known Problems Sister    • Prostate cancer Maternal Grandfather    • Breast cancer Neg Hx    • Ovarian cancer Neg Hx    • Uterine cancer Neg Hx    • Colon cancer Neg Hx    • Melanoma Neg Hx        The following portions of the patient's history " were reviewed and updated as appropriate: allergies, current medications, past family history, past medical history, past social history, past surgical history, and problem list.    Review of Systems   Constitutional: Negative for activity change, appetite change, chills, diaphoresis, fatigue, fever, unexpected weight gain and unexpected weight loss.   HENT: Negative for congestion, dental problem, drooling, ear discharge, ear pain, facial swelling, hearing loss, mouth sores, nosebleeds, postnasal drip, rhinorrhea, sinus pressure, sneezing, sore throat, swollen glands, tinnitus, trouble swallowing and voice change.    Eyes: Negative for blurred vision, double vision, photophobia, pain, discharge, redness, itching and visual disturbance.   Respiratory: Negative for apnea, cough, choking, chest tightness, shortness of breath, wheezing and stridor.    Cardiovascular: Negative for chest pain, palpitations and leg swelling.   Gastrointestinal: Negative for abdominal distention, abdominal pain, anal bleeding, blood in stool, constipation, diarrhea, nausea, rectal pain, vomiting, GERD and indigestion.   Endocrine: Negative for cold intolerance, heat intolerance, polydipsia, polyphagia and polyuria.   Genitourinary: Negative for amenorrhea, breast discharge, breast lump, breast pain, decreased libido, decreased urine volume, difficulty urinating, dyspareunia, dysuria, flank pain, frequency, genital sores, hematuria, menstrual problem, pelvic pain, pelvic pressure, urgency, urinary incontinence, vaginal bleeding, vaginal discharge and vaginal pain.   Musculoskeletal: Negative for arthralgias, back pain, gait problem, joint swelling, myalgias, neck pain, neck stiffness and bursitis.   Skin: Negative for color change, dry skin and rash.   Allergic/Immunologic: Negative for environmental allergies, food allergies and immunocompromised state.   Neurological: Negative for dizziness, tremors, seizures, syncope, facial asymmetry,  speech difficulty, weakness, light-headedness, numbness, headache, memory problem and confusion.   Hematological: Negative for adenopathy. Does not bruise/bleed easily.   Psychiatric/Behavioral: Negative for agitation, behavioral problems, decreased concentration, dysphoric mood, hallucinations, self-injury, sleep disturbance, suicidal ideas, negative for hyperactivity, depressed mood and stress. The patient is not nervous/anxious.        Objective   Physical Exam  Vitals and nursing note reviewed.   Constitutional:       Appearance: She is well-developed.   HENT:      Head: Normocephalic and atraumatic.   Eyes:      General:         Right eye: No discharge.         Left eye: No discharge.      Conjunctiva/sclera: Conjunctivae normal.   Neck:      Thyroid: No thyromegaly.   Cardiovascular:      Rate and Rhythm: Normal rate and regular rhythm.      Heart sounds: Normal heart sounds.   Pulmonary:      Effort: Pulmonary effort is normal.      Breath sounds: Normal breath sounds.   Musculoskeletal:         General: Normal range of motion.      Cervical back: Normal range of motion and neck supple.   Skin:     General: Skin is warm and dry.   Neurological:      Mental Status: She is alert and oriented to person, place, and time.   Psychiatric:         Mood and Affect: Mood normal.         Behavior: Behavior normal.         Thought Content: Thought content normal.         Judgment: Judgment normal.         Assessment/Plan   Diagnoses and all orders for this visit:    1. Weight gain (Primary)  Comments:  Patient has been on Wegovy for 6 months.  She has lost 6 pounds this month for a total of 24 pounds.    2. Vitamin D deficiency  Comments:  Patient is given a refill on vitamin D.    3. Epigastric pain  Comments:  Is having some epigastric pain.  She is offered an ultrasound but wishes to wait at this time.         Patient's Body mass index is 34.4 kg/m². indicating that she is obese (BMI >30). Obesity-related health  conditions include the following: none. Obesity is improving with treatment. BMI is is above average; BMI management plan is completed. We discussed portion control and increasing exercise..      Luzmaria Calhoun, APRN  3/8/2022

## 2022-04-04 DIAGNOSIS — R10.11 RIGHT UPPER QUADRANT ABDOMINAL PAIN: Primary | ICD-10-CM

## 2022-04-19 ENCOUNTER — HOSPITAL ENCOUNTER (OUTPATIENT)
Dept: ULTRASOUND IMAGING | Facility: HOSPITAL | Age: 57
Discharge: HOME | End: 2022-04-19
Admitting: NURSE PRACTITIONER

## 2022-04-19 DIAGNOSIS — K80.20 GALLSTONES: Primary | ICD-10-CM

## 2022-04-19 DIAGNOSIS — R10.11 RIGHT UPPER QUADRANT ABDOMINAL PAIN: ICD-10-CM

## 2022-04-19 PROCEDURE — 76705 ECHO EXAM OF ABDOMEN: CPT

## 2022-05-09 ENCOUNTER — TRANSCRIBE ORDERS (OUTPATIENT)
Dept: LAB | Facility: HOSPITAL | Age: 57
End: 2022-05-09

## 2022-05-09 DIAGNOSIS — Z11.59 SCREENING FOR VIRAL DISEASE: Primary | ICD-10-CM

## 2022-05-11 ENCOUNTER — PRE-ADMISSION TESTING (OUTPATIENT)
Dept: PREADMISSION TESTING | Facility: HOSPITAL | Age: 57
End: 2022-05-11

## 2022-05-11 ENCOUNTER — HOSPITAL ENCOUNTER (OUTPATIENT)
Dept: GENERAL RADIOLOGY | Facility: HOSPITAL | Age: 57
Discharge: HOME | End: 2022-05-11

## 2022-05-11 VITALS
SYSTOLIC BLOOD PRESSURE: 119 MMHG | RESPIRATION RATE: 18 BRPM | BODY MASS INDEX: 32.95 KG/M2 | OXYGEN SATURATION: 98 % | DIASTOLIC BLOOD PRESSURE: 55 MMHG | WEIGHT: 205.03 LBS | HEART RATE: 78 BPM | HEIGHT: 66 IN

## 2022-05-11 PROCEDURE — 93005 ELECTROCARDIOGRAM TRACING: CPT

## 2022-05-11 PROCEDURE — 93010 ELECTROCARDIOGRAM REPORT: CPT | Performed by: INTERNAL MEDICINE

## 2022-05-11 PROCEDURE — 71045 X-RAY EXAM CHEST 1 VIEW: CPT

## 2022-05-11 RX ORDER — CETIRIZINE HYDROCHLORIDE 10 MG/1
10 TABLET ORAL DAILY PRN
COMMUNITY
End: 2023-03-28

## 2022-05-11 NOTE — DISCHARGE INSTRUCTIONS
Before you come to the hospital        Arrival time: AS DIRECTED BY OFFICE     YOU MAY TAKE THE FOLLOWING MEDICATION(S) THE MORNING OF SURGERY WITH A SIP OF WATER: none           ALL OTHER HOME MEDICATION CHECK WITH YOUR PHYSICIAN (especially if you are taking diabetes medicines or blood thinners)        If you were given and instructed to use a germ- killing soap, use as directed the night before surgery and the morning of surgery before coming to the hospital.             Eating and drinking restrictions prior to scheduled arrival time    2 Hours before arrival time STOP   Drinking Clear liquids (water, apple juice-no pulp)     6 Hours before arrival time STOP   Milk or drinks that contain milk, full liquids    6 Hours before arrival time STOP   Light meals or foods, such as toast or cereal    8 Hours before arrival time STOP   Heavy foods, such as meat, fried foods, or fatty foods    (It is extremely important that you follow these guidelines to prevent delay or cancelation of your procedure)     Clear Liquids  Water and flavored water                                                                      Clear Fruit juices, such as cranberry juice and apple juice.  Black coffee (NO cream of any kind, including powdered).  Plain tea  Clear bouillon or broth.  Flavored gelatin.  Soda.  Gatorade or Powerade.  Full liquid examples  Juices that have pulp.  Frozen ice pops that contain fruit pieces.  Coffee with creamer  Milk.  Yogurt.              MANAGING PAIN AFTER SURGERY    We know you are probably wondering what your pain will be like after surgery.  Following surgery it is unrealistic to expect you will not have pain.   Pain is how our bodies let us know that something is wrong or cautions us to be careful.  That said, our goal is to make your pain tolerable.    Methods we may use to treat your pain include (oral or IV medications, PCAs, epidurals, nerve blocks, etc.)   While some procedures require IV pain  medications for a short time after surgery, transitioning to pain medications by mouth allows for better management of pain.   Your nurse will encourage you to take oral pain medications whenever possible.  IV medications work almost immediately, but only last a short while.  Taking medications by mouth allows for a more constant level of medication in your blood stream for a longer period of time.      Once your pain is out of control it is harder to get back under control.  It is important you are aware when your next dose of pain medication is due.  If you are admitted, your nurse may write the time of your next dose on the white board in your room to help you remember.      We are interested in your pain and encourage you to inform us about aggravating factors during your visit.   Many times a simple repositioning every few hours can make a big difference.    If your physician says it is okay, do not let your pain prevent you from getting out of bed. Be sure to call your nurse for assistance prior to getting up so you do not fall.      Before surgery, please decide your tolerable pain goal.  These faces help describe the pain ratings we use on a 0-10 scale.   Be prepared to tell us your goal and whether or not you take pain or anxiety medications at home.

## 2022-05-12 ENCOUNTER — LAB (OUTPATIENT)
Dept: LAB | Facility: HOSPITAL | Age: 57
End: 2022-05-12

## 2022-05-12 LAB
ALBUMIN SERPL-MCNC: 4.2 G/DL (ref 3.5–5.2)
ALBUMIN/GLOB SERPL: 1.9 G/DL
ALP SERPL-CCNC: 95 U/L (ref 39–117)
ALT SERPL W P-5'-P-CCNC: 19 U/L (ref 1–33)
ANION GAP SERPL CALCULATED.3IONS-SCNC: 10 MMOL/L (ref 5–15)
AST SERPL-CCNC: 14 U/L (ref 1–32)
BASOPHILS # BLD AUTO: 0.04 10*3/MM3 (ref 0–0.2)
BASOPHILS NFR BLD AUTO: 0.5 % (ref 0–1.5)
BILIRUB SERPL-MCNC: 0.4 MG/DL (ref 0–1.2)
BUN SERPL-MCNC: 10 MG/DL (ref 6–20)
BUN/CREAT SERPL: 13 (ref 7–25)
CALCIUM SPEC-SCNC: 9.2 MG/DL (ref 8.6–10.5)
CHLORIDE SERPL-SCNC: 105 MMOL/L (ref 98–107)
CO2 SERPL-SCNC: 27 MMOL/L (ref 22–29)
CREAT SERPL-MCNC: 0.77 MG/DL (ref 0.57–1)
DEPRECATED RDW RBC AUTO: 44.9 FL (ref 37–54)
EGFRCR SERPLBLD CKD-EPI 2021: 90.7 ML/MIN/1.73
EOSINOPHIL # BLD AUTO: 0.12 10*3/MM3 (ref 0–0.4)
EOSINOPHIL NFR BLD AUTO: 1.6 % (ref 0.3–6.2)
ERYTHROCYTE [DISTWIDTH] IN BLOOD BY AUTOMATED COUNT: 13.2 % (ref 12.3–15.4)
GLOBULIN UR ELPH-MCNC: 2.2 GM/DL
GLUCOSE SERPL-MCNC: 105 MG/DL (ref 65–99)
HCT VFR BLD AUTO: 47.8 % (ref 34–46.6)
HGB BLD-MCNC: 15.4 G/DL (ref 12–15.9)
IMM GRANULOCYTES # BLD AUTO: 0.02 10*3/MM3 (ref 0–0.05)
IMM GRANULOCYTES NFR BLD AUTO: 0.3 % (ref 0–0.5)
LYMPHOCYTES # BLD AUTO: 1.58 10*3/MM3 (ref 0.7–3.1)
LYMPHOCYTES NFR BLD AUTO: 21.2 % (ref 19.6–45.3)
MCH RBC QN AUTO: 29.5 PG (ref 26.6–33)
MCHC RBC AUTO-ENTMCNC: 32.2 G/DL (ref 31.5–35.7)
MCV RBC AUTO: 91.6 FL (ref 79–97)
MONOCYTES # BLD AUTO: 0.42 10*3/MM3 (ref 0.1–0.9)
MONOCYTES NFR BLD AUTO: 5.6 % (ref 5–12)
NEUTROPHILS NFR BLD AUTO: 5.29 10*3/MM3 (ref 1.7–7)
NEUTROPHILS NFR BLD AUTO: 70.8 % (ref 42.7–76)
NRBC BLD AUTO-RTO: 0 /100 WBC (ref 0–0.2)
PLATELET # BLD AUTO: 265 10*3/MM3 (ref 140–450)
PMV BLD AUTO: 8.7 FL (ref 6–12)
POTASSIUM SERPL-SCNC: 4.1 MMOL/L (ref 3.5–5.2)
PROT SERPL-MCNC: 6.4 G/DL (ref 6–8.5)
QT INTERVAL: 398 MS
QTC INTERVAL: 410 MS
RBC # BLD AUTO: 5.22 10*6/MM3 (ref 3.77–5.28)
SODIUM SERPL-SCNC: 142 MMOL/L (ref 136–145)
WBC NRBC COR # BLD: 7.47 10*3/MM3 (ref 3.4–10.8)

## 2022-05-12 PROCEDURE — 80053 COMPREHEN METABOLIC PANEL: CPT

## 2022-05-12 PROCEDURE — 85025 COMPLETE CBC W/AUTO DIFF WBC: CPT

## 2022-05-12 PROCEDURE — 36415 COLL VENOUS BLD VENIPUNCTURE: CPT

## 2022-05-14 LAB — SARS-COV-2 RNA PNL SPEC NAA+PROBE: NOT DETECTED

## 2022-05-14 PROCEDURE — 87635 SARS-COV-2 COVID-19 AMP PRB: CPT | Performed by: SPECIALIST

## 2022-05-16 ENCOUNTER — HOSPITAL ENCOUNTER (OUTPATIENT)
Facility: HOSPITAL | Age: 57
Setting detail: HOSPITAL OUTPATIENT SURGERY
Discharge: HOME | End: 2022-05-16
Attending: SPECIALIST | Admitting: SPECIALIST

## 2022-05-16 ENCOUNTER — ANESTHESIA (OUTPATIENT)
Dept: PERIOP | Facility: HOSPITAL | Age: 57
End: 2022-05-16

## 2022-05-16 ENCOUNTER — ANESTHESIA EVENT (OUTPATIENT)
Dept: PERIOP | Facility: HOSPITAL | Age: 57
End: 2022-05-16

## 2022-05-16 VITALS
HEART RATE: 48 BPM | TEMPERATURE: 98.1 F | OXYGEN SATURATION: 98 % | RESPIRATION RATE: 16 BRPM | SYSTOLIC BLOOD PRESSURE: 90 MMHG | DIASTOLIC BLOOD PRESSURE: 61 MMHG

## 2022-05-16 DIAGNOSIS — K80.20 GALLBLADDER CALCULUS WITHOUT CHOLECYSTITIS AND NO OBSTRUCTION: ICD-10-CM

## 2022-05-16 PROCEDURE — 88304 TISSUE EXAM BY PATHOLOGIST: CPT | Performed by: SPECIALIST

## 2022-05-16 PROCEDURE — 25010000002 DEXAMETHASONE PER 1 MG: Performed by: NURSE ANESTHETIST, CERTIFIED REGISTERED

## 2022-05-16 PROCEDURE — 25010000002 PROPOFOL 10 MG/ML EMULSION: Performed by: NURSE ANESTHETIST, CERTIFIED REGISTERED

## 2022-05-16 PROCEDURE — 25010000002 CEFAZOLIN PER 500 MG: Performed by: SPECIALIST

## 2022-05-16 PROCEDURE — 25010000002 MIDAZOLAM PER 1 MG: Performed by: ANESTHESIOLOGY

## 2022-05-16 PROCEDURE — 25010000002 FENTANYL CITRATE (PF) 250 MCG/5ML SOLUTION: Performed by: NURSE ANESTHETIST, CERTIFIED REGISTERED

## 2022-05-16 PROCEDURE — 25010000002 DROPERIDOL PER 5 MG: Performed by: ANESTHESIOLOGY

## 2022-05-16 PROCEDURE — 25010000002 ONDANSETRON PER 1 MG: Performed by: NURSE ANESTHETIST, CERTIFIED REGISTERED

## 2022-05-16 PROCEDURE — 25010000002 FENTANYL CITRATE (PF) 50 MCG/ML SOLUTION: Performed by: ANESTHESIOLOGY

## 2022-05-16 DEVICE — LIGACLIP 10-M/L, 10MM ENDOSCOPIC ROTATING MULTIPLE CLIP APPLIERS
Type: IMPLANTABLE DEVICE | Site: ABDOMEN | Status: FUNCTIONAL
Brand: LIGACLIP

## 2022-05-16 RX ORDER — SODIUM CHLORIDE 0.9 % (FLUSH) 0.9 %
3 SYRINGE (ML) INJECTION AS NEEDED
Status: DISCONTINUED | OUTPATIENT
Start: 2022-05-16 | End: 2022-05-16 | Stop reason: HOSPADM

## 2022-05-16 RX ORDER — DEXAMETHASONE SODIUM PHOSPHATE 4 MG/ML
INJECTION, SOLUTION INTRA-ARTICULAR; INTRALESIONAL; INTRAMUSCULAR; INTRAVENOUS; SOFT TISSUE AS NEEDED
Status: DISCONTINUED | OUTPATIENT
Start: 2022-05-16 | End: 2022-05-16 | Stop reason: SURG

## 2022-05-16 RX ORDER — ONDANSETRON 2 MG/ML
4 INJECTION INTRAMUSCULAR; INTRAVENOUS ONCE AS NEEDED
Status: DISCONTINUED | OUTPATIENT
Start: 2022-05-16 | End: 2022-05-16 | Stop reason: HOSPADM

## 2022-05-16 RX ORDER — MAGNESIUM HYDROXIDE 1200 MG/15ML
LIQUID ORAL AS NEEDED
Status: DISCONTINUED | OUTPATIENT
Start: 2022-05-16 | End: 2022-05-16 | Stop reason: HOSPADM

## 2022-05-16 RX ORDER — NALOXONE HCL 0.4 MG/ML
0.4 VIAL (ML) INJECTION AS NEEDED
Status: DISCONTINUED | OUTPATIENT
Start: 2022-05-16 | End: 2022-05-16 | Stop reason: HOSPADM

## 2022-05-16 RX ORDER — MIDAZOLAM HYDROCHLORIDE 1 MG/ML
1 INJECTION INTRAMUSCULAR; INTRAVENOUS
Status: COMPLETED | OUTPATIENT
Start: 2022-05-16 | End: 2022-05-16

## 2022-05-16 RX ORDER — PROPOFOL 10 MG/ML
VIAL (ML) INTRAVENOUS AS NEEDED
Status: DISCONTINUED | OUTPATIENT
Start: 2022-05-16 | End: 2022-05-16 | Stop reason: SURG

## 2022-05-16 RX ORDER — FENTANYL CITRATE 50 UG/ML
INJECTION, SOLUTION INTRAMUSCULAR; INTRAVENOUS AS NEEDED
Status: DISCONTINUED | OUTPATIENT
Start: 2022-05-16 | End: 2022-05-16 | Stop reason: SURG

## 2022-05-16 RX ORDER — OXYCODONE HYDROCHLORIDE AND ACETAMINOPHEN 5; 325 MG/1; MG/1
1 TABLET ORAL EVERY 4 HOURS PRN
Qty: 30 TABLET | Refills: 0 | Status: SHIPPED | OUTPATIENT
Start: 2022-05-16 | End: 2023-01-31

## 2022-05-16 RX ORDER — LIDOCAINE HYDROCHLORIDE 10 MG/ML
0.5 INJECTION, SOLUTION EPIDURAL; INFILTRATION; INTRACAUDAL; PERINEURAL ONCE AS NEEDED
Status: DISCONTINUED | OUTPATIENT
Start: 2022-05-16 | End: 2022-05-16 | Stop reason: SDUPTHER

## 2022-05-16 RX ORDER — FLUMAZENIL 0.1 MG/ML
0.2 INJECTION INTRAVENOUS AS NEEDED
Status: DISCONTINUED | OUTPATIENT
Start: 2022-05-16 | End: 2022-05-16 | Stop reason: HOSPADM

## 2022-05-16 RX ORDER — DROPERIDOL 2.5 MG/ML
0.62 INJECTION, SOLUTION INTRAMUSCULAR; INTRAVENOUS ONCE AS NEEDED
Status: COMPLETED | OUTPATIENT
Start: 2022-05-16 | End: 2022-05-16

## 2022-05-16 RX ORDER — BUPIVACAINE HYDROCHLORIDE AND EPINEPHRINE 2.5; 5 MG/ML; UG/ML
INJECTION, SOLUTION INFILTRATION; PERINEURAL AS NEEDED
Status: DISCONTINUED | OUTPATIENT
Start: 2022-05-16 | End: 2022-05-16 | Stop reason: HOSPADM

## 2022-05-16 RX ORDER — LIDOCAINE HYDROCHLORIDE 10 MG/ML
0.5 INJECTION, SOLUTION EPIDURAL; INFILTRATION; INTRACAUDAL; PERINEURAL ONCE AS NEEDED
Status: DISCONTINUED | OUTPATIENT
Start: 2022-05-16 | End: 2022-05-16 | Stop reason: HOSPADM

## 2022-05-16 RX ORDER — LABETALOL HYDROCHLORIDE 5 MG/ML
5 INJECTION, SOLUTION INTRAVENOUS
Status: DISCONTINUED | OUTPATIENT
Start: 2022-05-16 | End: 2022-05-16 | Stop reason: HOSPADM

## 2022-05-16 RX ORDER — SODIUM CHLORIDE, SODIUM LACTATE, POTASSIUM CHLORIDE, CALCIUM CHLORIDE 600; 310; 30; 20 MG/100ML; MG/100ML; MG/100ML; MG/100ML
1000 INJECTION, SOLUTION INTRAVENOUS CONTINUOUS
Status: DISCONTINUED | OUTPATIENT
Start: 2022-05-16 | End: 2022-05-16 | Stop reason: HOSPADM

## 2022-05-16 RX ORDER — ACETAMINOPHEN 500 MG
1000 TABLET ORAL ONCE
Status: COMPLETED | OUTPATIENT
Start: 2022-05-16 | End: 2022-05-16

## 2022-05-16 RX ORDER — NEOSTIGMINE METHYLSULFATE 5 MG/5 ML
SYRINGE (ML) INTRAVENOUS AS NEEDED
Status: DISCONTINUED | OUTPATIENT
Start: 2022-05-16 | End: 2022-05-16 | Stop reason: SURG

## 2022-05-16 RX ORDER — OXYCODONE HYDROCHLORIDE AND ACETAMINOPHEN 5; 325 MG/1; MG/1
1 TABLET ORAL ONCE AS NEEDED
Status: DISCONTINUED | OUTPATIENT
Start: 2022-05-16 | End: 2022-05-16 | Stop reason: HOSPADM

## 2022-05-16 RX ORDER — OXYCODONE AND ACETAMINOPHEN 10; 325 MG/1; MG/1
1 TABLET ORAL ONCE AS NEEDED
Status: DISCONTINUED | OUTPATIENT
Start: 2022-05-16 | End: 2022-05-16 | Stop reason: HOSPADM

## 2022-05-16 RX ORDER — ROCURONIUM BROMIDE 10 MG/ML
INJECTION, SOLUTION INTRAVENOUS AS NEEDED
Status: DISCONTINUED | OUTPATIENT
Start: 2022-05-16 | End: 2022-05-16 | Stop reason: SURG

## 2022-05-16 RX ORDER — SODIUM CHLORIDE 0.9 % (FLUSH) 0.9 %
3 SYRINGE (ML) INJECTION EVERY 12 HOURS SCHEDULED
Status: DISCONTINUED | OUTPATIENT
Start: 2022-05-16 | End: 2022-05-16 | Stop reason: HOSPADM

## 2022-05-16 RX ORDER — SODIUM CHLORIDE 9 MG/ML
INJECTION, SOLUTION INTRAVENOUS AS NEEDED
Status: DISCONTINUED | OUTPATIENT
Start: 2022-05-16 | End: 2022-05-16 | Stop reason: HOSPADM

## 2022-05-16 RX ORDER — SODIUM CHLORIDE 0.9 % (FLUSH) 0.9 %
3-10 SYRINGE (ML) INJECTION AS NEEDED
Status: DISCONTINUED | OUTPATIENT
Start: 2022-05-16 | End: 2022-05-16 | Stop reason: HOSPADM

## 2022-05-16 RX ORDER — SODIUM CHLORIDE, SODIUM LACTATE, POTASSIUM CHLORIDE, CALCIUM CHLORIDE 600; 310; 30; 20 MG/100ML; MG/100ML; MG/100ML; MG/100ML
100 INJECTION, SOLUTION INTRAVENOUS CONTINUOUS
Status: DISCONTINUED | OUTPATIENT
Start: 2022-05-16 | End: 2022-05-16 | Stop reason: HOSPADM

## 2022-05-16 RX ORDER — FENTANYL CITRATE 50 UG/ML
25 INJECTION, SOLUTION INTRAMUSCULAR; INTRAVENOUS
Status: DISCONTINUED | OUTPATIENT
Start: 2022-05-16 | End: 2022-05-16 | Stop reason: HOSPADM

## 2022-05-16 RX ORDER — ONDANSETRON 2 MG/ML
INJECTION INTRAMUSCULAR; INTRAVENOUS AS NEEDED
Status: DISCONTINUED | OUTPATIENT
Start: 2022-05-16 | End: 2022-05-16 | Stop reason: SURG

## 2022-05-16 RX ORDER — OXYCODONE AND ACETAMINOPHEN 7.5; 325 MG/1; MG/1
2 TABLET ORAL EVERY 4 HOURS PRN
Status: DISCONTINUED | OUTPATIENT
Start: 2022-05-16 | End: 2022-05-16 | Stop reason: HOSPADM

## 2022-05-16 RX ORDER — IBUPROFEN 600 MG/1
600 TABLET ORAL ONCE AS NEEDED
Status: COMPLETED | OUTPATIENT
Start: 2022-05-16 | End: 2022-05-16

## 2022-05-16 RX ORDER — LIDOCAINE HYDROCHLORIDE 20 MG/ML
INJECTION, SOLUTION EPIDURAL; INFILTRATION; INTRACAUDAL; PERINEURAL AS NEEDED
Status: DISCONTINUED | OUTPATIENT
Start: 2022-05-16 | End: 2022-05-16 | Stop reason: SURG

## 2022-05-16 RX ADMIN — FENTANYL CITRATE 25 MCG: 0.05 INJECTION, SOLUTION INTRAMUSCULAR; INTRAVENOUS at 14:07

## 2022-05-16 RX ADMIN — Medication 3 MG: at 13:48

## 2022-05-16 RX ADMIN — OXYCODONE HYDROCHLORIDE AND ACETAMINOPHEN 2 TABLET: 7.5; 325 TABLET ORAL at 14:16

## 2022-05-16 RX ADMIN — IBUPROFEN 600 MG: 600 TABLET ORAL at 15:13

## 2022-05-16 RX ADMIN — SODIUM CHLORIDE, POTASSIUM CHLORIDE, SODIUM LACTATE AND CALCIUM CHLORIDE 1000 ML: 600; 310; 30; 20 INJECTION, SOLUTION INTRAVENOUS at 11:47

## 2022-05-16 RX ADMIN — MIDAZOLAM 1 MG: 1 INJECTION INTRAMUSCULAR; INTRAVENOUS at 12:21

## 2022-05-16 RX ADMIN — DROPERIDOL 0.62 MG: 2.5 INJECTION, SOLUTION INTRAMUSCULAR; INTRAVENOUS at 14:07

## 2022-05-16 RX ADMIN — MIDAZOLAM 1 MG: 1 INJECTION INTRAMUSCULAR; INTRAVENOUS at 12:36

## 2022-05-16 RX ADMIN — DEXAMETHASONE SODIUM PHOSPHATE 4 MG: 4 INJECTION, SOLUTION INTRA-ARTICULAR; INTRALESIONAL; INTRAMUSCULAR; INTRAVENOUS; SOFT TISSUE at 13:36

## 2022-05-16 RX ADMIN — SODIUM CHLORIDE, POTASSIUM CHLORIDE, SODIUM LACTATE AND CALCIUM CHLORIDE: 600; 310; 30; 20 INJECTION, SOLUTION INTRAVENOUS at 13:53

## 2022-05-16 RX ADMIN — GLYCOPYRROLATE 0.4 MG: 0.2 INJECTION INTRAMUSCULAR; INTRAVENOUS at 13:48

## 2022-05-16 RX ADMIN — ROCURONIUM BROMIDE 30 MG: 10 INJECTION INTRAVENOUS at 13:04

## 2022-05-16 RX ADMIN — FENTANYL CITRATE 25 MCG: 0.05 INJECTION, SOLUTION INTRAMUSCULAR; INTRAVENOUS at 14:12

## 2022-05-16 RX ADMIN — ONDANSETRON 4 MG: 2 INJECTION INTRAMUSCULAR; INTRAVENOUS at 13:36

## 2022-05-16 RX ADMIN — PROPOFOL 100 MG: 10 INJECTION, EMULSION INTRAVENOUS at 13:04

## 2022-05-16 RX ADMIN — FENTANYL CITRATE 250 MCG: 50 INJECTION, SOLUTION INTRAMUSCULAR; INTRAVENOUS at 13:04

## 2022-05-16 RX ADMIN — LIDOCAINE HYDROCHLORIDE 100 MG: 20 INJECTION, SOLUTION EPIDURAL; INFILTRATION; INTRACAUDAL; PERINEURAL at 13:04

## 2022-05-16 RX ADMIN — ACETAMINOPHEN 1000 MG: 500 TABLET ORAL at 12:20

## 2022-05-16 NOTE — ANESTHESIA POSTPROCEDURE EVALUATION
Patient: Miranda Elliott    Procedure Summary     Date: 05/16/22 Room / Location:  [facility] OR 06 /  [facility]    Anesthesia Start: 1258 Anesthesia Stop: 1403    Procedure: LAPAROSCOPIC CHOLECYSTECTOMY (N/A Abdomen) Diagnosis: (CHOLECYSTITIS)    Surgeons: Laura Rod MD Provider: Miri De Luna CRNA    Anesthesia Type: general ASA Status: 2          Anesthesia Type: general    Vitals  Vitals Value Taken Time   /56 05/16/22 1445   Temp 98.1 °F (36.7 °C) 05/16/22 1445   Pulse 45 05/16/22 1447   Resp 18 05/16/22 1445   SpO2 93 % 05/16/22 1447   Vitals shown include unvalidated device data.        Post Anesthesia Care and Evaluation    Patient location during evaluation: PACU  Patient participation: complete - patient participated  Level of consciousness: awake and alert  Pain management: adequate  Airway patency: patent  Anesthetic complications: No anesthetic complications    Cardiovascular status: acceptable  Respiratory status: acceptable  Hydration status: acceptable    Comments: Blood pressure 90/61, pulse (!) 48, temperature 98.1 °F (36.7 °C), temperature source Temporal, resp. rate 16, SpO2 98 %, not currently breastfeeding.    Pt discharged from PACU based on tiarra score >8

## 2022-05-16 NOTE — OP NOTE
Preoperative diagnosis: symptomatic cholelithiasis  Postoperative diagnosis:  Same  Procedure:  Laparoscopic cholecystectomy  Surgeon: Laura Rod MD  Anesthesia:  Get loc  Ebl:  Minimal  Ivf:  See anesthesia  Indications:  The patient is a 56-year-old female who presents for cholecystectomy.  The risks, benefits, complications, and possible alternatives were discussed with the patient who agreed to proceed.  Description of procedure: the patient was laid supine. The abdomen was prepped and draped.  The abdomen was entered with veress.  Trocars were placed. The fundus and infundibulum were grasped and elevated.  The fundus was taken down from the liver bed with bovie.  Dissection continued to the proximal cystic duct.  Three endoloops were placed around the proximal cystic duct and artery.  The gallbladder was then transected with bovie.  It was placed in an endocatch bag and removed.  The liver bed was then copiously irrigated.  The fascia of the epigastric site was closed with 0 vicryl using endostitch passer. The skin was closed with 3-0 and 4-0 vicryl.  Dry dressings were applied. The sponge, needle, and instrument counts were correct.  Complications: none  Disposition good to pacu  Findings:  Friable gallbladder with some chronic adhesions

## 2022-05-16 NOTE — ANESTHESIA PREPROCEDURE EVALUATION
Anesthesia Evaluation     Patient summary reviewed   no history of anesthetic complications:  NPO Solid Status: > 8 hours  NPO Liquid Status: > 8 hours           Airway   Mallampati: III  TM distance: >3 FB  Neck ROM: limited  Dental          Pulmonary    (+) a smoker Current Abstained day of surgery,   Cardiovascular   Exercise tolerance: good (4-7 METS)    (-) pacemaker, hypertension, valvular problems/murmurs, dysrhythmias, cardiac stents, CABG      Neuro/Psych  (+) neuromuscular disease, numbness, psychiatric history Anxiety,    (-) seizures, CVA    ROS Comment: Vertigo   GI/Hepatic/Renal/Endo    (+) obesity,     (-) liver disease, no renal disease, diabetes, no thyroid disorder    Musculoskeletal     (+) neck pain,   Abdominal   (+) obese,    Substance History      OB/GYN          Other   arthritis,                        Anesthesia Plan    ASA 2     general     intravenous induction     Anesthetic plan, all risks, benefits, and alternatives have been provided, discussed and informed consent has been obtained with: patient.

## 2022-05-16 NOTE — ANESTHESIA PROCEDURE NOTES
Airway  Urgency: elective    Date/Time: 5/16/2022 1:05 PM  Airway not difficult    General Information and Staff    Patient location during procedure: OR  CRNA/CAA: Reji Priest CRNA    Indications and Patient Condition  Indications for airway management: airway protection    Preoxygenated: yes  Mask difficulty assessment: 1 - vent by mask    Final Airway Details  Final airway type: endotracheal airway      Successful airway: ETT  Cuffed: yes   Successful intubation technique: direct laryngoscopy  Facilitating devices/methods: intubating stylet and cricoid pressure  Endotracheal tube insertion site: oral  Blade: Hcino  Blade size: 2  ETT size (mm): 7.5  Cormack-Lehane Classification: grade IIa - partial view of glottis  Placement verified by: chest auscultation and capnometry   Cuff volume (mL): 8  Measured from: teeth  ETT/EBT  to teeth (cm): 21  Number of attempts at approach: 1  Assessment: lips, teeth, and gum same as pre-op and atraumatic intubation

## 2022-05-17 LAB
CYTO UR: NORMAL
LAB AP CASE REPORT: NORMAL
Lab: NORMAL
PATH REPORT.FINAL DX SPEC: NORMAL
PATH REPORT.GROSS SPEC: NORMAL

## 2022-06-06 ENCOUNTER — OFFICE VISIT (OUTPATIENT)
Dept: SURGERY | Facility: CLINIC | Age: 57
End: 2022-06-06

## 2022-06-06 VITALS
SYSTOLIC BLOOD PRESSURE: 112 MMHG | WEIGHT: 203 LBS | HEIGHT: 66 IN | BODY MASS INDEX: 32.62 KG/M2 | DIASTOLIC BLOOD PRESSURE: 70 MMHG

## 2022-06-06 DIAGNOSIS — Z90.49 S/P LAPAROSCOPIC CHOLECYSTECTOMY: Primary | ICD-10-CM

## 2022-06-06 PROCEDURE — 99024 POSTOP FOLLOW-UP VISIT: CPT | Performed by: SPECIALIST

## 2022-06-06 NOTE — PROGRESS NOTES
"Laura Rod MD FACS Progress Note    Referring Provider: No ref. provider found      Chief complaint postop    Subjective .     History of present illness:  Miranda Elliott  is a 56 y.o. female who presents status post laparoscopic cholecystectomy.  Diet is tolerated and bowel function is formed.      History  The following portions of the patient's history were reviewed and updated as appropriate: allergies, current medications, past family history, past medical history, past social history, past surgical history, and problem list.    Objective     Vital Signs   /70   Ht 167.6 cm (66\")   Wt 92.1 kg (203 lb)   LMP  (LMP Unknown)   BMI 32.77 kg/m²      Physical Exam:  General The patient is well-developed, well-nourished, and in no acute distress.    HEENT No scleral icterus  Abdomen The port site incisions are well-approximated and healing without signs of infection.    Integument No jaundice      BMI is >= 30 and <= 34.9 (Class 1 obesity). The following options were offered after discussion: continue to follow up with pcp    Assessment & Plan       Diagnoses and all orders for this visit:    1. S/P laparoscopic cholecystectomy (Primary)           The patient will return prn.      Laura Rod MD  06/06/22  09:44 CDT            " beer

## 2022-06-23 RX ORDER — VARENICLINE TARTRATE 1 MG/1
1 TABLET, FILM COATED ORAL 2 TIMES DAILY
Qty: 60 TABLET | Refills: 3 | Status: SHIPPED | OUTPATIENT
Start: 2022-06-23 | End: 2023-01-31

## 2022-07-06 DIAGNOSIS — H92.01 RIGHT EAR PAIN: ICD-10-CM

## 2022-07-06 DIAGNOSIS — H65.111 NON-RECURRENT ACUTE ALLERGIC OTITIS MEDIA OF RIGHT EAR: Primary | ICD-10-CM

## 2022-07-06 DIAGNOSIS — H65.90 FLUID COLLECTION OF MIDDLE EAR: ICD-10-CM

## 2022-07-06 RX ORDER — FLUTICASONE PROPIONATE 50 MCG
1 SPRAY, SUSPENSION (ML) NASAL DAILY
Qty: 11.1 ML | Refills: 0 | Status: SHIPPED | OUTPATIENT
Start: 2022-07-06 | End: 2023-01-31

## 2022-07-06 RX ORDER — CEFDINIR 300 MG/1
300 CAPSULE ORAL 2 TIMES DAILY
Qty: 20 CAPSULE | Refills: 0 | Status: SHIPPED | OUTPATIENT
Start: 2022-07-06 | End: 2022-07-16

## 2022-07-19 ENCOUNTER — TELEMEDICINE (OUTPATIENT)
Dept: OBSTETRICS AND GYNECOLOGY | Facility: CLINIC | Age: 57
End: 2022-07-19

## 2022-07-19 VITALS
SYSTOLIC BLOOD PRESSURE: 122 MMHG | WEIGHT: 208 LBS | DIASTOLIC BLOOD PRESSURE: 72 MMHG | HEIGHT: 66 IN | BODY MASS INDEX: 33.43 KG/M2

## 2022-07-19 DIAGNOSIS — R63.5 WEIGHT GAIN: Primary | ICD-10-CM

## 2022-07-19 PROCEDURE — 99213 OFFICE O/P EST LOW 20 MIN: CPT | Performed by: NURSE PRACTITIONER

## 2022-07-19 RX ORDER — SEMAGLUTIDE 0.25 MG/.5ML
1 INJECTION, SOLUTION SUBCUTANEOUS WEEKLY
Qty: 2 ML | Refills: 3 | Status: SHIPPED | OUTPATIENT
Start: 2022-07-19 | End: 2023-03-28

## 2022-07-19 NOTE — PROGRESS NOTES
"Subjective     Miranda Elliott is a 56 y.o. female    You have chosen to receive care through a telehealth visit.  Do you consent to use a video/audio connection for your medical care today? Yes    Pt has stopped smoking and has gained weight back. Wishes to restart Wegovy. She had previously lost 27 pounds.        /72   Ht 167.6 cm (66\")   Wt 94.3 kg (208 lb)   LMP  (LMP Unknown)   BMI 33.57 kg/m²     Outpatient Encounter Medications as of 7/19/2022   Medication Sig Dispense Refill   • ALPRAZolam (XANAX) 1 MG tablet Take 1 tablet by mouth 3 (Three) Times a Day As Needed for Anxiety. 90 tablet 0   • cetirizine (zyrTEC) 10 MG tablet Take 10 mg by mouth Daily As Needed for Allergies.     • fluticasone (Flonase) 50 MCG/ACT nasal spray 1 spray into the nostril(s) as directed by provider Daily. 11.1 mL 0   • oxyCODONE-acetaminophen (PERCOCET) 5-325 MG per tablet Take 1 tablet by mouth Every 4 (Four) Hours As Needed (Pain). 30 tablet 0   • Semaglutide-Weight Management (Wegovy) 0.25 MG/0.5ML solution auto-injector Inject 1 dose under the skin into the appropriate area as directed 1 (One) Time Per Week. 2 mL 3   • varenicline (Chantix Continuing Month Pak) 1 MG tablet Take 1 tablet by mouth 2 (Two) Times a Day. 60 tablet 3     No facility-administered encounter medications on file as of 7/19/2022.       Surgical History  Past Surgical History:   Procedure Laterality Date   • BACK SURGERY  1998    Lumbar laminectomy L5-S1   • BACK SURGERY  2012    lumbar fusion L5-S1   • CHOLECYSTECTOMY WITH INTRAOPERATIVE CHOLANGIOGRAM N/A 5/16/2022    Procedure: LAPAROSCOPIC CHOLECYSTECTOMY;  Surgeon: Laura Rod MD;  Location: [facility];  Service: General;  Laterality: N/A;   • COLONOSCOPY  2012   • D & C HYSTEROSCOPY  1998   • ENDOMETRIAL ABLATION  2010    in office   • HAND TENDON SURGERY Left 1982    TENDON GRAFT   • KNEE ARTHROSCOPY Left 07/22/2021    Procedure: LEFT KNEE ARTHROSCOPIC PARTIAL MEDIAL MENISECTOMY;  " Surgeon: Kevin Tovar MD;  Location: [facility];  Service: Orthopedics;  Laterality: Left;   • SALPINGO OOPHORECTOMY Right 2011   • TONSILLECTOMY  1970    and adenoids       Family History  Family History   Problem Relation Age of Onset   • Diabetes Father    • No Known Problems Mother    • No Known Problems Sister    • Prostate cancer Maternal Grandfather    • Breast cancer Neg Hx    • Ovarian cancer Neg Hx    • Uterine cancer Neg Hx    • Colon cancer Neg Hx    • Melanoma Neg Hx        The following portions of the patient's history were reviewed and updated as appropriate: allergies, current medications, past family history, past medical history, past social history, past surgical history, and problem list.    Review of Systems   Constitutional: Negative for activity change, appetite change, chills, diaphoresis, fatigue, fever, unexpected weight gain and unexpected weight loss.   HENT: Negative for congestion, dental problem, drooling, ear discharge, ear pain, facial swelling, hearing loss, mouth sores, nosebleeds, postnasal drip, rhinorrhea, sinus pressure, sneezing, sore throat, swollen glands, tinnitus, trouble swallowing and voice change.    Eyes: Negative for blurred vision, double vision, photophobia, pain, discharge, redness, itching and visual disturbance.   Respiratory: Negative for apnea, cough, choking, chest tightness, shortness of breath, wheezing and stridor.    Cardiovascular: Negative for chest pain, palpitations and leg swelling.   Gastrointestinal: Negative for abdominal distention, abdominal pain, anal bleeding, blood in stool, constipation, diarrhea, nausea, rectal pain, vomiting, GERD and indigestion.   Endocrine: Negative for cold intolerance, heat intolerance, polydipsia, polyphagia and polyuria.   Genitourinary: Negative for amenorrhea, breast discharge, breast lump, breast pain, decreased libido, decreased urine volume, difficulty urinating, dyspareunia, dysuria, flank pain, frequency,  genital sores, hematuria, menstrual problem, pelvic pain, pelvic pressure, urgency, urinary incontinence, vaginal bleeding, vaginal discharge and vaginal pain.   Musculoskeletal: Negative for arthralgias, back pain, gait problem, joint swelling, myalgias, neck pain, neck stiffness and bursitis.   Skin: Negative for color change, dry skin and rash.   Allergic/Immunologic: Negative for environmental allergies, food allergies and immunocompromised state.   Neurological: Negative for dizziness, tremors, seizures, syncope, facial asymmetry, speech difficulty, weakness, light-headedness, numbness, headache, memory problem and confusion.   Hematological: Negative for adenopathy. Does not bruise/bleed easily.   Psychiatric/Behavioral: Negative for agitation, behavioral problems, decreased concentration, dysphoric mood, hallucinations, self-injury, sleep disturbance, suicidal ideas, negative for hyperactivity, depressed mood and stress. The patient is not nervous/anxious.        Objective   Physical Exam  Constitutional:       General: She is not in acute distress.     Appearance: Normal appearance. She is not ill-appearing or diaphoretic.   HENT:      Head: Normocephalic and atraumatic.   Pulmonary:      Effort: Pulmonary effort is normal. No respiratory distress.   Musculoskeletal:         General: No deformity.      Cervical back: Normal range of motion.   Skin:     Coloration: Skin is not pale.   Neurological:      General: No focal deficit present.      Mental Status: She is alert.   Psychiatric:         Mood and Affect: Mood normal.         Behavior: Behavior normal.         Thought Content: Thought content normal.         Judgment: Judgment normal.         Assessment & Plan   Diagnoses and all orders for this visit:    1. Weight gain (Primary)  Comments:  Pt has stopped smoking and has gained some weight back. She had previously lost 37 pounds with wegovy and wishes to restart it. RX sent, if it is still unavailable  will change to ozempic.  Orders:  -     Semaglutide-Weight Management (Wegovy) 0.25 MG/0.5ML solution auto-injector; Inject 1 dose under the skin into the appropriate area as directed 1 (One) Time Per Week.  Dispense: 2 mL; Refill: 3     This was an audio and video enabled telemedicine encounter.      BMI is >= 30 and <35. (Class 1 Obesity). The following options were offered after discussion;: exercise counseling/recommendations, nutrition counseling/recommendations and pharmacological intervention options      Luzmaria Calhoun, APRN  7/19/2022

## 2023-01-31 ENCOUNTER — OFFICE VISIT (OUTPATIENT)
Dept: OBSTETRICS AND GYNECOLOGY | Facility: CLINIC | Age: 58
End: 2023-01-31
Payer: COMMERCIAL

## 2023-01-31 VITALS
WEIGHT: 225 LBS | SYSTOLIC BLOOD PRESSURE: 120 MMHG | DIASTOLIC BLOOD PRESSURE: 88 MMHG | BODY MASS INDEX: 36.16 KG/M2 | HEIGHT: 66 IN

## 2023-01-31 DIAGNOSIS — E55.9 VITAMIN D DEFICIENCY: ICD-10-CM

## 2023-01-31 DIAGNOSIS — Z12.31 ENCOUNTER FOR SCREENING MAMMOGRAM FOR BREAST CANCER: ICD-10-CM

## 2023-01-31 DIAGNOSIS — N95.1 MENOPAUSAL SYMPTOMS: ICD-10-CM

## 2023-01-31 DIAGNOSIS — N89.8 VAGINAL DRYNESS: ICD-10-CM

## 2023-01-31 DIAGNOSIS — R63.5 WEIGHT GAIN: ICD-10-CM

## 2023-01-31 DIAGNOSIS — F17.200 SMOKER: ICD-10-CM

## 2023-01-31 DIAGNOSIS — Z01.419 WELL WOMAN EXAM WITH ROUTINE GYNECOLOGICAL EXAM: Primary | ICD-10-CM

## 2023-01-31 PROCEDURE — 99396 PREV VISIT EST AGE 40-64: CPT | Performed by: NURSE PRACTITIONER

## 2023-01-31 PROCEDURE — 87624 HPV HI-RISK TYP POOLED RSLT: CPT | Performed by: NURSE PRACTITIONER

## 2023-01-31 PROCEDURE — 99213 OFFICE O/P EST LOW 20 MIN: CPT | Performed by: NURSE PRACTITIONER

## 2023-01-31 PROCEDURE — G0123 SCREEN CERV/VAG THIN LAYER: HCPCS | Performed by: NURSE PRACTITIONER

## 2023-01-31 NOTE — PROGRESS NOTES
"Subjective     Miranda Elliott is a 57 y.o. female    History of Present Illness  Patient is here today for yearly checkup.  She has complaints of weight gain.  She has just picked up her weekly IV from the pharmacy.  Gynecologic Exam  The patient's pertinent negatives include no pelvic pain, vaginal bleeding or vaginal discharge. The patient is experiencing no pain. Pertinent negatives include no abdominal pain, anorexia, back pain, chills, constipation, diarrhea, discolored urine, dysuria, fever, flank pain, frequency, headaches, hematuria, joint pain, joint swelling, nausea, painful intercourse, rash, sore throat, urgency or vomiting. She is sexually active. She is postmenopausal.         /88 (BP Location: Left arm, Patient Position: Sitting, Cuff Size: Large Adult)   Ht 167.6 cm (65.98\")   Wt 102 kg (225 lb)   LMP  (LMP Unknown)   BMI 36.33 kg/m²     Outpatient Encounter Medications as of 1/31/2023   Medication Sig Dispense Refill   • ALPRAZolam (XANAX) 1 MG tablet Take 1 tablet by mouth 3 (Three) Times a Day As Needed for Anxiety. 90 tablet 0   • cetirizine (zyrTEC) 10 MG tablet Take 10 mg by mouth Daily As Needed for Allergies.     • Semaglutide-Weight Management (Wegovy) 0.25 MG/0.5ML solution auto-injector Inject 0.25mg under the skin into the appropriate area as directed 1 (One) Time Per Week. 2 mL 3   • [DISCONTINUED] fluticasone (Flonase) 50 MCG/ACT nasal spray 1 spray into the nostril(s) as directed by provider Daily. 11.1 mL 0   • [DISCONTINUED] oxyCODONE-acetaminophen (PERCOCET) 5-325 MG per tablet Take 1 tablet by mouth Every 4 (Four) Hours As Needed (Pain). 30 tablet 0   • [DISCONTINUED] varenicline (Chantix Continuing Month Aayush) 1 MG tablet Take 1 tablet by mouth 2 (Two) Times a Day. 60 tablet 3     No facility-administered encounter medications on file as of 1/31/2023.       Surgical History  Past Surgical History:   Procedure Laterality Date   • BACK SURGERY  1998    Lumbar laminectomy " L5-S1   • BACK SURGERY  2012    lumbar fusion L5-S1   • CHOLECYSTECTOMY WITH INTRAOPERATIVE CHOLANGIOGRAM N/A 05/16/2022    Procedure: LAPAROSCOPIC CHOLECYSTECTOMY;  Surgeon: Laura Rod MD;  Location: [facility];  Service: General;  Laterality: N/A;   • COLONOSCOPY  2012   • D & C HYSTEROSCOPY  1998   • DILATATION AND CURETTAGE     • ENDOMETRIAL ABLATION  2010    in office   • HAND TENDON SURGERY Left 1982    TENDON GRAFT   • KNEE ARTHROSCOPY Left 07/22/2021    Procedure: LEFT KNEE ARTHROSCOPIC PARTIAL MEDIAL MENISECTOMY;  Surgeon: Kevin Tovar MD;  Location: [facility];  Service: Orthopedics;  Laterality: Left;   • LAPAROSCOPIC CHOLECYSTECTOMY  May 2022   • OVARIAN CYST SURGERY  2010   • SALPINGO OOPHORECTOMY Right 2011   • TONSILLECTOMY  1970    and adenoids   • WISDOM TOOTH EXTRACTION  1980       Family History  Family History   Problem Relation Age of Onset   • Diabetes Father         Type 2   • Kidney cancer Mother    • No Known Problems Sister    • Prostate cancer Maternal Grandfather    • Breast cancer Neg Hx    • Ovarian cancer Neg Hx    • Uterine cancer Neg Hx    • Colon cancer Neg Hx    • Melanoma Neg Hx        The following portions of the patient's history were reviewed and updated as appropriate: allergies, current medications, past family history, past medical history, past social history, past surgical history, and problem list.    Review of Systems   Constitutional: Positive for unexpected weight gain. Negative for activity change, appetite change, chills, diaphoresis, fatigue, fever and unexpected weight loss.   HENT: Negative for congestion, dental problem, drooling, ear discharge, ear pain, facial swelling, hearing loss, mouth sores, nosebleeds, postnasal drip, rhinorrhea, sinus pressure, sneezing, sore throat, swollen glands, tinnitus, trouble swallowing and voice change.    Eyes: Negative for blurred vision, double vision, photophobia, pain, discharge, redness, itching and visual  disturbance.   Respiratory: Negative for apnea, cough, choking, chest tightness, shortness of breath, wheezing and stridor.    Cardiovascular: Negative for chest pain, palpitations and leg swelling.   Gastrointestinal: Negative for abdominal distention, abdominal pain, anal bleeding, anorexia, blood in stool, constipation, diarrhea, nausea, rectal pain, vomiting, GERD and indigestion.   Endocrine: Positive for heat intolerance. Negative for cold intolerance, polydipsia, polyphagia and polyuria.   Genitourinary: Negative for amenorrhea, breast discharge, breast lump, breast pain, decreased libido, decreased urine volume, difficulty urinating, dyspareunia, dysuria, flank pain, frequency, genital sores, hematuria, menstrual problem, pelvic pain, pelvic pressure, urgency, urinary incontinence, vaginal bleeding, vaginal discharge and vaginal pain.   Musculoskeletal: Negative for arthralgias, back pain, gait problem, joint pain, joint swelling, myalgias, neck pain, neck stiffness and bursitis.   Skin: Negative for color change, dry skin and rash.   Allergic/Immunologic: Negative for environmental allergies, food allergies and immunocompromised state.   Neurological: Negative for dizziness, tremors, seizures, syncope, facial asymmetry, speech difficulty, weakness, light-headedness, numbness, headache, memory problem and confusion.   Hematological: Negative for adenopathy. Does not bruise/bleed easily.   Psychiatric/Behavioral: Negative for agitation, behavioral problems, decreased concentration, dysphoric mood, hallucinations, self-injury, sleep disturbance, suicidal ideas, negative for hyperactivity, depressed mood and stress. The patient is not nervous/anxious.        Objective   Physical Exam  Vitals and nursing note reviewed. Exam conducted with a chaperone present.   Constitutional:       General: She is not in acute distress.     Appearance: She is well-developed. She is not diaphoretic.   HENT:      Head:  Normocephalic.      Right Ear: External ear normal.      Left Ear: External ear normal.      Nose: Nose normal.   Eyes:      General: No scleral icterus.        Right eye: No discharge.         Left eye: No discharge.      Conjunctiva/sclera: Conjunctivae normal.      Pupils: Pupils are equal, round, and reactive to light.   Neck:      Thyroid: No thyromegaly.      Vascular: No carotid bruit.      Trachea: No tracheal deviation.   Cardiovascular:      Rate and Rhythm: Normal rate and regular rhythm.      Heart sounds: Normal heart sounds. No murmur heard.  Pulmonary:      Effort: Pulmonary effort is normal. No respiratory distress.      Breath sounds: Normal breath sounds. No wheezing.   Chest:   Breasts:     Breasts are symmetrical.      Right: Normal. No swelling, bleeding, inverted nipple, mass, nipple discharge, skin change or tenderness.      Left: Normal. No swelling, bleeding, inverted nipple, mass, nipple discharge, skin change or tenderness.   Abdominal:      General: There is no distension.      Palpations: Abdomen is soft. There is no mass.      Tenderness: There is no abdominal tenderness. There is no right CVA tenderness, left CVA tenderness or guarding.      Hernia: No hernia is present. There is no hernia in the left inguinal area or right inguinal area.   Genitourinary:     General: Normal vulva.      Exam position: Lithotomy position.      Labia:         Right: No rash, tenderness, lesion or injury.         Left: No rash, tenderness, lesion or injury.       Vagina: Normal. No signs of injury and foreign body. No vaginal discharge, erythema, tenderness or bleeding.      Cervix: Normal.      Uterus: Normal. Not enlarged, not fixed and not tender.       Adnexa: Right adnexa normal and left adnexa normal.        Right: No mass, tenderness or fullness.          Left: No mass, tenderness or fullness.        Rectum: Normal. No mass.      Comments:   BSU normal  Urethral meatus  Normal  Perineum   Normal  Musculoskeletal:         General: No tenderness. Normal range of motion.      Cervical back: Normal range of motion and neck supple.   Lymphadenopathy:      Head:      Right side of head: No submental, submandibular, tonsillar, preauricular, posterior auricular or occipital adenopathy.      Left side of head: No submental, submandibular, tonsillar, preauricular, posterior auricular or occipital adenopathy.      Cervical: No cervical adenopathy.      Right cervical: No superficial, deep or posterior cervical adenopathy.     Left cervical: No superficial, deep or posterior cervical adenopathy.      Upper Body:      Right upper body: No supraclavicular, axillary or pectoral adenopathy.      Left upper body: No supraclavicular, axillary or pectoral adenopathy.      Lower Body: No right inguinal adenopathy. No left inguinal adenopathy.   Skin:     General: Skin is warm and dry.      Findings: No bruising, erythema or rash.   Neurological:      Mental Status: She is alert and oriented to person, place, and time.      Coordination: Coordination normal.   Psychiatric:         Mood and Affect: Mood normal.         Behavior: Behavior normal.         Thought Content: Thought content normal.         Judgment: Judgment normal.         Assessment & Plan   Diagnoses and all orders for this visit:    1. Well woman exam with routine gynecological exam (Primary)  Normal GYN exam. Will have lab work at [facility]. Encouraged SBE, pt is aware how to do self breast exam and the importance of same. Discussed weight management and importance of maintaining a healthy weight. Discussed Vitamin D intake and the importance of adequate vitamin D for both Bone Health and a healthy immune system.  Discussed Daily exercise and the importance of same, in regards to a healthy heart as well as helping to maintain her weight and improving her mental health.  BMI 36.3.  Colonoscopy is past due.  Patient does not wish to schedule at  the present time.  She is encouraged to do as soon as possible.  Mammogram will be scheduled at [facility].  Pap smear is done per [facility] guidelines.  -     Liquid-based Pap Smear, Screening  -     CBC & Differential  -     Comprehensive Metabolic Panel  -     Lipid Panel With LDL / HDL Ratio  -     TSH  -     T3, Uptake  -     T4, Free  -     Hemoglobin A1c  -     Urine Culture - , Urine, Clean Catch  -     UA / M With / Rflx Culture([facility] ONLY) - Urine, Clean Catch  -     Hepatitis C Antibody    2. Encounter for screening mammogram for breast cancer  Comments:  Patient will have a mammogram at [facility].  Orders:  -     Mammo Screening Digital Tomosynthesis Bilateral With CAD; Future    3. Weight gain  Comments:  Patient has continued to gain weight.  She was given a prescription for Wegovy last summer but has just now filled it.  She wishes to stay on the 0.25 treatment for 2 months.  She already has a prescription with refills.  She will RTO in 2 months for follow-up.    4. Vitamin D deficiency  Comments:  Patient will have labs drawn.  Orders:  -     Vitamin D,25-Hydroxy    5. Vaginal dryness  Comments:  Patient is having vaginal dryness.  She is encouraged to use coconut oil daily.    6. Smoker  Comments:  Patient has been a previous smoker.  She stopped approximately 6 months ago.  She will have a low-dose CT at [facility].  Orders:  -      CT Chest Low Dose Cancer Screening WO; Future    7. Menopausal symptoms  Comments:  Patient is having occasional hot flashes.  She does not want any medications at the present time.         Class 2 Severe Obesity (BMI >=35 and <=39.9). Obesity-related health conditions include the following: none. Obesity is worsening. BMI is is above average; BMI management plan is completed. We discussed portion control and increasing exercise.      Luzmaria Calhoun, APRN  1/31/2023

## 2023-02-03 LAB
GEN CATEG CVX/VAG CYTO-IMP: NORMAL
HPV I/H RISK 4 DNA CVX QL PROBE+SIG AMP: NOT DETECTED
LAB AP CASE REPORT: NORMAL
LAB AP GYN ADDITIONAL INFORMATION: NORMAL
Lab: NORMAL
PATH INTERP SPEC-IMP: NORMAL
STAT OF ADQ CVX/VAG CYTO-IMP: NORMAL

## 2023-02-16 ENCOUNTER — HOSPITAL ENCOUNTER (OUTPATIENT)
Dept: CT IMAGING | Facility: HOSPITAL | Age: 58
Discharge: HOME | End: 2023-02-16
Payer: COMMERCIAL

## 2023-02-16 ENCOUNTER — HOSPITAL ENCOUNTER (OUTPATIENT)
Dept: MAMMOGRAPHY | Facility: HOSPITAL | Age: 58
Discharge: HOME | End: 2023-02-16
Payer: COMMERCIAL

## 2023-02-16 DIAGNOSIS — Z12.31 ENCOUNTER FOR SCREENING MAMMOGRAM FOR BREAST CANCER: ICD-10-CM

## 2023-02-16 DIAGNOSIS — F17.200 SMOKER: ICD-10-CM

## 2023-02-16 PROCEDURE — 77063 BREAST TOMOSYNTHESIS BI: CPT

## 2023-02-16 PROCEDURE — 71271 CT THORAX LUNG CANCER SCR C-: CPT

## 2023-02-16 PROCEDURE — 77067 SCR MAMMO BI INCL CAD: CPT

## 2023-02-24 ENCOUNTER — PATIENT MESSAGE (OUTPATIENT)
Dept: OBSTETRICS AND GYNECOLOGY | Facility: CLINIC | Age: 58
End: 2023-02-24
Payer: COMMERCIAL

## 2023-03-03 ENCOUNTER — TELEPHONE (OUTPATIENT)
Dept: OBSTETRICS AND GYNECOLOGY | Facility: CLINIC | Age: 58
End: 2023-03-03
Payer: COMMERCIAL

## 2023-03-03 NOTE — TELEPHONE ENCOUNTER
PA approved for Wegovy. Approval letter states approval good from 2/27/23 to 2/27/24 as long as patient remains covered under current plan. Approval letter faxed to pharmacy and scanned into chart. Spoke with patient and informed her of PA approval for Wegovy.   TENDERNESS/PAIN

## 2023-03-07 ENCOUNTER — LAB (OUTPATIENT)
Dept: LAB | Facility: HOSPITAL | Age: 58
End: 2023-03-07
Payer: COMMERCIAL

## 2023-03-07 LAB
25(OH)D3 SERPL-MCNC: 41.5 NG/ML (ref 30–100)
ALBUMIN SERPL-MCNC: 4.4 G/DL (ref 3.5–5.2)
ALBUMIN/GLOB SERPL: 2 G/DL
ALP SERPL-CCNC: 81 U/L (ref 39–117)
ALT SERPL W P-5'-P-CCNC: 22 U/L (ref 1–33)
ANION GAP SERPL CALCULATED.3IONS-SCNC: 10 MMOL/L (ref 5–15)
AST SERPL-CCNC: 15 U/L (ref 1–32)
BASOPHILS # BLD AUTO: 0.05 10*3/MM3 (ref 0–0.2)
BASOPHILS NFR BLD AUTO: 0.9 % (ref 0–1.5)
BILIRUB SERPL-MCNC: 0.3 MG/DL (ref 0–1.2)
BUN SERPL-MCNC: 13 MG/DL (ref 6–20)
BUN/CREAT SERPL: 14.3 (ref 7–25)
CALCIUM SPEC-SCNC: 9.2 MG/DL (ref 8.6–10.5)
CHLORIDE SERPL-SCNC: 104 MMOL/L (ref 98–107)
CHOLEST SERPL-MCNC: 188 MG/DL (ref 0–200)
CO2 SERPL-SCNC: 26 MMOL/L (ref 22–29)
CREAT SERPL-MCNC: 0.91 MG/DL (ref 0.57–1)
DEPRECATED RDW RBC AUTO: 39.2 FL (ref 37–54)
EGFRCR SERPLBLD CKD-EPI 2021: 73.7 ML/MIN/1.73
EOSINOPHIL # BLD AUTO: 0.12 10*3/MM3 (ref 0–0.4)
EOSINOPHIL NFR BLD AUTO: 2.2 % (ref 0.3–6.2)
ERYTHROCYTE [DISTWIDTH] IN BLOOD BY AUTOMATED COUNT: 12.5 % (ref 12.3–15.4)
GLOBULIN UR ELPH-MCNC: 2.2 GM/DL
GLUCOSE SERPL-MCNC: 93 MG/DL (ref 65–99)
HBA1C MFR BLD: 5.5 % (ref 4.8–5.6)
HCT VFR BLD AUTO: 46 % (ref 34–46.6)
HCV AB SER DONR QL: NORMAL
HDLC SERPL-MCNC: 40 MG/DL (ref 40–60)
HGB BLD-MCNC: 14.9 G/DL (ref 12–15.9)
IMM GRANULOCYTES # BLD AUTO: 0.01 10*3/MM3 (ref 0–0.05)
IMM GRANULOCYTES NFR BLD AUTO: 0.2 % (ref 0–0.5)
LDLC SERPL CALC-MCNC: 124 MG/DL (ref 0–100)
LDLC/HDLC SERPL: 3.03 {RATIO}
LYMPHOCYTES # BLD AUTO: 1.59 10*3/MM3 (ref 0.7–3.1)
LYMPHOCYTES NFR BLD AUTO: 29.4 % (ref 19.6–45.3)
MCH RBC QN AUTO: 28.1 PG (ref 26.6–33)
MCHC RBC AUTO-ENTMCNC: 32.4 G/DL (ref 31.5–35.7)
MCV RBC AUTO: 86.6 FL (ref 79–97)
MONOCYTES # BLD AUTO: 0.31 10*3/MM3 (ref 0.1–0.9)
MONOCYTES NFR BLD AUTO: 5.7 % (ref 5–12)
NEUTROPHILS NFR BLD AUTO: 3.32 10*3/MM3 (ref 1.7–7)
NEUTROPHILS NFR BLD AUTO: 61.6 % (ref 42.7–76)
NRBC BLD AUTO-RTO: 0 /100 WBC (ref 0–0.2)
PLATELET # BLD AUTO: 272 10*3/MM3 (ref 140–450)
PMV BLD AUTO: 8.9 FL (ref 6–12)
POTASSIUM SERPL-SCNC: 4.6 MMOL/L (ref 3.5–5.2)
PROT SERPL-MCNC: 6.6 G/DL (ref 6–8.5)
RBC # BLD AUTO: 5.31 10*6/MM3 (ref 3.77–5.28)
SODIUM SERPL-SCNC: 140 MMOL/L (ref 136–145)
T4 FREE SERPL-MCNC: 1.22 NG/DL (ref 0.93–1.7)
TRIGL SERPL-MCNC: 135 MG/DL (ref 0–150)
TSH SERPL DL<=0.05 MIU/L-ACNC: 0.94 UIU/ML (ref 0.27–4.2)
VLDLC SERPL-MCNC: 24 MG/DL (ref 5–40)
WBC NRBC COR # BLD: 5.4 10*3/MM3 (ref 3.4–10.8)

## 2023-03-07 PROCEDURE — 83036 HEMOGLOBIN GLYCOSYLATED A1C: CPT | Performed by: NURSE PRACTITIONER

## 2023-03-07 PROCEDURE — 36415 COLL VENOUS BLD VENIPUNCTURE: CPT | Performed by: NURSE PRACTITIONER

## 2023-03-07 PROCEDURE — 84479 ASSAY OF THYROID (T3 OR T4): CPT | Performed by: NURSE PRACTITIONER

## 2023-03-07 PROCEDURE — 80061 LIPID PANEL: CPT | Performed by: NURSE PRACTITIONER

## 2023-03-07 PROCEDURE — 84439 ASSAY OF FREE THYROXINE: CPT | Performed by: NURSE PRACTITIONER

## 2023-03-07 PROCEDURE — 86803 HEPATITIS C AB TEST: CPT | Performed by: NURSE PRACTITIONER

## 2023-03-07 PROCEDURE — 82306 VITAMIN D 25 HYDROXY: CPT | Performed by: NURSE PRACTITIONER

## 2023-03-07 PROCEDURE — 80050 GENERAL HEALTH PANEL: CPT | Performed by: NURSE PRACTITIONER

## 2023-03-08 LAB — T3RU NFR SERPL: 29 % (ref 24–39)

## 2023-03-08 RX ORDER — ERGOCALCIFEROL 1.25 MG/1
50000 CAPSULE ORAL WEEKLY
Qty: 5 CAPSULE | Refills: 3 | Status: SHIPPED | OUTPATIENT
Start: 2023-03-08

## 2023-03-28 ENCOUNTER — OFFICE VISIT (OUTPATIENT)
Dept: OBSTETRICS AND GYNECOLOGY | Facility: CLINIC | Age: 58
End: 2023-03-28
Payer: COMMERCIAL

## 2023-03-28 VITALS
SYSTOLIC BLOOD PRESSURE: 106 MMHG | HEIGHT: 66 IN | WEIGHT: 216 LBS | BODY MASS INDEX: 34.72 KG/M2 | DIASTOLIC BLOOD PRESSURE: 72 MMHG

## 2023-03-28 DIAGNOSIS — R63.5 WEIGHT GAIN: ICD-10-CM

## 2023-03-28 PROCEDURE — 99213 OFFICE O/P EST LOW 20 MIN: CPT | Performed by: NURSE PRACTITIONER

## 2023-03-28 RX ORDER — SEMAGLUTIDE 0.5 MG/.5ML
0.5 INJECTION, SOLUTION SUBCUTANEOUS WEEKLY
Qty: 2 ML | Refills: 3 | Status: SHIPPED | OUTPATIENT
Start: 2023-03-28

## 2023-03-28 RX ORDER — SEMAGLUTIDE 0.25 MG/.5ML
1 INJECTION, SOLUTION SUBCUTANEOUS WEEKLY
Qty: 2 ML | Refills: 3 | Status: CANCELLED | OUTPATIENT
Start: 2023-03-28

## 2023-03-28 NOTE — PROGRESS NOTES
"Subjective     Miranda Elliott is a 57 y.o. female    History of Present Illness  Here for follow up on weight loss. She has been on Wegovy for 2 month. She has lost 9 pounds. She has lost a total of 9 pounds. She has not had any side effects from the medication. She has been doing well with diet and exercise and has been drinking at least 64 ozs. of water daily. We discussed increasing Protein in her diet.            /72   Ht 167.6 cm (65.98\")   Wt 98 kg (216 lb)   LMP  (LMP Unknown)   BMI 34.88 kg/m²     Outpatient Encounter Medications as of 3/28/2023   Medication Sig Dispense Refill   • ALPRAZolam (XANAX) 1 MG tablet Take 1 tablet by mouth 3 (Three) Times a Day As Needed for Anxiety. 90 tablet 0   • vitamin D (ERGOCALCIFEROL) 1.25 MG (52523 UT) capsule capsule Take 1 capsule by mouth 1 (One) Time Per Week. 5 capsule 3   • [DISCONTINUED] Semaglutide-Weight Management (Wegovy) 0.25 MG/0.5ML solution auto-injector Inject 0.25mg under the skin into the appropriate area as directed 1 (One) Time Per Week. 2 mL 3   • Semaglutide-Weight Management (Wegovy) 0.5 MG/0.5ML solution auto-injector Inject 0.5 mL under the skin into the appropriate area as directed 1 (One) Time Per Week. 5 mL 3   • [DISCONTINUED] cetirizine (zyrTEC) 10 MG tablet Take 10 mg by mouth Daily As Needed for Allergies.       No facility-administered encounter medications on file as of 3/28/2023.       Surgical History  Past Surgical History:   Procedure Laterality Date   • BACK SURGERY  1998    Lumbar laminectomy L5-S1   • BACK SURGERY  2012    lumbar fusion L5-S1   • CHOLECYSTECTOMY WITH INTRAOPERATIVE CHOLANGIOGRAM N/A 05/16/2022    Procedure: LAPAROSCOPIC CHOLECYSTECTOMY;  Surgeon: Laura Rod MD;  Location: [facility];  Service: General;  Laterality: N/A;   • COLONOSCOPY  2012   • D & C HYSTEROSCOPY  1998   • DILATATION AND CURETTAGE     • ENDOMETRIAL ABLATION  2010    in office   • HAND TENDON SURGERY Left 1982    TENDON GRAFT "   • KNEE ARTHROSCOPY Left 07/22/2021    Procedure: LEFT KNEE ARTHROSCOPIC PARTIAL MEDIAL MENISECTOMY;  Surgeon: Kevin Tovar MD;  Location: [facility];  Service: Orthopedics;  Laterality: Left;   • LAPAROSCOPIC CHOLECYSTECTOMY  May 2022   • OVARIAN CYST SURGERY  2010   • SALPINGO OOPHORECTOMY Right 2011   • TONSILLECTOMY  1970    and adenoids   • WISDOM TOOTH EXTRACTION  1980       Family History  Family History   Problem Relation Age of Onset   • Diabetes Father         Type 2   • Kidney cancer Mother    • No Known Problems Sister    • Prostate cancer Maternal Grandfather    • Breast cancer Neg Hx    • Ovarian cancer Neg Hx    • Uterine cancer Neg Hx    • Colon cancer Neg Hx    • Melanoma Neg Hx        The following portions of the patient's history were reviewed and updated as appropriate: allergies, current medications, past family history, past medical history, past social history, past surgical history, and problem list.    Review of Systems   Constitutional: Negative for activity change, appetite change, chills, diaphoresis, fatigue, fever, unexpected weight gain and unexpected weight loss.   HENT: Negative for congestion, dental problem, drooling, ear discharge, ear pain, facial swelling, hearing loss, mouth sores, nosebleeds, postnasal drip, rhinorrhea, sinus pressure, sneezing, sore throat, swollen glands, tinnitus, trouble swallowing and voice change.    Eyes: Negative for blurred vision, double vision, photophobia, pain, discharge, redness, itching and visual disturbance.   Respiratory: Negative for apnea, cough, choking, chest tightness, shortness of breath, wheezing and stridor.    Cardiovascular: Negative for chest pain, palpitations and leg swelling.   Gastrointestinal: Negative for abdominal distention, abdominal pain, anal bleeding, blood in stool, constipation, diarrhea, nausea, rectal pain, vomiting, GERD and indigestion.   Endocrine: Negative for cold intolerance, heat intolerance, polydipsia,  polyphagia and polyuria.   Genitourinary: Negative for amenorrhea, breast discharge, breast lump, breast pain, decreased libido, decreased urine volume, difficulty urinating, dyspareunia, dysuria, flank pain, frequency, genital sores, hematuria, menstrual problem, pelvic pain, pelvic pressure, urgency, urinary incontinence, vaginal bleeding, vaginal discharge and vaginal pain.   Musculoskeletal: Negative for arthralgias, back pain, gait problem, joint swelling, myalgias, neck pain, neck stiffness and bursitis.   Skin: Negative for color change, dry skin and rash.   Allergic/Immunologic: Negative for environmental allergies, food allergies and immunocompromised state.   Neurological: Negative for dizziness, tremors, seizures, syncope, facial asymmetry, speech difficulty, weakness, light-headedness, numbness, headache, memory problem and confusion.   Hematological: Negative for adenopathy. Does not bruise/bleed easily.   Psychiatric/Behavioral: Negative for agitation, behavioral problems, decreased concentration, dysphoric mood, hallucinations, self-injury, sleep disturbance, suicidal ideas, negative for hyperactivity, depressed mood and stress. The patient is not nervous/anxious.        Objective   Physical Exam  Vitals and nursing note reviewed.   Constitutional:       Appearance: She is well-developed.   HENT:      Head: Normocephalic and atraumatic.   Eyes:      General:         Right eye: No discharge.         Left eye: No discharge.      Conjunctiva/sclera: Conjunctivae normal.   Neck:      Thyroid: No thyromegaly.   Cardiovascular:      Rate and Rhythm: Normal rate and regular rhythm.      Heart sounds: Normal heart sounds.   Pulmonary:      Effort: Pulmonary effort is normal.      Breath sounds: Normal breath sounds.   Musculoskeletal:         General: Normal range of motion.      Cervical back: Normal range of motion and neck supple.   Skin:     General: Skin is warm and dry.   Neurological:      Mental  Status: She is alert and oriented to person, place, and time.   Psychiatric:         Mood and Affect: Mood normal.         Behavior: Behavior normal.         Thought Content: Thought content normal.         Judgment: Judgment normal.         Assessment & Plan   Diagnoses and all orders for this visit:    1. Weight gain  Comments:  Pt has been on Wegovy for 2 months, She has lost 9 pounds. She will increase to 0.5 mg.  Orders:  -     Semaglutide-Weight Management (Wegovy) 0.5 MG/0.5ML solution auto-injector; Inject 0.5 mL under the skin into the appropriate area as directed 1 (One) Time Per Week.  Dispense: 5 mL; Refill: 3         BMI is >= 30 and <35. (Class 1 Obesity). The following options were offered after discussion;: weight loss educational material (shared in after visit summary), exercise counseling/recommendations, nutrition counseling/recommendations and pharmacological intervention options      Luzmaria Calhoun, APRN  3/28/2023

## 2023-05-19 ENCOUNTER — PRIOR AUTHORIZATION (OUTPATIENT)
Dept: OBSTETRICS AND GYNECOLOGY | Facility: CLINIC | Age: 58
End: 2023-05-19
Payer: COMMERCIAL

## 2023-05-19 NOTE — TELEPHONE ENCOUNTER
Attempted to submit PA for Wegovy 0.5mg. Patient's insurance will not cover this dose as this is a titration dose. For her insurance to cover the Wegovy the patient must increase in dose.

## 2023-05-22 ENCOUNTER — DOCUMENTATION (OUTPATIENT)
Dept: OBSTETRICS AND GYNECOLOGY | Facility: CLINIC | Age: 58
End: 2023-05-22
Payer: COMMERCIAL

## 2023-05-22 RX ORDER — SEMAGLUTIDE 1 MG/.5ML
1 INJECTION, SOLUTION SUBCUTANEOUS WEEKLY
Qty: 2 ML | Refills: 1 | Status: SHIPPED | OUTPATIENT
Start: 2023-05-22

## 2023-07-25 ENCOUNTER — TRANSCRIBE ORDERS (OUTPATIENT)
Dept: ADMINISTRATIVE | Facility: HOSPITAL | Age: 58
End: 2023-07-25
Payer: COMMERCIAL

## 2023-07-25 DIAGNOSIS — M23.92 UNSPECIFIED INTERNAL DERANGEMENT OF LEFT KNEE: Primary | ICD-10-CM

## 2023-07-26 ENCOUNTER — TRANSCRIBE ORDERS (OUTPATIENT)
Dept: PHYSICAL THERAPY | Facility: CLINIC | Age: 58
End: 2023-07-26
Payer: COMMERCIAL

## 2023-07-26 DIAGNOSIS — M23.92 INTERNAL DERANGEMENT OF LEFT KNEE: Primary | ICD-10-CM

## 2023-07-27 ENCOUNTER — TREATMENT (OUTPATIENT)
Dept: PHYSICAL THERAPY | Facility: CLINIC | Age: 58
End: 2023-07-27
Payer: COMMERCIAL

## 2023-07-27 DIAGNOSIS — M25.562 CHRONIC PAIN OF LEFT KNEE: Primary | ICD-10-CM

## 2023-07-27 DIAGNOSIS — M23.92 INTERNAL DERANGEMENT OF KNEE, LEFT: ICD-10-CM

## 2023-07-27 DIAGNOSIS — G89.29 CHRONIC PAIN OF LEFT KNEE: Primary | ICD-10-CM

## 2023-07-27 PROCEDURE — 97161 PT EVAL LOW COMPLEX 20 MIN: CPT | Performed by: PHYSICAL THERAPIST

## 2023-07-27 PROCEDURE — 97110 THERAPEUTIC EXERCISES: CPT | Performed by: PHYSICAL THERAPIST

## 2023-07-27 NOTE — PROGRESS NOTES
Physical Therapy Initial Evaluation and Plan of Care  [address] [place] [address]    Patient: Miranda Elliott               : 1965  Visit Date: 2023  Referring practitioner: ROSEMARIE Ray  Date of Initial Visit: 2023  Patient seen for 1 sessions    Visit Diagnoses:    ICD-10-CM ICD-9-CM   1. Chronic pain of left knee  M25.562 719.46    G89.29 338.29   2. Internal derangement of knee, left  M23.92 717.9     Past Medical History:   Diagnosis Date    Abnormal Pap smear of cervix     Anxiety     Cervical dysplasia     Medial meniscus tear     Ovarian cyst     Dont remember the date    PMS (premenstrual syndrome)     Polycystic ovary syndrome     Possibly?    Recurrent pregnancy loss, antepartum condition or complication Dec 1998    Seasonal allergies     Urinary tract infection 1990    Varicella 1979     Past Surgical History:   Procedure Laterality Date    BACK SURGERY      Lumbar laminectomy L5-S1    BACK SURGERY      lumbar fusion L5-S1    CHOLECYSTECTOMY WITH INTRAOPERATIVE CHOLANGIOGRAM N/A 2022    Procedure: LAPAROSCOPIC CHOLECYSTECTOMY;  Surgeon: Laura Rod MD;  Location: [facility];  Service: General;  Laterality: N/A;    COLONOSCOPY      D & C HYSTEROSCOPY      DILATATION AND CURETTAGE      ENDOMETRIAL ABLATION  2010    in office    HAND TENDON SURGERY Left     TENDON GRAFT    KNEE ARTHROSCOPY Left 2021    Procedure: LEFT KNEE ARTHROSCOPIC PARTIAL MEDIAL MENISECTOMY;  Surgeon: Kevin Tovar MD;  Location: [facility];  Service: Orthopedics;  Laterality: Left;    LAPAROSCOPIC CHOLECYSTECTOMY  May 2022    OVARIAN CYST SURGERY  2010    SALPINGO OOPHORECTOMY Right     TONSILLECTOMY  1970    and adenoids    WISDOM TOOTH EXTRACTION           SUBJECTIVE     Subjective Evaluation    History of Present Illness  Mechanism of injury: She says a week ago, she could go up and down stairs but  "now she can't. Two years ago, she had a meniscal repair. If she is carrying something heavy, she feels more pressure in her knee. Her c/c was pain on the anterior joint line.       Patient Occupation: lab supervisor at [employer]  Current pain ratin  Location: left anterior knee  Quality: dull ache and pressure  Exacerbated by: stairclimbing.  Progression: no change    Diagnostic Tests  No diagnostic tests performed    Patient Goals  Patient goals for therapy: decreased pain, improved balance, increased strength and independence with ADLs/IADLs              OBJECTIVE     Objective          Palpation     Additional Palpation Details  Tender popliteus    Tenderness   Left Knee   Tenderness in the pes anserinus and popliteal fossa. No tenderness in the patellar tendon and plica.     Neurological Testing     Sensation     Knee   Left Knee   Intact: Light touch    Strength/Myotome Testing     Left Knee   Flexion: 4+  Extension: 4+    Tests     Left Knee   Positive patellar compression and patella-femoral grind.   Negative anterior Lachman, Apley's compression, Apley's distraction, Thessaly's test at 5 degrees, Thessaly's test at 20 degrees, valgus stress test at 0 degrees and valgus stress test at 30 degrees.     Left Knee Flexibility Comments:   Left prone knee flexion lacked 20 deg compared to right.    Functional Assessment     Comments  Unable to step up 6\" step without significant UE pull on rail. Able to descend alternating with a limp.       Dry Needle Location [ (1-2 muscles)/ (3 or more muscles)]   Location Depth (\") Comment   popliteus 1    Anterior joint line 1 Med/lat to patellar tendon        Time 10 TRIAL     Therapeutic Exercises    39899 Units Comments   Prone quad stretch  Gentle repetitive   McConnel Tape left patella                    Timed Minutes 10       Therapy Education/Self Care 21289   Education offered today HEP and possible etiology of pain   Medbride Code    Ongoing HEP   Quad " sets  Quad stretches  Use of taping  Try knee sleeve   Timed Minutes        Total Timed Treatment:     10   mins  Total Time of Visit:            45   mins    ASSESSMENT/PLAN     GOALS:  Goals                                          Progress Note due by 8/26/23                                                      Recert due by 10/24/23   LTG by: 6 weeks Comments Date Status   Symmetrical prone knee flexion      Able to ascend flight of stairs symmetrically alternating steps      Reports no knee pain except occasional minor twinges no more than 1-2/10                      Assessment & Plan     Assessment  Impairments: abnormal gait, abnormal or restricted ROM, impaired physical strength, lacks appropriate home exercise program, pain with function and weight-bearing intolerance  Functional Limitations: walking, uncomfortable because of pain, standing and unable to perform repetitive tasks  Assessment details: Difficult to pin down exactly what her problem is. What reproduced her pain was patellar grinding but I wouldn't expect this to not have the mobility to step up a step. There's a chance there could be an internal derangement not teased out with the testing today that and MRI might .   She would benefit from PT.   Prognosis: good    Plan  Therapy options: will be seen for skilled therapy services  Planned modality interventions: dry needling, low level laser therapy and TENS  Planned therapy interventions: stretching, strengthening, manual therapy, neuromuscular re-education, gait training, home exercise program, joint mobilization, therapeutic activities, balance/weight-bearing training and soft tissue mobilization  Frequency: 2x week  Duration in weeks: 6  Treatment plan discussed with: patient  Plan details: Improve gait mechanics using assistive device if needed. Improve flexibility and hip/core stability. Progress to functional strengthening and ROM and progress HEP for the same.       SIGNATURE: [place]  ESTEFANY Cole PT, [place] License #: [id number]  Electronically Signed on 7/27/2023      Initial Certification  Certification Period: 7/27/2023 through 10/24/2023  I certify that the therapy services are furnished while this patient is under my care.  The services outlined above are required by this patient, and will be reviewed every 90 days.     PHYSICIAN: Damián Tobar PA (NPI: [id number])    Signature____________________________________________DATE: _________     Please sign and return via fax to [phone].   Thank you so much for letting us work with Miranda. I appreciate your letting us work with your patients. If you have any questions or concerns, please don't hesitate to contact me.          [address] [address]  [phone]

## 2023-08-01 ENCOUNTER — TREATMENT (OUTPATIENT)
Dept: PHYSICAL THERAPY | Facility: CLINIC | Age: 58
End: 2023-08-01
Payer: COMMERCIAL

## 2023-08-01 DIAGNOSIS — G89.29 CHRONIC PAIN OF LEFT KNEE: Primary | ICD-10-CM

## 2023-08-01 DIAGNOSIS — M25.562 CHRONIC PAIN OF LEFT KNEE: Primary | ICD-10-CM

## 2023-08-01 PROCEDURE — 97032 APPL MODALITY 1+ESTIM EA 15: CPT | Performed by: PHYSICAL THERAPIST

## 2023-08-01 PROCEDURE — 97140 MANUAL THERAPY 1/> REGIONS: CPT | Performed by: PHYSICAL THERAPIST

## 2023-08-03 ENCOUNTER — TREATMENT (OUTPATIENT)
Dept: PHYSICAL THERAPY | Facility: CLINIC | Age: 58
End: 2023-08-03
Payer: COMMERCIAL

## 2023-08-03 DIAGNOSIS — M25.562 CHRONIC PAIN OF LEFT KNEE: Primary | ICD-10-CM

## 2023-08-03 DIAGNOSIS — G89.29 CHRONIC PAIN OF LEFT KNEE: Primary | ICD-10-CM

## 2023-08-03 PROCEDURE — 97110 THERAPEUTIC EXERCISES: CPT | Performed by: PHYSICAL THERAPIST

## 2023-08-03 PROCEDURE — 97140 MANUAL THERAPY 1/> REGIONS: CPT | Performed by: PHYSICAL THERAPIST

## 2023-08-03 PROCEDURE — 97032 APPL MODALITY 1+ESTIM EA 15: CPT | Performed by: PHYSICAL THERAPIST

## 2023-08-05 ENCOUNTER — HOSPITAL ENCOUNTER (OUTPATIENT)
Dept: MRI IMAGING | Facility: HOSPITAL | Age: 58
Discharge: HOME | End: 2023-08-05
Admitting: PHYSICIAN ASSISTANT
Payer: COMMERCIAL

## 2023-08-05 DIAGNOSIS — M23.92 UNSPECIFIED INTERNAL DERANGEMENT OF LEFT KNEE: ICD-10-CM

## 2023-08-05 PROCEDURE — 73721 MRI JNT OF LWR EXTRE W/O DYE: CPT

## 2023-08-08 ENCOUNTER — TREATMENT (OUTPATIENT)
Dept: PHYSICAL THERAPY | Facility: CLINIC | Age: 58
End: 2023-08-08
Payer: COMMERCIAL

## 2023-08-08 DIAGNOSIS — G89.29 CHRONIC PAIN OF LEFT KNEE: Primary | ICD-10-CM

## 2023-08-08 DIAGNOSIS — M25.562 CHRONIC PAIN OF LEFT KNEE: Primary | ICD-10-CM

## 2023-08-08 PROCEDURE — 97535 SELF CARE MNGMENT TRAINING: CPT | Performed by: PHYSICAL THERAPIST

## 2023-08-08 PROCEDURE — 97032 APPL MODALITY 1+ESTIM EA 15: CPT | Performed by: PHYSICAL THERAPIST

## 2023-08-08 NOTE — PROGRESS NOTES
Physical Therapy Treatment Note  [address] [place] [address]    Patient: Miranda Elliott                                                 Visit Date: 2023  :     1965    Referring practitioner:    ROSEMARIE Ray  Date of Initial Visit:          Type: THERAPY  Noted: 2023    Patient seen for 4 sessions    Visit Diagnoses:    ICD-10-CM ICD-9-CM   1. Chronic pain of left knee  M25.562 719.46    G89.29 338.29     SUBJECTIVE     Subjective:She reports it's dull pressure and pain when she goes up steps       PAIN: 0/10         OBJECTIVE     Objective     Modalities Comments   Cold Laser Estim Combo L knee globally CIM reclined      Tx :3   Minutes 10     Manual Therapy     46401  Comments   Applied Sure prep in star pattern correction I strips for medial meniscal compartment issues 10 minutes not in [address] timed minutes code [id number] billed                    Timed Minutes 0           Therapy Education/Self Care 91430   Education offered today Reviewed MRI at length as well as discussed issues she should discuss with her surgeon and addressed her concerns as adequately as I could within the scope of PT.   Mary Code    Ongoing HEP   Quad sets  Quad stretches  Use of taping  Try knee sleeve   Timed Minutes 20       Total Timed Treatment:     30   mins  Total Time of Visit:             40   mins         ASSESSMENT/PLAN     GOALS  Goals                                          Progress Note due by 23                                                      Recert due by 10/24/23   LTG by: 6 weeks Comments Date Status   Symmetrical prone knee flexion         Able to ascend flight of stairs symmetrically alternating steps  reported pressure and dull ache post session ascending curb.    Ongoing   Reports no knee pain except occasional minor twinges no more than 1-2/10  2/10  8/3  Ongoing       Assessment/Plan     ASSESSMENT:    Her MRI results are a little mixed in regards to her issues. There is a Bakers cyst so pain and pressure reports make a lot of sense. It couldn't be confirmed that there is a medial meniscal tear but in the report it was worded as there was a suspected tear. I advised her of the surgical success rate of Bakers cyst removal and advised her of the issues we still need to address within her PT POC.     PLAN:   Focus on recruiting the VMO, follow up her OWIK visit same day of next session and perform STM to the ITB.    SIGNATURE: Ramana Tinoco PTA, [place] License #: [id number]  Electronically Signed on 8/8/2023        [address] [place] [address] [place] [address]  [phone]

## 2023-08-10 ENCOUNTER — TREATMENT (OUTPATIENT)
Dept: PHYSICAL THERAPY | Facility: CLINIC | Age: 58
End: 2023-08-10
Payer: COMMERCIAL

## 2023-08-10 DIAGNOSIS — M25.562 CHRONIC PAIN OF LEFT KNEE: Primary | ICD-10-CM

## 2023-08-10 DIAGNOSIS — G89.29 CHRONIC PAIN OF LEFT KNEE: Primary | ICD-10-CM

## 2023-08-10 PROCEDURE — 97140 MANUAL THERAPY 1/> REGIONS: CPT | Performed by: PHYSICAL THERAPIST

## 2023-08-10 PROCEDURE — 97110 THERAPEUTIC EXERCISES: CPT | Performed by: PHYSICAL THERAPIST

## 2023-08-10 PROCEDURE — 97032 APPL MODALITY 1+ESTIM EA 15: CPT | Performed by: PHYSICAL THERAPIST

## 2023-08-10 NOTE — PROGRESS NOTES
Physical Therapy Treatment Note  [address] [place] [address]    Patient: Miranda Elliott                                                 Visit Date: 8/10/2023  :     1965    Referring practitioner:    ROSEMARIE Ray  Date of Initial Visit:          Type: THERAPY  Noted: 2023    Patient seen for 5 sessions    Visit Diagnoses:    ICD-10-CM ICD-9-CM   1. Chronic pain of left knee  M25.562 719.46    G89.29 338.29     SUBJECTIVE     Subjective:She reports she will have another follow up with Dr. Tovar in 4 weeks. They told her surgery isn't required at this time.      PAIN: 0/10         OBJECTIVE     Objective     Modalities Comments   Cold Laser Estim Combo L knee globally CIM reclined      Tx :3   Minutes 10     Therapeutic Exercises    03160 Units Comments   Resisted retro walk with TRX Rip Trainer x 40 ft x 4     Resisted B side stepping with TRX Rip Trainer x 40 ft x 2                    Timed Minutes 20      Manual Therapy     23834  Comments   STM with Powerboost L1 bulb attachment L ITB                     Timed Minutes 15           Therapy Education/Self Care 55475   Education offered today    Medbride Code    Ongoing HEP   Quad sets  Quad stretches  Use of taping  Try knee sleeve   Timed Minutes        Total Timed Treatment:     45   mins  Total Time of Visit:             45   mins         ASSESSMENT/PLAN     GOALS  Goals                                          Progress Note due by 23                                                      Recert due by 10/24/23   LTG by: 6 weeks Comments Date Status   Symmetrical prone knee flexion         Able to ascend flight of stairs symmetrically alternating steps  reported pressure and dull ache post session ascending curb.    Ongoing   Reports no knee pain except occasional minor twinges no more than 1-2/10  2/10  8/3  Ongoing       Assessment/Plan     ASSESSMENT:   Based on  her follow up with DONNY earlier today not only is surgery not necessary but we are likely headed in the right direction with her POC.    PLAN:   Consider using stim on the L O    SIGNATURE: Ramana Tinoco [facility], [place] License #: [id number]  Electronically Signed on 8/10/2023        [address] [place] [address]  [phone]

## 2023-08-15 ENCOUNTER — TREATMENT (OUTPATIENT)
Dept: PHYSICAL THERAPY | Facility: CLINIC | Age: 58
End: 2023-08-15
Payer: COMMERCIAL

## 2023-08-15 DIAGNOSIS — M25.562 CHRONIC PAIN OF LEFT KNEE: Primary | ICD-10-CM

## 2023-08-15 DIAGNOSIS — G89.29 CHRONIC PAIN OF LEFT KNEE: Primary | ICD-10-CM

## 2023-08-15 PROCEDURE — 97140 MANUAL THERAPY 1/> REGIONS: CPT | Performed by: PHYSICAL THERAPIST

## 2023-08-15 PROCEDURE — 97110 THERAPEUTIC EXERCISES: CPT | Performed by: PHYSICAL THERAPIST

## 2023-08-15 NOTE — PROGRESS NOTES
Physical Therapy Treatment Note  [address] [place] [address]    Patient: Miranda Elliott                                                 Visit Date: 8/15/2023  :     1965    Referring practitioner:    ROSEMARIE Ray  Date of Initial Visit:          Type: THERAPY  Noted: 2023    Patient seen for 6 sessions    Visit Diagnoses:    ICD-10-CM ICD-9-CM   1. Chronic pain of left knee  M25.562 719.46    G89.29 338.29     SUBJECTIVE     Subjective:No new complaints       PAIN: 0/10         OBJECTIVE     Objective     Therapeutic Exercises    36213 Units Comments   Unicam seat #  6 res 2 x 8 min      Retro walk on treadmill .8 mph 8% incline 6 min     Attempted basketball squat with blue weighted Medball but not great form     Wall ball squat with 45 cm SB x 10     Retro stool push x 40 ft x 2     Standing L TKE with green T band x 20           Timed Minutes 31      Manual Therapy     11621  Comments   STM with Powerboost L1 bulb attachment L ITB                              Timed Minutes 10       Therapy Education/Self Care 48402   Education offered today    Medbride Code    Ongoing HEP   Standing L TKE with green T band x 20 daily  Quad stretches  Use of taping  Try knee sleeve   Timed Minutes        Total Timed Treatment:     41   mins  Total Time of Visit:             41   mins         ASSESSMENT/PLAN     GOALS  Goals                                          Progress Note due by 23                                                      Recert due by 10/24/23   LTG by: 6 weeks Comments Date Status   Symmetrical prone knee flexion         Able to ascend flight of stairs symmetrically alternating steps  reported pressure and dull ache post session ascending curb.    Ongoing   Reports no knee pain except occasional minor twinges no more than 1-2/10  2/10  8/3  Ongoing       Assessment/Plan     ASSESSMENT:   Her squat mechanics  are a bit poor but we can address this over time. Squatting also refers a little pressure and discomfort in the medial compartment.    PLAN:   Consider KT application for VMO activation facilitation as well as LAQ's on Nautilus    SIGNATURE: Ramana Tinoco [facility], [place] License #: [id number]  Electronically Signed on 8/15/2023        [address] [place] [address]  [phone]

## 2023-08-17 ENCOUNTER — TREATMENT (OUTPATIENT)
Dept: PHYSICAL THERAPY | Facility: CLINIC | Age: 58
End: 2023-08-17
Payer: COMMERCIAL

## 2023-08-17 DIAGNOSIS — G89.29 CHRONIC PAIN OF LEFT KNEE: Primary | ICD-10-CM

## 2023-08-17 DIAGNOSIS — M25.562 CHRONIC PAIN OF LEFT KNEE: Primary | ICD-10-CM

## 2023-08-17 NOTE — PROGRESS NOTES
Physical Therapy Treatment Note  [address] [place] [address]    Patient: Mirnada Elliott                                                 Visit Date: 2023  :     1965    Referring practitioner:    ROSEMARIE Ray  Date of Initial Visit:          Type: THERAPY  Noted: 2023    Patient seen for 7 sessions    Visit Diagnoses:    ICD-10-CM ICD-9-CM   1. Chronic pain of left knee  M25.562 719.46    G89.29 338.29     SUBJECTIVE     Subjective: Pt reports that she has had some needle like pain in the back of her knee occasionally, but is fine walking. TTP in bakers cyst area.       PAIN: 0/10, 2/10 when needle like pain occurs.          OBJECTIVE     Objective     Therapeutic Exercises    92597 Units Comments   Shuttle press with 2# ball between knees  5 min 6 bands    SL shuttle press with red TB varus force 5 min  4 bands; To engage VMO    TRX mini lunges with red TB varus force X10  To engage VMO    LAQ's on Nautilus 5 min  16.7    KT tape application to facilitate VMO   Pg 200   Timed Minutes 40 min          Therapy Education/Self Care 23116   Education offered today    Medbride Code    Ongoing HEP   Standing L TKE with green T band x 20 daily  Quad stretches  Use of taping  Try knee sleeve   Timed Minutes        Total Timed Treatment:     40   mins  Total Time of Visit:             40   mins         ASSESSMENT/PLAN     GOALS  Goals                                          Progress Note due by 23                                                      Recert due by 10/24/23   LTG by: 6 weeks Comments Date Status   Symmetrical prone knee flexion         Able to ascend flight of stairs symmetrically alternating steps  reported pressure and dull ache post session ascending curb.    Ongoing   Reports no knee pain except occasional minor twinges no more than 1-2/10  2/10 when twinges occur    Ongoing        Assessment/Plan     ASSESSMENT:  Pt tolerated VMO focused treatment well today. We are continuing to work on mechanics with closed chain activities as well. KT tape applied to facilitate VMO.      PLAN:   Assess response to KT application for VMO activation facilitation. Continue with VMO activation and LE stability as tolerated, progressing to higher level activities and functional strengthening as tolerated.     SIGNATURE: Nadiya Funes, PT, [place] License #: [id number]    Electronically Signed on 8/17/2023        [address] [facility]  [address] [address]  [phone]

## 2023-08-22 ENCOUNTER — TREATMENT (OUTPATIENT)
Dept: PHYSICAL THERAPY | Facility: CLINIC | Age: 58
End: 2023-08-22
Payer: COMMERCIAL

## 2023-08-22 DIAGNOSIS — G89.29 CHRONIC PAIN OF LEFT KNEE: Primary | ICD-10-CM

## 2023-08-22 DIAGNOSIS — M25.562 CHRONIC PAIN OF LEFT KNEE: Primary | ICD-10-CM

## 2023-08-22 NOTE — PROGRESS NOTES
Physical Therapy Treatment Note and 30 Day Progress Note  [address] [place] [address]    Patient: Miranda Elliott                                                 Visit Date: 2023  :     1965    Referring practitioner:    ROSEMARIE Ray  Date of Initial Visit:          Type: THERAPY  Noted: 2023    Patient seen for 8 sessions    Visit Diagnoses:    ICD-10-CM ICD-9-CM   1. Chronic pain of left knee  M25.562 719.46    G89.29 338.29     SUBJECTIVE     Subjective: PROGRESS NOTE   Pt reports that she has been fine since last visit. She is able to ascend stairs, but it is still painful to lead with the LLE. She couldn't tell if the VMO facilitation tape helped her or not.       PAIN: 0/10         OBJECTIVE     Objective     Manual Therapy     26539  Comments   NMR Hamstring inhibition  R 16 > 11  L 23 > 15                   Timed Minutes 8     Therapeutic Exercises    89693 Units Comments   Supine bridges with ball  2x15x2'    Incline squats with ball on wall  2x10  Within pain free range    Standing Knee flexion  2x10  3# ankle weight    Hip flexor stretch with PB 10x15' Standing at raised PB for support   LLE Stool pulls x50' Used RLE to stabilize    LLE stool pushes  x50' + resistance of pushing PT on additional stool behind her    Timed Minutes 38              LE MMT   HIP Strength L Strength R   KNEE     flexion 4 4+   extension 5 5   *pain           Therapy Education/Self Care 42039   Education offered today    Medbride Code    Ongoing HEP   Standing L TKE with green T band x 20 daily  Quad stretches  Use of taping  Try knee sleeve   Timed Minutes        Total Timed Treatment:     46   mins  Total Time of Visit:             46   mins         ASSESSMENT/PLAN     GOALS  Goals                                          Progress Note due by 23                                                      Recert due by 10/24/23    LTG by: 6 weeks Comments Date Status   Symmetrical prone knee flexion  R > L, greatly improved   8/22  Ongoing   Able to ascend flight of stairs symmetrically alternating steps  reported dull ache 1/10 pain with ascend/descend full flight of stairs. Continues to lightly favor it, resulting in asymmetrical pattern.   8/22  Ongoing   Reports no knee pain except occasional minor twinges no more than 1-2/10  1/10 when twinges occur  8/22  MET       Assessment & Plan       Assessment  Impairments: abnormal gait, abnormal or restricted ROM, impaired physical strength, lacks appropriate home exercise program, pain with function and weight-bearing intolerance   Functional limitations: walking, uncomfortable because of pain, standing and unable to perform repetitive tasks   Prognosis: good    Plan  Therapy options: will be seen for skilled therapy services  Planned modality interventions: dry needling, low level laser therapy and TENS  Planned therapy interventions: stretching, strengthening, manual therapy, neuromuscular re-education, gait training, home exercise program, joint mobilization, therapeutic activities, balance/weight-bearing training and soft tissue mobilization  Frequency: 2x week  Duration in weeks: 6  Treatment plan discussed with: patient           ASSESSMENT: Progress note performed today. She has met one goal relating to pain, and is progressing well toward her remaining goals. Pt did well with functional strengthening and stretching activities today, and especially benefited from hamstring and quad strengthening activities using stools. She would benefit from skilled PTx to decrease pain, improve functional mobility, progress toward her goals, and increase overall quality of life.        PLAN:   Continue with VMO activation and LE stability as tolerated, progressing to higher level activities and functional strengthening as tolerated.    SIGNATURE: Nadiya Funes, PT, [place] License #:  [id number]    Electronically Signed on 8/22/2023        [address] [place] [address]  [phone]

## 2023-08-25 ENCOUNTER — TREATMENT (OUTPATIENT)
Dept: PHYSICAL THERAPY | Facility: CLINIC | Age: 58
End: 2023-08-25
Payer: COMMERCIAL

## 2023-08-25 DIAGNOSIS — G89.29 CHRONIC PAIN OF LEFT KNEE: Primary | ICD-10-CM

## 2023-08-25 DIAGNOSIS — M25.562 CHRONIC PAIN OF LEFT KNEE: Primary | ICD-10-CM

## 2023-08-25 PROCEDURE — 97110 THERAPEUTIC EXERCISES: CPT | Performed by: PHYSICAL THERAPIST

## 2023-08-25 PROCEDURE — 97112 NEUROMUSCULAR REEDUCATION: CPT | Performed by: PHYSICAL THERAPIST

## 2023-08-25 NOTE — PROGRESS NOTES
"                                                                Physical Therapy Treatment Note  [address] [place] [address]    Patient: Miranda Elliott                                                 Visit Date: 2023  :     1965    Referring practitioner:    ROSEMARIE Ray  Date of Initial Visit:          Type: THERAPY  Noted: 2023    Patient seen for 9 sessions    Visit Diagnoses:    ICD-10-CM ICD-9-CM   1. Chronic pain of left knee  M25.562 719.46    G89.29 338.29     SUBJECTIVE     Subjective:She denies knee pain but has a little in her low back      PAIN: 0.5/10         OBJECTIVE     Objective     Therapeutic Exercises    83320 Units Comments   B LE Shuttle Press with #6 ball between knees 6 bands x 5 min     L LE Shuttle Press with 5 bands x 5 min     Resisted retro walk with TRX Rip Trainer x 40 ft x 6               Timed Minutes 15        Neuromuscular Reeducation     01877 Comments   Bounding on Shuttle Press 2 bands x 20    Deceleaction jumps with 4\" box x 12     L SLS on 4 all with blue ball            Timed Minutes 25      Therapy Education/Self Care 15849   Education offered today    Joseloe Code    Ongoing HEP   Standing L TKE with green T band x 20 daily  Quad stretches  Use of taping  Try knee sleeve   Timed Minutes        Total Timed Treatment:     40   mins  Total Time of Visit:             40   mins         ASSESSMENT/PLAN     GOALS  Goals                                          Progress Note due by 23                                                      Recert due by 10/24/23   LTG by: 6 weeks Comments Date Status   Symmetrical prone knee flexion  R > L, greatly improved     Ongoing   Able to ascend flight of stairs symmetrically alternating steps  reported dull ache 1/10 pain with ascend/descend full flight of stairs. Continues to lightly favor it, resulting in asymmetrical pattern.     Ongoing   Reports no knee pain except occasional minor twinges " no more than 1-2/10  1/10 when twinges occur  8/22  MET       Assessment/Plan     ASSESSMENT:   She continues to do well but still has some occasional movement apprehension. She struggled with the deceleration jumps today.     PLAN:   Continue to focus on LVMO activation and recruitment.    SIGNATURE: Ramana Tinoco [facility], [place] License #: [id number]  Electronically Signed on 8/25/2023        [address] [place] [address]. [address]  [phone]

## 2023-08-29 ENCOUNTER — TREATMENT (OUTPATIENT)
Dept: PHYSICAL THERAPY | Facility: CLINIC | Age: 58
End: 2023-08-29
Payer: COMMERCIAL

## 2023-08-29 DIAGNOSIS — G89.29 CHRONIC PAIN OF LEFT KNEE: Primary | ICD-10-CM

## 2023-08-29 DIAGNOSIS — M25.562 CHRONIC PAIN OF LEFT KNEE: Primary | ICD-10-CM

## 2023-08-29 NOTE — PROGRESS NOTES
Physical Therapy Treatment Note  [address] [place] [address]    Patient: Miranda Elliott                                                 Visit Date: 2023  :     1965    Referring practitioner:    ROSEMARIE Ray  Date of Initial Visit:          Type: THERAPY  Noted: 2023    Patient seen for 10 sessions    Visit Diagnoses:    ICD-10-CM ICD-9-CM   1. Chronic pain of left knee  M25.562 719.46    G89.29 338.29     SUBJECTIVE     Subjective: She reports that one of her hamstring tendons have been bothering her occasionally since last visit.       PAIN: 0/10         OBJECTIVE     Objective     Therapeutic Exercises    68288 Units Comments   B LE Shuttle Press with #9 ball between knees 8 bands x 5 min     L LE Shuttle Press with 8>7>6 bands x 5 min with yellow TB lateral pull   To engage VMO    Resisted retro walk up incline outside with TRX Rip Trainer x 40 ft x 6     LLE only Stool pushes 25' x2 with added weight of PT on stool behind her      LLE only Stool pulls 25' x2     Timed Minutes 30      Manual Therapy     24899  Comments   NMR Hamstring inhibition  R 16 > 11  L 23 > 15                   Timed Minutes 10           Therapy Education/Self Care 00381   Education offered today Inflammatory process    Medbride Code    Ongoing HEP   Standing L TKE with green T band x 20 daily  Quad stretches  Use of taping  Try knee sleeve   Timed Minutes        Total Timed Treatment:     40   mins  Total Time of Visit:             40   mins         ASSESSMENT/PLAN     GOALS  Goals                                          Progress Note due by 23                                                      Recert due by 10/24/23   LTG by: 6 weeks Comments Date Status   Symmetrical prone knee flexion  R > L, greatly improved     Ongoing   Able to ascend flight of stairs symmetrically alternating steps  reported dull ache 1/10 pain with  ascend/descend full flight of stairs. Continues to lightly favor it, resulting in asymmetrical pattern.   8/22  Ongoing   Reports no knee pain except occasional minor twinges no more than 1-2/10  1/10 when twinges occur  8/22  MET       Assessment/Plan     ASSESSMENT: She is progressing well with PTx. She responds well to hamstring inhibition techniques, as this loosens her LE and decreases any pain that she may have.     PLAN:   Continue to focus on LVMO activation and recruitment.    SIGNATURE: Nadiya Funes, PT, [place] License #: [id number]    Electronically Signed on 8/29/2023        [address] [place] [address]. [address]  [phone]

## 2023-08-31 ENCOUNTER — TREATMENT (OUTPATIENT)
Dept: PHYSICAL THERAPY | Facility: CLINIC | Age: 58
End: 2023-08-31
Payer: COMMERCIAL

## 2023-08-31 DIAGNOSIS — G89.29 CHRONIC PAIN OF LEFT KNEE: Primary | ICD-10-CM

## 2023-08-31 DIAGNOSIS — M23.92 INTERNAL DERANGEMENT OF KNEE, LEFT: ICD-10-CM

## 2023-08-31 DIAGNOSIS — M25.562 CHRONIC PAIN OF LEFT KNEE: Primary | ICD-10-CM

## 2023-08-31 NOTE — PROGRESS NOTES
Physical Therapy Treatment Note  [address] [place] [address]    Patient: Miranda Elliott                                                 Visit Date: 2023  :     1965    Referring practitioner:    ROSEMARIE Ray  Date of Initial Visit:          Type: THERAPY  Noted: 2023    Patient seen for 11 sessions    Visit Diagnoses:    ICD-10-CM ICD-9-CM   1. Chronic pain of left knee  M25.562 719.46    G89.29 338.29   2. Internal derangement of knee, left  M23.92 717.9     SUBJECTIVE     Subjective: Her knee feels the same, is doing okay with stairs. She needs to leave ~5 mins early for a dentist appt.      PAIN: 0/10         OBJECTIVE     Objective     Therapeutic Exercises    75807 Units Comments   VMO SLR 2*10 4#   Wall ball SLR 2*10    Reverse banded squat 15 Green TB   Iso hip ABD w/ SB wall squat 15 Green TB   Retro walking on TM 8% incline 6 mins    Crab walk w/ rip  30'*2    Timed Minutes 40       Therapy Education/Self Care 11539   Education offered today Inflammatory process    Medbride Code    Ongoing HEP   Standing L TKE with green T band x 20 daily  Quad stretches  Use of taping  Try knee sleeve   Timed Minutes        Total Timed Treatment:     40   mins  Total Time of Visit:             40   mins         ASSESSMENT/PLAN     GOALS  Goals                                          Progress Note due by 23                                                      Recert due by 10/24/23   LTG by: 6 weeks Comments Date Status   Symmetrical prone knee flexion  R > L, greatly improved     Ongoing   Able to ascend flight of stairs symmetrically alternating steps  reported dull ache 1/10 pain with ascend/descend full flight of stairs. Continues to lightly favor it, resulting in asymmetrical pattern.     Ongoing   Reports no knee pain except occasional minor twinges no more than 1-2/10  1/10 when twinges occur     MET       Assessment/Plan     ASSESSMENT: Emphasis placed on exercises that can be performed for VMO emphasis d/t >1 month hiatus in care d/t schedule availability.     PLAN: Continue to focus on LVMO activation and recruitment.    SIGNATURE: Nava Fernandez, PT, [place] License #: [id number]  Electronically Signed on 8/31/2023        [address]. [address]  [phone]

## 2023-11-21 ENCOUNTER — OFFICE VISIT (OUTPATIENT)
Dept: BARIATRICS/WEIGHT MGMT | Facility: CLINIC | Age: 58
End: 2023-11-21
Payer: COMMERCIAL

## 2023-11-21 VITALS
HEIGHT: 66 IN | TEMPERATURE: 98.2 F | DIASTOLIC BLOOD PRESSURE: 63 MMHG | OXYGEN SATURATION: 95 % | HEART RATE: 62 BPM | BODY MASS INDEX: 36.48 KG/M2 | WEIGHT: 227 LBS | SYSTOLIC BLOOD PRESSURE: 117 MMHG

## 2023-11-21 DIAGNOSIS — E66.09 CLASS 2 OBESITY DUE TO EXCESS CALORIES WITHOUT SERIOUS COMORBIDITY WITH BODY MASS INDEX (BMI) OF 36.0 TO 36.9 IN ADULT: Primary | ICD-10-CM

## 2023-11-21 PROCEDURE — 99214 OFFICE O/P EST MOD 30 MIN: CPT | Performed by: NURSE PRACTITIONER

## 2023-11-21 NOTE — PROGRESS NOTES
Patient Care Team:  Provider, No Known as PCP - Luzmaria Morrow APRN as Nurse Practitioner (Obstetrics and Gynecology)      Subjective     Patient is a 58 y.o. female presents with morbid obesity and her Body mass index is 36.92 kg/m².     Patient was referred to our practice by Self .  She is here for discussion of surgical weight loss options.  She stated she has been with the disease of obesity for more than 10 years.  She stated she suffers from  morbid obesity due to her weight gain.  She stated that weight loss helps alleviate these symptoms.   she has tried weight watchers  and phentermine. she stated that she has attempted these conservative methods for weight loss without maintaining long term success.  Today she would like to discuss surgical weight loss options such as the Laparoscopic Sleeve Gastrectomy or the Laparoscopic R - Y Gastric Bypass.     she states the weight worsened around 20 years ago and the highest weight was 242 lbs.  She states she was on Wegovy and lost 40 lbs but stopped medication due to gallbladder and digestive issues.       Review of Systems   Constitutional: Negative.    Respiratory: Negative.     Cardiovascular: Negative.    Gastrointestinal: Negative.    Endocrine: Negative.    Musculoskeletal: Negative.    Psychiatric/Behavioral: Negative.          History  Past Medical History:   Diagnosis Date    Abnormal Pap smear of cervix     Anxiety     Cervical dysplasia     Medial meniscus tear     Ovarian cyst     Dont remember the date    PMS (premenstrual syndrome)     Polycystic ovary syndrome     Possibly?    Recurrent pregnancy loss, antepartum condition or complication Dec 1998    Seasonal allergies     Urinary tract infection 1990    Varicella 1979      Past Surgical History:   Procedure Laterality Date    BACK SURGERY  1998    Lumbar laminectomy L5-S1    BACK SURGERY  2012    lumbar fusion L5-S1    CHOLECYSTECTOMY WITH INTRAOPERATIVE CHOLANGIOGRAM N/A 05/16/2022     Procedure: LAPAROSCOPIC CHOLECYSTECTOMY;  Surgeon: Laura Rod MD;  Location:  [facility];  Service: General;  Laterality: N/A;    COLONOSCOPY      D & C HYSTEROSCOPY      DILATATION AND CURETTAGE      ENDOMETRIAL ABLATION  2010    in office    HAND TENDON SURGERY Left     TENDON GRAFT    KNEE ARTHROSCOPY Left 2021    Procedure: LEFT KNEE ARTHROSCOPIC PARTIAL MEDIAL MENISECTOMY;  Surgeon: Kevin Tovar MD;  Location:  [facility];  Service: Orthopedics;  Laterality: Left;    LAPAROSCOPIC CHOLECYSTECTOMY  May 2022    OVARIAN CYST SURGERY  2010    SALPINGO OOPHORECTOMY Right     TONSILLECTOMY  1970    and adenoids    WISDOM TOOTH EXTRACTION        Social History     Socioeconomic History    Marital status:    Tobacco Use    Smoking status: Former     Packs/day: 1.00     Years: 22.00     Additional pack years: 0.00     Total pack years: 22.00     Types: Cigarettes     Start date:      Quit date: 2022     Years since quittin.4    Smokeless tobacco: Never   Vaping Use    Vaping Use: Former   Substance and Sexual Activity    Alcohol use: Yes     Comment: occ    Drug use: Never    Sexual activity: Yes     Partners: Male     Birth control/protection: Post-menopausal      Family History   Problem Relation Age of Onset    Diabetes Father         Type 2    Kidney cancer Mother     No Known Problems Sister     Prostate cancer Maternal Grandfather     Breast cancer Neg Hx     Ovarian cancer Neg Hx     Uterine cancer Neg Hx     Colon cancer Neg Hx     Melanoma Neg Hx       Allergies   Allergen Reactions    Lortab [Hydrocodone-Acetaminophen] Nausea Only     Pt states couldn't empty bladder and was very nauseated---PT CAN TOLERATE PERCOCET          Current Outpatient Medications:     ALPRAZolam (XANAX) 1 MG tablet, Take 1 tablet by mouth 3 (Three) Times a Day As Needed for Anxiety., Disp: 90 tablet, Rfl: 0    vitamin D (ERGOCALCIFEROL) 1.25 MG (92173 UT) capsule capsule, Take 1 capsule  by mouth 1 (One) Time Per Week., Disp: 5 capsule, Rfl: 3    nystatin-triamcinolone (MYCOLOG) 434815-7.1 UNIT/GM-% ointment, Apply 1 application  topically to the appropriate area as directed 2 (Two) Times a Day., Disp: 30 g, Rfl: 3    Objective     Vital Signs     Body mass index is 36.92 kg/m².      11/21/23  1424   Weight: 103 kg (227 lb)            Physical Exam  Vitals reviewed.   Constitutional:       Appearance: She is obese.   Cardiovascular:      Rate and Rhythm: Normal rate and regular rhythm.   Pulmonary:      Effort: Pulmonary effort is normal.   Abdominal:      General: Bowel sounds are normal.      Palpations: Abdomen is soft.   Musculoskeletal:         General: Normal range of motion.   Skin:     General: Skin is warm and dry.   Neurological:      Mental Status: She is alert and oriented to person, place, and time.   Psychiatric:         Mood and Affect: Mood normal.         Behavior: Behavior normal.            Results Review:   I reviewed the patient's new clinical results.      Assessment & Plan   Diagnoses and all orders for this visit:    1. Class 2 obesity due to excess calories without serious comorbidity with body mass index (BMI) of 36.0 to 36.9 in adult (Primary)  Assessment & Plan:  Patient's (Body mass index is 36.92 kg/m².) indicates that they are morbidly/severely obese (BMI > 40 or > 35 with obesity - related health condition) with health conditions that include none . Weight is unchanged. BMI  is above average; BMI management plan is completed. We discussed portion control and increasing exercise.           I discussed the patient's findings and my recommendations with patient.   Today the patient  received the 4 meals/day diet prescription, which was explained to patient.  Patient will see the dietitian today to further discuss goals for diet, exercise, and lifestyle. Patient has received intensive behavioral therapy for obesity today. I explained the pathophysiology of the disease and  its storage component. We also discussed Dr. Jarrell' pearls of the program. Nutrition counseling ordered today.      Aisha Eric, APRN     11/28/23  08:44 CST    A total of 30 minutes was spent face to face with this patient and over half of the time was spent on counseling and coordination of care for the disease of obesity. We specifically reviewed the dietary prescription and I made recommendations toward increasing exercise as tolerated as well as focusing on training their behavior toward storing less.  Patient will begin GREEN meal plan.

## 2023-11-27 RX ORDER — NYSTATIN AND TRIAMCINOLONE ACETONIDE 100000; 1 [USP'U]/G; MG/G
1 OINTMENT TOPICAL 2 TIMES DAILY
Qty: 30 G | Refills: 3 | Status: SHIPPED | OUTPATIENT
Start: 2023-11-27

## 2023-11-28 PROBLEM — E66.01 CLASS 2 SEVERE OBESITY DUE TO EXCESS CALORIES WITH SERIOUS COMORBIDITY AND BODY MASS INDEX (BMI) OF 36.0 TO 36.9 IN ADULT: Status: ACTIVE | Noted: 2023-11-28

## 2023-11-28 PROBLEM — E66.812 CLASS 2 SEVERE OBESITY DUE TO EXCESS CALORIES WITH SERIOUS COMORBIDITY AND BODY MASS INDEX (BMI) OF 36.0 TO 36.9 IN ADULT: Status: ACTIVE | Noted: 2023-11-28

## 2023-11-28 NOTE — ASSESSMENT & PLAN NOTE
Patient's (Body mass index is 36.92 kg/m².) indicates that they are morbidly/severely obese (BMI > 40 or > 35 with obesity - related health condition) with health conditions that include none . Weight is unchanged. BMI  is above average; BMI management plan is completed. We discussed portion control and increasing exercise.

## 2023-11-30 RX ORDER — NYSTATIN 100000 U/G
1 CREAM TOPICAL 3 TIMES DAILY
Qty: 30 G | Refills: 3 | Status: SHIPPED | OUTPATIENT
Start: 2023-11-30

## 2023-11-30 RX ORDER — TRIAMCINOLONE ACETONIDE 5 MG/G
1 CREAM TOPICAL 2 TIMES DAILY
Qty: 45 G | Refills: 3 | Status: SHIPPED | OUTPATIENT
Start: 2023-11-30

## 2024-03-05 ENCOUNTER — OFFICE VISIT (OUTPATIENT)
Dept: OBSTETRICS AND GYNECOLOGY | Age: 59
End: 2024-03-05
Payer: COMMERCIAL

## 2024-03-05 VITALS
SYSTOLIC BLOOD PRESSURE: 124 MMHG | BODY MASS INDEX: 36.48 KG/M2 | HEIGHT: 66 IN | WEIGHT: 227 LBS | DIASTOLIC BLOOD PRESSURE: 82 MMHG

## 2024-03-05 DIAGNOSIS — Z13.820 ENCOUNTER FOR SCREENING FOR OSTEOPOROSIS: ICD-10-CM

## 2024-03-05 DIAGNOSIS — Z01.419 WELL WOMAN EXAM WITH ROUTINE GYNECOLOGICAL EXAM: Primary | ICD-10-CM

## 2024-03-05 DIAGNOSIS — F17.200 SMOKER: ICD-10-CM

## 2024-03-05 DIAGNOSIS — Z12.31 ENCOUNTER FOR SCREENING MAMMOGRAM FOR BREAST CANCER: ICD-10-CM

## 2024-03-05 DIAGNOSIS — N95.1 MENOPAUSAL SYMPTOMS: ICD-10-CM

## 2024-03-05 PROCEDURE — G0123 SCREEN CERV/VAG THIN LAYER: HCPCS | Performed by: NURSE PRACTITIONER

## 2024-03-05 PROCEDURE — 99396 PREV VISIT EST AGE 40-64: CPT | Performed by: NURSE PRACTITIONER

## 2024-03-05 PROCEDURE — 87624 HPV HI-RISK TYP POOLED RSLT: CPT | Performed by: NURSE PRACTITIONER

## 2024-03-05 PROCEDURE — 99459 PELVIC EXAMINATION: CPT | Performed by: NURSE PRACTITIONER

## 2024-03-05 NOTE — PROGRESS NOTES
"Subjective     Miranda Elliott is a 58 y.o. female    History of Present Illness  Patient is here today for yearly checkup.  She has complaints of weight gain and is recently started weight watchers.  She has tried Wegovy in the past and did not have good results.  Gynecologic Exam  The patient's pertinent negatives include no pelvic pain, vaginal bleeding or vaginal discharge. The patient is experiencing no pain. Pertinent negatives include no abdominal pain, anorexia, back pain, chills, constipation, diarrhea, discolored urine, dysuria, fever, flank pain, frequency, headaches, hematuria, joint pain, joint swelling, nausea, painful intercourse, rash, sore throat, urgency or vomiting. Nothing aggravates the symptoms. She is sexually active. She is postmenopausal. The maximum temperature recorded prior to her arrival was no fever.         /82   Ht 167 cm (65.75\")   Wt 103 kg (227 lb)   LMP  (LMP Unknown)   BMI 36.92 kg/m²     Outpatient Encounter Medications as of 3/5/2024   Medication Sig Dispense Refill    ALPRAZolam (XANAX) 1 MG tablet Take 1 tablet by mouth 3 (Three) Times a Day As Needed for Anxiety. 90 tablet 0    nystatin (MYCOSTATIN) 600366 UNIT/GM cream Apply 1 application  topically to the appropriate area as directed 3 (Three) Times a Day. 30 g 3    triamcinolone (KENALOG) 0.5 % cream Apply 1 application  topically to the appropriate area as directed 2 (Two) Times a Day. 45 g 3    vitamin D (ERGOCALCIFEROL) 1.25 MG (64149 UT) capsule capsule Take 1 capsule by mouth 1 (One) Time Per Week. 5 capsule 3    [DISCONTINUED] nystatin-triamcinolone (MYCOLOG) 093622-5.1 UNIT/GM-% ointment Apply 1 application  topically to the appropriate area as directed 2 (Two) Times a Day. 30 g 3     No facility-administered encounter medications on file as of 3/5/2024.       Past Medical History  Past Medical History:   Diagnosis Date    Abnormal Pap smear of cervix     Anxiety     Cervical dysplasia     Medial " meniscus tear     Ovarian cyst     PMS (premenstrual syndrome)     Polycystic ovary syndrome     Recurrent pregnancy loss, antepartum condition or complication Dec 1998    Seasonal allergies     Urinary tract infection 1990    Varicella 1979       Surgical History  Past Surgical History:   Procedure Laterality Date    BACK SURGERY  1998    Lumbar laminectomy L5-S1    BACK SURGERY  2012    lumbar fusion L5-S1    CHOLECYSTECTOMY WITH INTRAOPERATIVE CHOLANGIOGRAM N/A 05/16/2022    Procedure: LAPAROSCOPIC CHOLECYSTECTOMY;  Surgeon: Laura Rod MD;  Location:  [facility] OR;  Service: General;  Laterality: N/A;    COLONOSCOPY  2012    D & C HYSTEROSCOPY  1998    DILATATION AND CURETTAGE      ENDOMETRIAL ABLATION  2010    in office    HAND TENDON SURGERY Left 1982    TENDON GRAFT    KNEE ARTHROSCOPY Left 07/22/2021    Procedure: LEFT KNEE ARTHROSCOPIC PARTIAL MEDIAL MENISECTOMY;  Surgeon: Kevin Tovar MD;  Location:  [facility] OR;  Service: Orthopedics;  Laterality: Left;    LAPAROSCOPIC CHOLECYSTECTOMY  May 2022    OVARIAN CYST SURGERY  2010    SALPINGO OOPHORECTOMY Right 2011    TONSILLECTOMY  1970    and adenoids    WISDOM TOOTH EXTRACTION  1980       Family History  Family History   Problem Relation Age of Onset    Diabetes Father         Type 2    Kidney cancer Mother     No Known Problems Sister     Prostate cancer Maternal Grandfather     Breast cancer Neg Hx     Ovarian cancer Neg Hx     Uterine cancer Neg Hx     Colon cancer Neg Hx     Melanoma Neg Hx        The following portions of the patient's history were reviewed and updated as appropriate: allergies, current medications, past family history, past medical history, past social history, past surgical history, and problem list.    Review of Systems   Constitutional:  Positive for unexpected weight gain. Negative for activity change, appetite change, chills, diaphoresis, fatigue, fever and unexpected weight loss.   HENT:  Negative for congestion, dental problem,  drooling, ear discharge, ear pain, facial swelling, hearing loss, mouth sores, nosebleeds, postnasal drip, rhinorrhea, sinus pressure, sneezing, sore throat, swollen glands, tinnitus, trouble swallowing and voice change.    Eyes:  Negative for blurred vision, double vision, photophobia, pain, discharge, redness, itching and visual disturbance.   Respiratory:  Negative for apnea, cough, choking, chest tightness, shortness of breath, wheezing and stridor.    Cardiovascular:  Negative for chest pain, palpitations and leg swelling.   Gastrointestinal:  Negative for abdominal distention, abdominal pain, anal bleeding, anorexia, blood in stool, constipation, diarrhea, nausea, rectal pain, vomiting, GERD and indigestion.   Endocrine: Negative for cold intolerance, heat intolerance, polydipsia, polyphagia and polyuria.   Genitourinary:  Negative for amenorrhea, breast discharge, breast lump, breast pain, decreased libido, decreased urine volume, difficulty urinating, dyspareunia, dysuria, flank pain, frequency, genital sores, hematuria, menstrual problem, pelvic pain, pelvic pressure, urgency, urinary incontinence, vaginal bleeding, vaginal discharge and vaginal pain.   Musculoskeletal:  Negative for arthralgias, back pain, gait problem, joint pain, joint swelling, myalgias, neck pain, neck stiffness and bursitis.   Skin:  Negative for color change, dry skin and rash.   Allergic/Immunologic: Negative for environmental allergies, food allergies and immunocompromised state.   Neurological:  Negative for dizziness, tremors, seizures, syncope, facial asymmetry, speech difficulty, weakness, light-headedness, numbness, headache, memory problem and confusion.   Hematological:  Negative for adenopathy. Does not bruise/bleed easily.   Psychiatric/Behavioral:  Negative for agitation, behavioral problems, decreased concentration, dysphoric mood, hallucinations, self-injury, sleep disturbance, suicidal ideas, negative for  hyperactivity, depressed mood and stress. The patient is not nervous/anxious.        Objective   Physical Exam  Vitals and nursing note reviewed. Exam conducted with a chaperone present.   Constitutional:       General: She is not in acute distress.     Appearance: She is well-developed. She is not diaphoretic.   HENT:      Head: Normocephalic.      Right Ear: External ear normal.      Left Ear: External ear normal.      Nose: Nose normal.   Eyes:      General: No scleral icterus.        Right eye: No discharge.         Left eye: No discharge.      Conjunctiva/sclera: Conjunctivae normal.      Pupils: Pupils are equal, round, and reactive to light.   Neck:      Thyroid: No thyromegaly.      Vascular: No carotid bruit.      Trachea: No tracheal deviation.   Cardiovascular:      Rate and Rhythm: Normal rate and regular rhythm.      Heart sounds: Normal heart sounds. No murmur heard.  Pulmonary:      Effort: Pulmonary effort is normal. No respiratory distress.      Breath sounds: Normal breath sounds. No wheezing.   Chest:   Breasts:     Breasts are symmetrical.      Right: Normal. No swelling, bleeding, inverted nipple, mass, nipple discharge, skin change or tenderness.      Left: Normal. No swelling, bleeding, inverted nipple, mass, nipple discharge, skin change or tenderness.   Abdominal:      General: There is no distension.      Palpations: Abdomen is soft. There is no mass.      Tenderness: There is no abdominal tenderness. There is no right CVA tenderness, left CVA tenderness or guarding.      Hernia: No hernia is present. There is no hernia in the left inguinal area or right inguinal area.   Genitourinary:     General: Normal vulva.      Exam position: Lithotomy position.      Labia:         Right: No rash, tenderness, lesion or injury.         Left: No rash, tenderness, lesion or injury.       Vagina: Normal. No signs of injury and foreign body. No vaginal discharge, erythema, tenderness or bleeding.       Cervix: Normal.      Uterus: Normal. Not enlarged, not fixed and not tender.       Adnexa: Right adnexa normal and left adnexa normal.        Right: No mass, tenderness or fullness.          Left: No mass, tenderness or fullness.        Rectum: Normal. No mass.      Comments:   BSU normal  Urethral meatus  Normal  Perineum  Normal  Musculoskeletal:         General: No tenderness. Normal range of motion.      Cervical back: Normal range of motion and neck supple.   Lymphadenopathy:      Head:      Right side of head: No submental, submandibular, tonsillar, preauricular, posterior auricular or occipital adenopathy.      Left side of head: No submental, submandibular, tonsillar, preauricular, posterior auricular or occipital adenopathy.      Cervical: No cervical adenopathy.      Right cervical: No superficial, deep or posterior cervical adenopathy.     Left cervical: No superficial, deep or posterior cervical adenopathy.      Upper Body:      Right upper body: No supraclavicular, axillary or pectoral adenopathy.      Left upper body: No supraclavicular, axillary or pectoral adenopathy.      Lower Body: No right inguinal adenopathy. No left inguinal adenopathy.   Skin:     General: Skin is warm and dry.      Findings: No bruising, erythema or rash.   Neurological:      Mental Status: She is alert and oriented to person, place, and time.      Coordination: Coordination normal.   Psychiatric:         Mood and Affect: Mood normal.         Behavior: Behavior normal.         Thought Content: Thought content normal.         Judgment: Judgment normal.         PHQ-9 Depression Screening  Little interest or pleasure in doing things? 0-->not at all   Feeling down, depressed, or hopeless? 0-->not at all   Trouble falling or staying asleep, or sleeping too much?     Feeling tired or having little energy?     Poor appetite or overeating?     Feeling bad about yourself - or that you are a failure or have let yourself or your family  down?     Trouble concentrating on things, such as reading the newspaper or watching television?     Moving or speaking so slowly that other people could have noticed? Or the opposite - being so fidgety or restless that you have been moving around a lot more than usual?     Thoughts that you would be better off dead, or of hurting yourself in some way?     PHQ-9 Total Score 0   If you checked off any problems, how difficult have these problems made it for you to do your work, take care of things at home, or get along with other people?          Assessment & Plan   Diagnoses and all orders for this visit:    1. Well woman exam with routine gynecological exam (Primary)  Normal GYN exam. Will have lab work. Encouraged SBE, pt is aware how to do self breast exam and the importance of same. Discussed weight management and importance of maintaining a healthy weight. Discussed Vitamin D intake and the importance of adequate vitamin D for both Bone Health and a healthy immune system.  Discussed Daily exercise and the importance of same, in regards to a healthy heart as well as helping to maintain her weight and improving her mental health.  BMI 36.9.  Colonoscopy will be scheduled with gastro.  Mammogram will be scheduled at [facility].  Pap smear is done after  we discussed ASCCP guidelines.  After informed decision patient wishes to proceed with the Pap smear.        -     Liquid-based Pap Smear, Screening  -     Ambulatory Referral For Screening Colonoscopy  -     CBC & Differential  -     Comprehensive Metabolic Panel  -     Lipid Panel With LDL / HDL Ratio  -     TSH  -     T3, Uptake  -     T4, Free  -     Hemoglobin A1c  -     Urine Culture - , Urine, Clean Catch  -     UA / M With / Rflx Culture([facility] ONLY) - Urine, Clean Catch    2. Encounter for screening mammogram for breast cancer  Comments:  Patient will have a mammogram at [facility].  Orders:  -     Mammo Screening Digital  Tomosynthesis Bilateral With CAD; Future    3. Encounter for screening for osteoporosis  Comments:  Will have a bone density at [facility].  Orders:  -     DEXA Bone Density Axial; Future    4. Smoker  Comments:  Patient will have her CT of her lungs.  Orders:  -      CT Chest Low Dose Cancer Screening WO; Future    5. Menopausal symptoms  Comments:  Patient is doing well with out hormones.  She will call with any bleeding or spotting.                Luzmaria Calhoun, APRN  3/5/2024

## 2024-03-07 LAB
GEN CATEG CVX/VAG CYTO-IMP: NORMAL
HPV I/H RISK 4 DNA CVX QL PROBE+SIG AMP: NOT DETECTED
LAB AP CASE REPORT: NORMAL
LAB AP GYN ADDITIONAL INFORMATION: NORMAL
LAB AP GYN OTHER FINDINGS: NORMAL
Lab: NORMAL
PATH INTERP SPEC-IMP: NORMAL
STAT OF ADQ CVX/VAG CYTO-IMP: NORMAL

## 2024-03-10 LAB
NCCN CRITERIA FLAG: NORMAL
TYRER CUZICK SCORE: 13.6

## 2024-03-11 ENCOUNTER — HOSPITAL ENCOUNTER (OUTPATIENT)
Dept: CT IMAGING | Facility: HOSPITAL | Age: 59
Discharge: HOME | End: 2024-03-11
Payer: COMMERCIAL

## 2024-03-11 ENCOUNTER — HOSPITAL ENCOUNTER (OUTPATIENT)
Dept: BONE DENSITY | Facility: HOSPITAL | Age: 59
Discharge: HOME | End: 2024-03-11
Payer: COMMERCIAL

## 2024-03-11 ENCOUNTER — HOSPITAL ENCOUNTER (OUTPATIENT)
Dept: MAMMOGRAPHY | Facility: HOSPITAL | Age: 59
Discharge: HOME | End: 2024-03-11
Payer: COMMERCIAL

## 2024-03-11 DIAGNOSIS — Z13.820 ENCOUNTER FOR SCREENING FOR OSTEOPOROSIS: ICD-10-CM

## 2024-03-11 DIAGNOSIS — F17.200 SMOKER: ICD-10-CM

## 2024-03-11 DIAGNOSIS — Z12.31 ENCOUNTER FOR SCREENING MAMMOGRAM FOR BREAST CANCER: ICD-10-CM

## 2024-03-11 PROCEDURE — 77063 BREAST TOMOSYNTHESIS BI: CPT

## 2024-03-11 PROCEDURE — 71271 CT THORAX LUNG CANCER SCR C-: CPT

## 2024-03-11 PROCEDURE — 77080 DXA BONE DENSITY AXIAL: CPT

## 2024-03-11 PROCEDURE — 77067 SCR MAMMO BI INCL CAD: CPT

## 2024-04-19 ENCOUNTER — CLINICAL SUPPORT (OUTPATIENT)
Dept: GASTROENTEROLOGY | Facility: CLINIC | Age: 59
End: 2024-04-19
Payer: COMMERCIAL

## 2024-04-19 VITALS — WEIGHT: 227 LBS | HEIGHT: 66 IN | BODY MASS INDEX: 36.48 KG/M2

## 2024-04-19 DIAGNOSIS — Z12.11 ENCOUNTER FOR SCREENING FOR MALIGNANT NEOPLASM OF COLON: Primary | ICD-10-CM

## 2024-04-19 RX ORDER — SODIUM, POTASSIUM,MAG SULFATES 17.5-3.13G
SOLUTION, RECONSTITUTED, ORAL ORAL
Qty: 354 ML | Refills: 0 | Status: SHIPPED | OUTPATIENT
Start: 2024-04-19

## 2024-04-19 NOTE — PROGRESS NOTES
Miranda Elliott  1965      4/19/2024  Chief Complaint   Patient presents with    Colonoscopy     Subjective   HPI  Miranda Elliott is a 58 y.o. female who presents as a referral for preventative maintenance. She has no complaints of nausea or vomiting. No change in bowels. No wt loss. No BRBPR. No melena. There is no family hx for colon cancer. No abdominal pain.    Past Medical History:   Diagnosis Date    Abnormal Pap smear of cervix     Anxiety     Cervical dysplasia     Cholelithiasis May 2022    Gall bladder removed    Irritable bowel syndrome 1990    Medial meniscus tear     Ovarian cyst     Dont remember the date    PMS (premenstrual syndrome)     Polycystic ovary syndrome     Possibly?    Recurrent pregnancy loss, antepartum condition or complication Dec 1998    Seasonal allergies     Urinary tract infection 1990    Varicella 1979     Past Surgical History:   Procedure Laterality Date    ABDOMINAL SURGERY  2011    Right ovary and tube removed    BACK SURGERY  1998    Lumbar laminectomy L5-S1    BACK SURGERY  2012    lumbar fusion L5-S1    CHOLECYSTECTOMY WITH INTRAOPERATIVE CHOLANGIOGRAM N/A 05/16/2022    Procedure: LAPAROSCOPIC CHOLECYSTECTOMY;  Surgeon: Laura Rod MD;  Location:  [facility] OR;  Service: General;  Laterality: N/A;    COLONOSCOPY  12/27/2011    normal exam    D & C HYSTEROSCOPY  1998    DILATATION AND CURETTAGE      ENDOMETRIAL ABLATION  2010    in office    HAND TENDON SURGERY Left 1982    TENDON GRAFT    KNEE ARTHROSCOPY Left 07/22/2021    Procedure: LEFT KNEE ARTHROSCOPIC PARTIAL MEDIAL MENISECTOMY;  Surgeon: Kevin Tovar MD;  Location: [facility] OR;  Service: Orthopedics;  Laterality: Left;    LAPAROSCOPIC CHOLECYSTECTOMY  May 2022    OVARIAN CYST SURGERY  2010    SALPINGO OOPHORECTOMY Right 2011    TONSILLECTOMY  1970    and adenoids    WISDOM TOOTH EXTRACTION  1980     Outpatient Medications Marked as Taking for the 4/19/24 encounter (Clinical Support) with Merissa Nolasco  NELLI Greenberg   Medication Sig Dispense Refill    nystatin (MYCOSTATIN) 345810 UNIT/GM cream Apply 1 application  topically to the appropriate area as directed 3 (Three) Times a Day. 30 g 3    triamcinolone (KENALOG) 0.5 % cream Apply 1 application  topically to the appropriate area as directed 2 (Two) Times a Day. 45 g 3     Allergies   Allergen Reactions    Lortab [Hydrocodone-Acetaminophen] Nausea Only     Pt states couldn't empty bladder and was very nauseated---PT CAN TOLERATE PERCOCET     Social History     Socioeconomic History    Marital status:    Tobacco Use    Smoking status: Former     Current packs/day: 0.00     Average packs/day: 1 pack/day for 28.4 years (28.4 ttl pk-yrs)     Types: Cigarettes     Start date: 1994     Quit date: 2022     Years since quittin.8    Smokeless tobacco: Never   Vaping Use    Vaping status: Former   Substance and Sexual Activity    Alcohol use: Yes     Comment: Drink on a rare occassion    Drug use: Never    Sexual activity: Yes     Partners: Male     Birth control/protection: Post-menopausal     Family History   Problem Relation Age of Onset    Diabetes Father         Type 2    Kidney cancer Mother     Irritable bowel syndrome Mother     No Known Problems Sister     Prostate cancer Maternal Grandfather     Breast cancer Neg Hx     Ovarian cancer Neg Hx     Uterine cancer Neg Hx     Colon cancer Neg Hx     Melanoma Neg Hx      Health Maintenance   Topic Date Due    TDAP/TD VACCINES (1 - Tdap) Never done    ZOSTER VACCINE (1 of 2) Never done    ANNUAL PHYSICAL  Never done    COVID-19 Vaccine (2 - - season) 2023    INFLUENZA VACCINE  2024    BMI FOLLOWUP  2024    LUNG CANCER SCREENING  2025    MAMMOGRAM  2026    PAP SMEAR  2027    COLORECTAL CANCER SCREENING  2030    HEPATITIS C SCREENING  Completed    Pneumococcal Vaccine 0-64  Aged Out       REVIEW OF SYSTEMS  General: well appearing, no fever chills or sweats,  "no unexplained wt loss  HEENT: no acute visual or hearing disturbances  Cardiovascular: No chest pain or palpitations  Pulmonary: No shortness of breath, coughing, wheezing or hemoptysis  : No burning, urgency, hematuria, or dysuria  Musculoskeletal: No joint pain or stiffness  Peripheral: no edema  Skin: No lesions or rashes  Neuro: No dizziness, headaches, stroke, syncope  Endocrine: No hot or cold intolerances  Hematological: No blood dyscrasias    Objective   Vitals:    04/19/24 0859   Weight: 103 kg (227 lb)   Height: 167 cm (65.75\")     Body mass index is 36.92 kg/m².         PHYSICAL EXAM      Assessment & Plan     Diagnoses and all orders for this visit:    1. Encounter for screening for malignant neoplasm of colon (Primary)  -     Case Request; Standing  -     Implement Anesthesia Orders Day of Procedure; Standing  -     Verify bowel prep was successful; Standing  -     Obtain Informed Consent; Standing  -     Case Request  -     sodium-potassium-magnesium sulfates (Suprep Bowel Prep Kit) 17.5-3.13-1.6 GM/177ML solution oral solution; Take as directed by office instructions provided  Dispense: 177 mL; Refill: 0        COLONOSCOPY WITH ANESTHESIA (N/A)  Body mass index is 36.92 kg/m².    Patient instructions on prep prior to procedure provided to the patient.    All risks, benefits, alternatives, and indications of colonoscopy procedure have been discussed with the patient. Risks to include perforation of the colon requiring possible surgery or colostomy, risk of bleeding from biopsies or removal of colon tissue, possibility of missing a colon polyp or cancer, or adverse drug reaction.  Benefits to include the diagnosis and management of disease of the colon and rectum. Alternatives to include barium enema, radiographic evaluation, lab testing or no intervention. Pt verbalizes understanding and agrees.     Merissa Nolasco, APRN  4/19/2024  09:12 CDT      IF YOU SMOKE OR USE TOBACCO PLEASE READ THE " FOLLOWIN minutes reading provided    Why is smoking bad for me?  Smoking increases the risk of heart disease, lung disease, vascular disease, stroke, and cancer.     If you smoke, STOP!    If you would like more information on quitting smoking, please visit the [employer] website: [email or web address]   This link will provide additional resources including the QUIT line and the Beat the Pack support groups.     For more information:    Quit Now [place]  -QUIT-NOW  [email or web address]

## 2024-04-29 ENCOUNTER — TELEMEDICINE (OUTPATIENT)
Dept: FAMILY MEDICINE CLINIC | Facility: TELEHEALTH | Age: 59
End: 2024-04-29
Payer: COMMERCIAL

## 2024-04-29 DIAGNOSIS — J01.00 ACUTE MAXILLARY SINUSITIS, RECURRENCE NOT SPECIFIED: Primary | ICD-10-CM

## 2024-04-29 DIAGNOSIS — J40 BRONCHITIS: ICD-10-CM

## 2024-04-29 PROCEDURE — 99213 OFFICE O/P EST LOW 20 MIN: CPT | Performed by: NURSE PRACTITIONER

## 2024-04-29 RX ORDER — BENZONATATE 200 MG/1
200 CAPSULE ORAL 3 TIMES DAILY PRN
Qty: 21 CAPSULE | Refills: 0 | Status: SHIPPED | OUTPATIENT
Start: 2024-04-29

## 2024-04-29 RX ORDER — PREDNISONE 10 MG/1
TABLET ORAL
Qty: 21 TABLET | Refills: 0 | Status: SHIPPED | OUTPATIENT
Start: 2024-04-29

## 2024-04-29 RX ORDER — DEXTROMETHORPHAN HYDROBROMIDE AND PROMETHAZINE HYDROCHLORIDE 15; 6.25 MG/5ML; MG/5ML
5 SYRUP ORAL 4 TIMES DAILY PRN
Qty: 150 ML | Refills: 0 | Status: SHIPPED | OUTPATIENT
Start: 2024-04-29

## 2024-04-29 RX ORDER — AMOXICILLIN AND CLAVULANATE POTASSIUM 875; 125 MG/1; MG/1
1 TABLET, FILM COATED ORAL 2 TIMES DAILY
Qty: 20 TABLET | Refills: 0 | Status: SHIPPED | OUTPATIENT
Start: 2024-04-29 | End: 2024-05-09

## 2024-04-29 NOTE — PATIENT INSTRUCTIONS
Sinus Infection, Adult  A sinus infection, also called sinusitis, is inflammation of your sinuses. Sinuses are hollow spaces in the bones around your face. Your sinuses are located:  Around your eyes.  In the middle of your forehead.  Behind your nose.  In your cheekbones.  Mucus normally drains out of your sinuses. When your nasal tissues become inflamed or swollen, mucus can become trapped or blocked. This allows bacteria, viruses, and fungi to grow, which leads to infection. Most infections of the sinuses are caused by a virus.  A sinus infection can develop quickly. It can last for up to 4 weeks (acute) or for more than 12 weeks (chronic). A sinus infection often develops after a cold.  What are the causes?  This condition is caused by anything that creates swelling in the sinuses or stops mucus from draining. This includes:  Allergies.  Asthma.  Infection from bacteria or viruses.  Deformities or blockages in your nose or sinuses.  Abnormal growths in the nose (nasal polyps).  Pollutants, such as chemicals or irritants in the air.  Infection from fungi. This is rare.  What increases the risk?  You are more likely to develop this condition if you:  Have a weak body defense system (immune system).  Do a lot of swimming or diving.  Overuse nasal sprays.  Smoke.  What are the signs or symptoms?  The main symptoms of this condition are pain and a feeling of pressure around the affected sinuses. Other symptoms include:  Stuffy nose or congestion that makes it difficult to breathe through your nose.  Thick yellow or greenish drainage from your nose.  Tenderness, swelling, and warmth over the affected sinuses.  A cough that may get worse at night.  Decreased sense of smell and taste.  Extra mucus that collects in the throat or the back of the nose (postnasal drip) causing a sore throat or bad breath.  Tiredness (fatigue).  Fever.  How is this diagnosed?  This condition is diagnosed based on:  Your symptoms.  Your  medical history.  A physical exam.  Tests to find out if your condition is acute or chronic. This may include:  Checking your nose for nasal polyps.  Viewing your sinuses using a device that has a light (endoscope).  Testing for allergies or bacteria.  Imaging tests, such as an MRI or CT scan.  In rare cases, a bone biopsy may be done to rule out more serious types of fungal sinus disease.  How is this treated?  Treatment for a sinus infection depends on the cause and whether your condition is chronic or acute.  If caused by a virus, your symptoms should go away on their own within 10 days. You may be given medicines to relieve symptoms. They include:  Medicines that shrink swollen nasal passages (decongestants).  A spray that eases inflammation of the nostrils (topical intranasal corticosteroids).  Rinses that help get rid of thick mucus in your nose (nasal saline washes).  Medicines that treat allergies (antihistamines).  Over-the-counter pain relievers.  If caused by bacteria, your health care provider may recommend waiting to see if your symptoms improve. Most bacterial infections will get better without antibiotic medicine. You may be given antibiotics if you have:  A severe infection.  A weak immune system.  If caused by narrow nasal passages or nasal polyps, surgery may be needed.  Follow these instructions at home:  Medicines  Take, use, or apply over-the-counter and prescription medicines only as told by your health care provider. These may include nasal sprays.  If you were prescribed an antibiotic medicine, take it as told by your health care provider. Do not stop taking the antibiotic even if you start to feel better.  Hydrate and humidify    Drink enough fluid to keep your urine pale yellow. Staying hydrated will help to thin your mucus.  Use a cool mist humidifier to keep the humidity level in your home above 50%.  Inhale steam for 10-15 minutes, 3-4 times a day, or as told by your health care  provider. You can do this in the bathroom while a hot shower is running.  Limit your exposure to cool or dry air.  Rest  Rest as much as possible.  Sleep with your head raised (elevated).  Make sure you get enough sleep each night.  General instructions    Apply a warm, moist washcloth to your face 3-4 times a day or as told by your health care provider. This will help with discomfort.  Use nasal saline washes as often as told by your health care provider.  Wash your hands often with soap and water to reduce your exposure to germs. If soap and water are not available, use hand .  Do not smoke. Avoid being around people who are smoking (secondhand smoke).  Keep all follow-up visits. This is important.  Contact a health care provider if:  You have a fever.  Your symptoms get worse.  Your symptoms do not improve within 10 days.  Get help right away if:  You have a severe headache.  You have persistent vomiting.  You have severe pain or swelling around your face or eyes.  You have vision problems.  You develop confusion.  Your neck is stiff.  You have trouble breathing.  These symptoms may be an emergency. Get help right away. Call 911.  Do not wait to see if the symptoms will go away.  Do not drive yourself to the hospital.  Summary  A sinus infection is soreness and inflammation of your sinuses. Sinuses are hollow spaces in the bones around your face.  This condition is caused by nasal tissues that become inflamed or swollen. The swelling traps or blocks the flow of mucus. This allows bacteria, viruses, and fungi to grow, which leads to infection.  If you were prescribed an antibiotic medicine, take it as told by your health care provider. Do not stop taking the antibiotic even if you start to feel better.  Keep all follow-up visits. This is important.  This information is not intended to replace advice given to you by your health care provider. Make sure you discuss any questions you have with your health  care provider.  Document Revised: 11/22/2022 Document Reviewed: 11/22/2022  [employer] Patient Education © 2024 [employer]  Acute Bronchitis, Adult    Acute bronchitis is sudden inflammation of the main airways (bronchi) that come off the windpipe (trachea) in the lungs. The swelling causes the airways to get smaller and make more mucus than normal. This can make it hard to breathe and can cause coughing or noisy breathing (wheezing).  Acute bronchitis may last several weeks. The cough may last longer. Allergies, asthma, and exposure to smoke may make the condition worse.  What are the causes?  This condition can be caused by germs and by substances that irritate the lungs, including:  Cold and flu viruses. The most common cause of this condition is the virus that causes the common cold.  Bacteria. This is less common.  Breathing in substances that irritate the lungs, including:  Smoke from cigarettes and other forms of tobacco.  Dust and pollen.  Fumes from household cleaning products, gases, or burned fuel.  Indoor or outdoor air pollution.  What increases the risk?  The following factors may make you more likely to develop this condition:  A weak body's defense system, also called the immune system.  A condition that affects your lungs and breathing, such as asthma.  What are the signs or symptoms?  Common symptoms of this condition include:  Coughing. This may bring up clear, yellow, or green mucus from your lungs (sputum).  Wheezing.  Runny or stuffy nose.  Having too much mucus in your lungs (chest congestion).  Shortness of breath.  Aches and pains, including sore throat or chest.  How is this diagnosed?  This condition is usually diagnosed based on:  Your symptoms and medical history.  A physical exam.  You may also have other tests, including tests to rule out other conditions, such as pneumonia. These tests include:  A test of lung function.  Test of a mucus sample to look for the presence of  bacteria.  Tests to check the oxygen level in your blood.  Blood tests.  Chest X-ray.  How is this treated?  Most cases of acute bronchitis clear up over time without treatment. Your health care provider may recommend:  Drinking more fluids to help thin your mucus so it is easier to cough up.  Taking inhaled medicine (inhaler) to improve air flow in and out of your lungs.  Using a vaporizer or a humidifier. These are machines that add water to the air to help you breathe better.  Taking a medicine that thins mucus and clears congestion (expectorant).  Taking a medicine that prevents or stops coughing (cough suppressant).  It is not common to take an antibiotic medicine for this condition.  Follow these instructions at home:    Take over-the-counter and prescription medicines only as told by your health care provider.  Use an inhaler, vaporizer, or humidifier as told by your health care provider.  Take two teaspoons (10 mL) of honey at bedtime to lessen coughing at night.  Drink enough fluid to keep your urine pale yellow.  Do not use any products that contain nicotine or tobacco. These products include cigarettes, chewing tobacco, and vaping devices, such as e-cigarettes. If you need help quitting, ask your health care provider.  Get plenty of rest.  Return to your normal activities as told by your health care provider. Ask your health care provider what activities are safe for you.  Keep all follow-up visits. This is important.  How is this prevented?  To lower your risk of getting this condition again:  Wash your hands often with soap and water for at least 20 seconds. If soap and water are not available, use hand .  Avoid contact with people who have cold symptoms.  Try not to touch your mouth, nose, or eyes with your hands.  Avoid breathing in smoke or chemical fumes. Breathing smoke or chemical fumes will make your condition worse.  Get the flu shot every year.  Contact a health care provider if:  Your  symptoms do not improve after 2 weeks.  You have trouble coughing up the mucus.  Your cough keeps you awake at night.  You have a fever.  Get help right away if you:  Cough up blood.  Feel pain in your chest.  Have severe shortness of breath.  Faint or keep feeling like you are going to faint.  Have a severe headache.  Have a fever or chills that get worse.  These symptoms may represent a serious problem that is an emergency. Do not wait to see if the symptoms will go away. Get medical help right away. Call your local emergency services (911 in the [place] Do not drive yourself to the hospital.  Summary  Acute bronchitis is inflammation of the main airways (bronchi) that come off the windpipe (trachea) in the lungs. The swelling causes the airways to get smaller and make more mucus than normal.  Drinking more fluids can help thin your mucus so it is easier to cough up.  Take over-the-counter and prescription medicines only as told by your health care provider.  Do not use any products that contain nicotine or tobacco. These products include cigarettes, chewing tobacco, and vaping devices, such as e-cigarettes. If you need help quitting, ask your health care provider.  Contact a health care provider if your symptoms do not improve after 2 weeks.  This information is not intended to replace advice given to you by your health care provider. Make sure you discuss any questions you have with your health care provider.  Document Revised: 03/30/2023 Document Reviewed: 04/20/2022  Elsevier Patient Education © 2024 [employer]

## 2024-04-29 NOTE — LETTER
April 29, 2024     Patient: Miranda Elliott   YOB: 1965   Date of Visit: 4/29/2024       To Whom It May Concern:    It is my medical opinion that Miranda Elliott may return to work on Tuesday, April 30, 2024.            Sincerely,        NELLI Matthews    CC: No Recipients

## 2024-04-29 NOTE — PROGRESS NOTES
You have chosen to receive care through a telehealth visit.  Do you consent to use a video/audio connection for your medical care today? Yes     CHIEF COMPLAINT  No chief complaint on file.        HPI  Miranda Elliott is a 58 y.o. female  presents with complaint of 1 week history of productive cough with yellow/green discharge, nasal congestion with yellow, blood tinged discharge, side/chest hurts from coughing so much, fever 101 today, facial pressure/pain. Denies wheezing, shortness of breath, body aches.     Mucinex, tylenol, robitussin without any relief    Review of Systems  See HPI    Past Medical History:   Diagnosis Date    Abnormal Pap smear of cervix     Anxiety     Cervical dysplasia     Cholelithiasis May 2022    Gall bladder removed    Irritable bowel syndrome     Medial meniscus tear     Ovarian cyst     Dont remember the date    PMS (premenstrual syndrome)     Polycystic ovary syndrome     Possibly?    Recurrent pregnancy loss, antepartum condition or complication Dec 1998    Seasonal allergies     Urinary tract infection     Varicella 1979       Family History   Problem Relation Age of Onset    Diabetes Father         Type 2    Kidney cancer Mother     Irritable bowel syndrome Mother     No Known Problems Sister     Prostate cancer Maternal Grandfather     Breast cancer Neg Hx     Ovarian cancer Neg Hx     Uterine cancer Neg Hx     Colon cancer Neg Hx     Melanoma Neg Hx        Social History     Socioeconomic History    Marital status:    Tobacco Use    Smoking status: Former     Current packs/day: 0.00     Average packs/day: 1 pack/day for 28.4 years (28.4 ttl pk-yrs)     Types: Cigarettes     Start date: 1994     Quit date: 2022     Years since quittin.8    Smokeless tobacco: Never   Vaping Use    Vaping status: Former   Substance and Sexual Activity    Alcohol use: Yes     Comment: Drink on a rare occassion    Drug use: Never    Sexual activity: Yes     Partners:  Male     Birth control/protection: Post-menopausal       Miranda Elliott  reports that she quit smoking about 22 months ago. Her smoking use included cigarettes. She started smoking about 30 years ago. She has a 28.4 pack-year smoking history. She has never used smokeless tobacco.               LMP  (LMP Unknown)     PHYSICAL EXAM  Physical Exam   Constitutional: She is oriented to person, place, and time. She appears well-developed and well-nourished. She does not have a sickly appearance. She does not appear ill.   HENT:   Head: Normocephalic and atraumatic.   Nose: Right sinus exhibits maxillary sinus tenderness. Left sinus exhibits maxillary sinus tenderness.   Pulmonary/Chest: Effort normal.  No respiratory distress (persistent cough during visit).  Neurological: She is alert and oriented to person, place, and time.       Results for orders placed or performed in visit on 03/10/24   [facility] GENETIC ASSESSMENT - ,   Result Value Ref Range    TyrerCuzick 13.6     NCCN NCCN not met        Diagnoses and all orders for this visit:    1. Acute maxillary sinusitis, recurrence not specified (Primary)  -     amoxicillin-clavulanate (AUGMENTIN) 875-125 MG per tablet; Take 1 tablet by mouth 2 (Two) Times a Day for 10 days.  Dispense: 20 tablet; Refill: 0    2. Bronchitis  -     predniSONE (DELTASONE) 10 MG (21) dose pack; Use as directed on package  Dispense: 21 tablet; Refill: 0  -     benzonatate (TESSALON) 200 MG capsule; Take 1 capsule by mouth 3 (Three) Times a Day As Needed for Cough.  Dispense: 21 capsule; Refill: 0  -     promethazine-dextromethorphan (PROMETHAZINE-DM) 6.25-15 MG/5ML syrup; Take 5 mL by mouth 4 (Four) Times a Day As Needed for Cough.  Dispense: 150 mL; Refill: 0    --take medications as prescribed  --increase fluids, rest as needed, tylenol or ibuprofen for pain  --f/u in 5-7 days if no improvement        FOLLOW-UP  As discussed during visit with PCP/[facility] if no improvement or Urgent  Care/Emergency Department if worsening of symptoms    Patient verbalizes understanding of medication dosage, comfort measures, instructions for treatment and follow-up.    Karolina Olson, NELLI  04/29/2024  09:33 EDT    The use of a video visit has been reviewed with the patient and verbal informed consent has been obtained. Myself and Miranda Elliott participated in this visit. The patient is located in [address] [place] [address].    I am located in [place], [place]. Mychart and Twilio were utilized. I spent 8 minutes in the patient's chart for this visit.      Note Disclaimer: At [facility], we believe that sharing information builds trust and better   relationships. You are receiving this note because you recently visited [facility]. It is possible you   will see health information before a provider has talked with you about it. This kind of information can   be easy to misunderstand. To help you fully understand what it means for your health, we urge you to   discuss this note with your provider.

## 2024-05-01 ENCOUNTER — TELEMEDICINE (OUTPATIENT)
Dept: FAMILY MEDICINE CLINIC | Facility: TELEHEALTH | Age: 59
End: 2024-05-01
Payer: COMMERCIAL

## 2024-05-01 DIAGNOSIS — J22 LOWER RESPIRATORY INFECTION (E.G., BRONCHITIS, PNEUMONIA, PNEUMONITIS, PULMONITIS): Primary | ICD-10-CM

## 2024-05-01 PROCEDURE — 99213 OFFICE O/P EST LOW 20 MIN: CPT | Performed by: NURSE PRACTITIONER

## 2024-05-01 RX ORDER — ONDANSETRON 8 MG/1
8 TABLET, ORALLY DISINTEGRATING ORAL EVERY 8 HOURS PRN
Qty: 15 TABLET | Refills: 0 | Status: SHIPPED | OUTPATIENT
Start: 2024-05-01

## 2024-05-01 RX ORDER — AZITHROMYCIN 250 MG/1
TABLET, FILM COATED ORAL
Qty: 6 TABLET | Refills: 0 | Status: SHIPPED | OUTPATIENT
Start: 2024-05-01

## 2024-05-01 NOTE — PATIENT INSTRUCTIONS
Acute Bronchitis, Adult    Acute bronchitis is sudden inflammation of the main airways (bronchi) that come off the windpipe (trachea) in the lungs. The swelling causes the airways to get smaller and make more mucus than normal. This can make it hard to breathe and can cause coughing or noisy breathing (wheezing).  Acute bronchitis may last several weeks. The cough may last longer. Allergies, asthma, and exposure to smoke may make the condition worse.  What are the causes?  This condition can be caused by germs and by substances that irritate the lungs, including:  Cold and flu viruses. The most common cause of this condition is the virus that causes the common cold.  Bacteria. This is less common.  Breathing in substances that irritate the lungs, including:  Smoke from cigarettes and other forms of tobacco.  Dust and pollen.  Fumes from household cleaning products, gases, or burned fuel.  Indoor or outdoor air pollution.  What increases the risk?  The following factors may make you more likely to develop this condition:  A weak body's defense system, also called the immune system.  A condition that affects your lungs and breathing, such as asthma.  What are the signs or symptoms?  Common symptoms of this condition include:  Coughing. This may bring up clear, yellow, or green mucus from your lungs (sputum).  Wheezing.  Runny or stuffy nose.  Having too much mucus in your lungs (chest congestion).  Shortness of breath.  Aches and pains, including sore throat or chest.  How is this diagnosed?  This condition is usually diagnosed based on:  Your symptoms and medical history.  A physical exam.  You may also have other tests, including tests to rule out other conditions, such as pneumonia. These tests include:  A test of lung function.  Test of a mucus sample to look for the presence of bacteria.  Tests to check the oxygen level in your blood.  Blood tests.  Chest X-ray.  How is this treated?  Most cases of acute  bronchitis clear up over time without treatment. Your health care provider may recommend:  Drinking more fluids to help thin your mucus so it is easier to cough up.  Taking inhaled medicine (inhaler) to improve air flow in and out of your lungs.  Using a vaporizer or a humidifier. These are machines that add water to the air to help you breathe better.  Taking a medicine that thins mucus and clears congestion (expectorant).  Taking a medicine that prevents or stops coughing (cough suppressant).  It is not common to take an antibiotic medicine for this condition.  Follow these instructions at home:    Take over-the-counter and prescription medicines only as told by your health care provider.  Use an inhaler, vaporizer, or humidifier as told by your health care provider.  Take two teaspoons (10 mL) of honey at bedtime to lessen coughing at night.  Drink enough fluid to keep your urine pale yellow.  Do not use any products that contain nicotine or tobacco. These products include cigarettes, chewing tobacco, and vaping devices, such as e-cigarettes. If you need help quitting, ask your health care provider.  Get plenty of rest.  Return to your normal activities as told by your health care provider. Ask your health care provider what activities are safe for you.  Keep all follow-up visits. This is important.  How is this prevented?  To lower your risk of getting this condition again:  Wash your hands often with soap and water for at least 20 seconds. If soap and water are not available, use hand .  Avoid contact with people who have cold symptoms.  Try not to touch your mouth, nose, or eyes with your hands.  Avoid breathing in smoke or chemical fumes. Breathing smoke or chemical fumes will make your condition worse.  Get the flu shot every year.  Contact a health care provider if:  Your symptoms do not improve after 2 weeks.  You have trouble coughing up the mucus.  Your cough keeps you awake at night.  You have  a fever.  Get help right away if you:  Cough up blood.  Feel pain in your chest.  Have severe shortness of breath.  Faint or keep feeling like you are going to faint.  Have a severe headache.  Have a fever or chills that get worse.  These symptoms may represent a serious problem that is an emergency. Do not wait to see if the symptoms will go away. Get medical help right away. Call your local emergency services (911 in the [place] Do not drive yourself to the hospital.  Summary  Acute bronchitis is inflammation of the main airways (bronchi) that come off the windpipe (trachea) in the lungs. The swelling causes the airways to get smaller and make more mucus than normal.  Drinking more fluids can help thin your mucus so it is easier to cough up.  Take over-the-counter and prescription medicines only as told by your health care provider.  Do not use any products that contain nicotine or tobacco. These products include cigarettes, chewing tobacco, and vaping devices, such as e-cigarettes. If you need help quitting, ask your health care provider.  Contact a health care provider if your symptoms do not improve after 2 weeks.  This information is not intended to replace advice given to you by your health care provider. Make sure you discuss any questions you have with your health care provider.  Document Revised: 03/30/2023 Document Reviewed: 04/20/2022  Elsevier Patient Education © 2024 [employer]     Mechanically Vented

## 2024-05-01 NOTE — PROGRESS NOTES
You have chosen to receive care through a telehealth visit.  Do you consent to use a video/audio connection for your medical care today? Yes     CHIEF COMPLAINT  No chief complaint on file.        HPI  Miranda Elliott is a 58 y.o. female  presents with complaint of augmentin is causing diarrhea and nausea.  Would like to change antibiotics.  Is feeling better.     Review of Systems  See HPI    Past Medical History:   Diagnosis Date    Abnormal Pap smear of cervix     Anxiety     Cervical dysplasia     Cholelithiasis May 2022    Gall bladder removed    Irritable bowel syndrome     Medial meniscus tear     Ovarian cyst     Dont remember the date    PMS (premenstrual syndrome)     Polycystic ovary syndrome     Possibly?    Recurrent pregnancy loss, antepartum condition or complication Dec 1998    Seasonal allergies     Urinary tract infection     Varicella        Family History   Problem Relation Age of Onset    Diabetes Father         Type 2    Kidney cancer Mother     Irritable bowel syndrome Mother     No Known Problems Sister     Prostate cancer Maternal Grandfather     Breast cancer Neg Hx     Ovarian cancer Neg Hx     Uterine cancer Neg Hx     Colon cancer Neg Hx     Melanoma Neg Hx        Social History     Socioeconomic History    Marital status:    Tobacco Use    Smoking status: Former     Current packs/day: 0.00     Average packs/day: 1 pack/day for 28.4 years (28.4 ttl pk-yrs)     Types: Cigarettes     Start date: 1994     Quit date: 2022     Years since quittin.8    Smokeless tobacco: Never   Vaping Use    Vaping status: Former   Substance and Sexual Activity    Alcohol use: Yes     Comment: Drink on a rare occassion    Drug use: Never    Sexual activity: Yes     Partners: Male     Birth control/protection: Post-menopausal       Miranda Elliott  reports that she quit smoking about 22 months ago. Her smoking use included cigarettes. She started smoking about 30 years ago.  She has a 28.4 pack-year smoking history. She has never used smokeless tobacco.              LMP  (LMP Unknown)     PHYSICAL EXAM  Physical Exam   Constitutional: She is oriented to person, place, and time. She appears well-developed and well-nourished. She does not have a sickly appearance. She does not appear ill.   HENT:   Head: Normocephalic and atraumatic.   Pulmonary/Chest: Effort normal.  No respiratory distress (occasional cough during visit).  Neurological: She is alert and oriented to person, place, and time.           Diagnoses and all orders for this visit:    1. Lower respiratory infection (e.g., bronchitis, pneumonia, pneumonitis, pulmonitis) (Primary)  -     azithromycin (Zithromax Z-Aayush) 250 MG tablet; Take 2 tablets by mouth on day 1, then 1 tablet daily on days 2-5  Dispense: 6 tablet; Refill: 0  -     ondansetron ODT (ZOFRAN-ODT) 8 MG disintegrating tablet; Place 1 tablet on the tongue Every 8 (Eight) Hours As Needed for Nausea or Vomiting.  Dispense: 15 tablet; Refill: 0    --take medications as prescribed  --increase fluids, rest as needed, tylenol or ibuprofen for pain  --f/u in 5-7 days if no improvement        FOLLOW-UP  As discussed during visit with PCP/[facility] if no improvement or Urgent Care/Emergency Department if worsening of symptoms    Patient verbalizes understanding of medication dosage, comfort measures, instructions for treatment and follow-up.    Karolina Olson, NELLI  05/01/2024  11:27 EDT    The use of a video visit has been reviewed with the patient and verbal informed consent has been obtained. Myself and Miranda Elliott participated in this visit. The patient is located in [address] [place] [address] [address] [place] [address].    I am located in [place], [place]. WISETIVIhart and phorusiliStarShooter were utilized. I spent 8 minutes in the patient's chart for this visit.      Note Disclaimer: At [facility], we believe that sharing information builds trust and better   relationships. You are receiving this  note because you recently visited [facility]. It is possible you   will see health information before a provider has talked with you about it. This kind of information can   be easy to misunderstand. To help you fully understand what it means for your health, we urge you to   discuss this note with your provider.

## 2024-05-06 ENCOUNTER — LAB (OUTPATIENT)
Dept: LAB | Facility: HOSPITAL | Age: 59
End: 2024-05-06
Payer: COMMERCIAL

## 2024-05-06 LAB
ALBUMIN SERPL-MCNC: 3.9 G/DL (ref 3.5–5.2)
ALBUMIN/GLOB SERPL: 1.4 G/DL
ALP SERPL-CCNC: 86 U/L (ref 39–117)
ALT SERPL W P-5'-P-CCNC: 18 U/L (ref 1–33)
ANION GAP SERPL CALCULATED.3IONS-SCNC: 7 MMOL/L (ref 5–15)
AST SERPL-CCNC: 12 U/L (ref 1–32)
BASOPHILS # BLD AUTO: 0.07 10*3/MM3 (ref 0–0.2)
BASOPHILS NFR BLD AUTO: 0.7 % (ref 0–1.5)
BILIRUB SERPL-MCNC: 0.3 MG/DL (ref 0–1.2)
BUN SERPL-MCNC: 13 MG/DL (ref 6–20)
BUN/CREAT SERPL: 16.3 (ref 7–25)
CALCIUM SPEC-SCNC: 9.1 MG/DL (ref 8.6–10.5)
CHLORIDE SERPL-SCNC: 102 MMOL/L (ref 98–107)
CHOLEST SERPL-MCNC: 186 MG/DL (ref 0–200)
CO2 SERPL-SCNC: 32 MMOL/L (ref 22–29)
CREAT SERPL-MCNC: 0.8 MG/DL (ref 0.57–1)
DEPRECATED RDW RBC AUTO: 40.6 FL (ref 37–54)
EGFRCR SERPLBLD CKD-EPI 2021: 85.5 ML/MIN/1.73
EOSINOPHIL # BLD AUTO: 0.31 10*3/MM3 (ref 0–0.4)
EOSINOPHIL NFR BLD AUTO: 3 % (ref 0.3–6.2)
ERYTHROCYTE [DISTWIDTH] IN BLOOD BY AUTOMATED COUNT: 12.9 % (ref 12.3–15.4)
GLOBULIN UR ELPH-MCNC: 2.8 GM/DL
GLUCOSE SERPL-MCNC: 93 MG/DL (ref 65–99)
HBA1C MFR BLD: 5.9 % (ref 4.8–5.6)
HCT VFR BLD AUTO: 45.3 % (ref 34–46.6)
HDLC SERPL-MCNC: 48 MG/DL (ref 40–60)
HGB BLD-MCNC: 14.9 G/DL (ref 12–15.9)
IMM GRANULOCYTES # BLD AUTO: 0.31 10*3/MM3 (ref 0–0.05)
IMM GRANULOCYTES NFR BLD AUTO: 3 % (ref 0–0.5)
LDLC SERPL CALC-MCNC: 108 MG/DL (ref 0–100)
LDLC/HDLC SERPL: 2.15 {RATIO}
LYMPHOCYTES # BLD AUTO: 2.56 10*3/MM3 (ref 0.7–3.1)
LYMPHOCYTES NFR BLD AUTO: 24.5 % (ref 19.6–45.3)
MCH RBC QN AUTO: 28.8 PG (ref 26.6–33)
MCHC RBC AUTO-ENTMCNC: 32.9 G/DL (ref 31.5–35.7)
MCV RBC AUTO: 87.5 FL (ref 79–97)
MONOCYTES # BLD AUTO: 0.6 10*3/MM3 (ref 0.1–0.9)
MONOCYTES NFR BLD AUTO: 5.8 % (ref 5–12)
NEUTROPHILS NFR BLD AUTO: 6.58 10*3/MM3 (ref 1.7–7)
NEUTROPHILS NFR BLD AUTO: 63 % (ref 42.7–76)
NRBC BLD AUTO-RTO: 0 /100 WBC (ref 0–0.2)
PLATELET # BLD AUTO: 356 10*3/MM3 (ref 140–450)
PMV BLD AUTO: 8.5 FL (ref 6–12)
POTASSIUM SERPL-SCNC: 4.4 MMOL/L (ref 3.5–5.2)
PROT SERPL-MCNC: 6.7 G/DL (ref 6–8.5)
RBC # BLD AUTO: 5.18 10*6/MM3 (ref 3.77–5.28)
SODIUM SERPL-SCNC: 141 MMOL/L (ref 136–145)
T4 FREE SERPL-MCNC: 1.57 NG/DL (ref 0.93–1.7)
TRIGL SERPL-MCNC: 173 MG/DL (ref 0–150)
TSH SERPL DL<=0.05 MIU/L-ACNC: 1.39 UIU/ML (ref 0.27–4.2)
VLDLC SERPL-MCNC: 30 MG/DL (ref 5–40)
WBC NRBC COR # BLD AUTO: 10.43 10*3/MM3 (ref 3.4–10.8)

## 2024-05-06 PROCEDURE — 84439 ASSAY OF FREE THYROXINE: CPT | Performed by: NURSE PRACTITIONER

## 2024-05-06 PROCEDURE — 80050 GENERAL HEALTH PANEL: CPT | Performed by: NURSE PRACTITIONER

## 2024-05-06 PROCEDURE — 83036 HEMOGLOBIN GLYCOSYLATED A1C: CPT | Performed by: NURSE PRACTITIONER

## 2024-05-06 PROCEDURE — 84479 ASSAY OF THYROID (T3 OR T4): CPT | Performed by: NURSE PRACTITIONER

## 2024-05-06 PROCEDURE — 80061 LIPID PANEL: CPT | Performed by: NURSE PRACTITIONER

## 2024-05-07 LAB — T3RU NFR SERPL: 31 % (ref 24–39)

## 2024-05-13 ENCOUNTER — TELEPHONE (OUTPATIENT)
Dept: GASTROENTEROLOGY | Facility: CLINIC | Age: 59
End: 2024-05-13
Payer: COMMERCIAL

## 2024-05-13 NOTE — TELEPHONE ENCOUNTER
Caller: Miranda Elliott    Relationship: Self    Best call back number: [phone]     What was the call regarding: PT IS CALLING TO SEE IF WE CAN MAIL PREP INSTRUCTIONS OUT TO HER FOR HER PROCEDURE ON 06/24/24 WITH JOSE LUIS.

## 2024-06-04 ENCOUNTER — OFFICE VISIT (OUTPATIENT)
Dept: GASTROENTEROLOGY | Facility: CLINIC | Age: 59
End: 2024-06-04
Payer: COMMERCIAL

## 2024-06-04 VITALS
BODY MASS INDEX: 37.57 KG/M2 | HEART RATE: 61 BPM | OXYGEN SATURATION: 97 % | TEMPERATURE: 97.3 F | HEIGHT: 66 IN | WEIGHT: 233.8 LBS | SYSTOLIC BLOOD PRESSURE: 124 MMHG | DIASTOLIC BLOOD PRESSURE: 80 MMHG

## 2024-06-04 DIAGNOSIS — R10.13 EPIGASTRIC PAIN: Primary | ICD-10-CM

## 2024-06-04 DIAGNOSIS — R13.19 ESOPHAGEAL DYSPHAGIA: ICD-10-CM

## 2024-06-04 DIAGNOSIS — Z78.9 NONSMOKER: ICD-10-CM

## 2024-06-04 PROCEDURE — 99214 OFFICE O/P EST MOD 30 MIN: CPT | Performed by: CLINICAL NURSE SPECIALIST

## 2024-06-04 RX ORDER — OMEPRAZOLE 20 MG/1
20 CAPSULE, DELAYED RELEASE ORAL DAILY
Qty: 30 CAPSULE | Refills: 11 | Status: SHIPPED | OUTPATIENT
Start: 2024-06-04

## 2024-06-04 NOTE — H&P (VIEW-ONLY)
Miranda Elliott  1965    6/4/2024  Chief Complaint   Patient presents with    GI Problem     Epigastric pain     Subjective   HPI  Miranda Elliott is a 58 y.o. female who presents with a complaint of epigastric pain that comes up to her throat and into her neck. Ongoing worsening the past month. It is a burning most of the time like regurgitation/reflux. If she eats anything she feels like she is bloated and full. Food is getting stuck esophageal region. With solid foods it takes time to go down. No nausea or vomiting. No wt loss. No fever chills or sweats  She is already set up for her repeat colonoscopy then symptoms began. She is not taking anything to treat her symptoms.     Past Medical History:   Diagnosis Date    Abnormal Pap smear of cervix     Anxiety     Cervical dysplasia     Cholelithiasis May 2022    Gall bladder removed    Irritable bowel syndrome 1990    Medial meniscus tear     Ovarian cyst     Dont remember the date    PMS (premenstrual syndrome)     Polycystic ovary syndrome     Possibly?    Recurrent pregnancy loss, antepartum condition or complication Dec 1998    Seasonal allergies     Urinary tract infection 1990    Varicella 1979     Past Surgical History:   Procedure Laterality Date    ABDOMINAL SURGERY  2011    Right ovary and tube removed    BACK SURGERY  1998    Lumbar laminectomy L5-S1    BACK SURGERY  2012    lumbar fusion L5-S1    CHOLECYSTECTOMY WITH INTRAOPERATIVE CHOLANGIOGRAM N/A 05/16/2022    Procedure: LAPAROSCOPIC CHOLECYSTECTOMY;  Surgeon: Laura Rod MD;  Location: [facility] OR;  Service: General;  Laterality: N/A;    COLONOSCOPY  12/27/2011    normal exam    D & C HYSTEROSCOPY  1998    DILATATION AND CURETTAGE      ENDOMETRIAL ABLATION  2010    in office    HAND TENDON SURGERY Left 1982    TENDON GRAFT    KNEE ARTHROSCOPY Left 07/22/2021    Procedure: LEFT KNEE ARTHROSCOPIC PARTIAL MEDIAL MENISECTOMY;  Surgeon: Kevin Tovar MD;  Location: [facility] OR;  Service:  Orthopedics;  Laterality: Left;    LAPAROSCOPIC CHOLECYSTECTOMY  May 2022    OVARIAN CYST SURGERY  2010    SALPINGO OOPHORECTOMY Right 2011    TONSILLECTOMY  1970    and adenoids    WISDOM TOOTH EXTRACTION         Outpatient Medications Marked as Taking for the 24 encounter (Office Visit) with Merissa Nolasco APRN   Medication Sig Dispense Refill    ALPRAZolam (XANAX) 1 MG tablet Take 1 tablet by mouth 3 (Three) Times a Day As Needed for Anxiety. 90 tablet 0    nystatin (MYCOSTATIN) 459921 UNIT/GM cream Apply 1 application  topically to the appropriate area as directed 3 (Three) Times a Day. 30 g 3    sodium-potassium-magnesium sulfates (Suprep Bowel Prep Kit) 17.5-3.13-1.6 GM/177ML solution oral solution Take as directed by office instructions provided 354 mL 0    triamcinolone (KENALOG) 0.5 % cream Apply 1 application  topically to the appropriate area as directed 2 (Two) Times a Day. 45 g 3     Allergies   Allergen Reactions    Lortab [Hydrocodone-Acetaminophen] Nausea Only     Pt states couldn't empty bladder and was very nauseated---PT CAN TOLERATE PERCOCET     Social History     Socioeconomic History    Marital status:    Tobacco Use    Smoking status: Former     Current packs/day: 0.00     Average packs/day: 1 pack/day for 28.4 years (28.4 ttl pk-yrs)     Types: Cigarettes     Start date: 1994     Quit date: 2022     Years since quittin.9    Smokeless tobacco: Never   Vaping Use    Vaping status: Former   Substance and Sexual Activity    Alcohol use: Yes     Comment: Drink on a rare occassion    Drug use: Never    Sexual activity: Yes     Partners: Male     Birth control/protection: Post-menopausal     Family History   Problem Relation Age of Onset    Kidney cancer Mother     Irritable bowel syndrome Mother     Diabetes Father         Type 2    No Known Problems Sister     Prostate cancer Maternal Grandfather     Breast cancer Neg Hx     Ovarian cancer Neg Hx     Uterine  cancer Neg Hx     Colon cancer Neg Hx     Melanoma Neg Hx     Colon polyps Neg Hx      Health Maintenance   Topic Date Due    TDAP/TD VACCINES (1 - Tdap) Never done    ZOSTER VACCINE (1 of 2) Never done    ANNUAL PHYSICAL  Never done    COVID-19 Vaccine (2 - 2023-24 season) 09/01/2023    INFLUENZA VACCINE  08/01/2024    BMI FOLLOWUP  11/21/2024    LUNG CANCER SCREENING  03/11/2025    MAMMOGRAM  03/11/2026    PAP SMEAR  03/05/2027    COLORECTAL CANCER SCREENING  02/13/2030    HEPATITIS C SCREENING  Completed    Pneumococcal Vaccine 0-64  Aged Out     Review of Systems   Constitutional:  Negative for activity change, appetite change, chills, diaphoresis, fatigue, fever and unexpected weight change.   HENT:  Negative for ear pain, hearing loss, mouth sores, sore throat, trouble swallowing and voice change.    Eyes: Negative.    Respiratory:  Negative for cough, choking, shortness of breath and wheezing.    Cardiovascular:  Negative for chest pain and palpitations.   Gastrointestinal:  Positive for abdominal pain (epigastric). Negative for blood in stool, constipation, diarrhea, nausea and vomiting.   Endocrine: Negative for cold intolerance and heat intolerance.   Genitourinary:  Negative for decreased urine volume, dysuria, frequency, hematuria and urgency.   Musculoskeletal:  Negative for back pain, gait problem and myalgias.   Skin:  Negative for color change, pallor and rash.   Allergic/Immunologic: Negative for food allergies and immunocompromised state.   Neurological:  Negative for dizziness, tremors, seizures, syncope, weakness, light-headedness, numbness and headaches.   Hematological:  Negative for adenopathy. Does not bruise/bleed easily.   Psychiatric/Behavioral:  Negative for agitation and confusion. The patient is not nervous/anxious.    All other systems reviewed and are negative.    Objective   Vitals:    06/04/24 0850   BP: 124/80   BP Location: Left arm   Pulse: 61   Temp: 97.3 °F (36.3 °C)   TempSrc:  "Temporal   SpO2: 97%   Weight: 106 kg (233 lb 12.8 oz)   Height: 167 cm (65.75\")     Body mass index is 38.03 kg/m².  Physical Exam  Constitutional:       Appearance: She is well-developed.   HENT:      Head: Normocephalic and atraumatic.   Eyes:      Pupils: Pupils are equal, round, and reactive to light.   Neck:      Trachea: No tracheal deviation.   Cardiovascular:      Rate and Rhythm: Normal rate and regular rhythm.      Heart sounds: Normal heart sounds. No murmur heard.     No friction rub. No gallop.   Pulmonary:      Effort: Pulmonary effort is normal. No respiratory distress.      Breath sounds: Normal breath sounds. No wheezing or rales.   Chest:      Chest wall: No tenderness.   Abdominal:      General: Bowel sounds are normal. There is no distension.      Palpations: Abdomen is soft. Abdomen is not rigid.      Tenderness: There is no abdominal tenderness. There is no guarding or rebound.   Musculoskeletal:         General: No tenderness or deformity. Normal range of motion.      Cervical back: Normal range of motion and neck supple.   Skin:     General: Skin is warm and dry.      Coloration: Skin is not pale.      Findings: No rash.   Neurological:      Mental Status: She is alert and oriented to person, place, and time.      Deep Tendon Reflexes: Reflexes are normal and symmetric.   Psychiatric:         Behavior: Behavior normal.         Thought Content: Thought content normal.         Judgment: Judgment normal.       Assessment & Plan   Diagnoses and all orders for this visit:    1. Epigastric pain (Primary)  -     Case Request; Standing  -     Case Request    2. Esophageal dysphagia    3. Nonsmoker    Other orders  -     Implement Anesthesia Orders Day of Procedure; Standing  -     Follow Anesthesia Guidelines / Protocol; Future  -     Obtain Informed Consent; Future  -     Verify Bowel Prep Was Successful; Standing  -     Obtain Informed Consent; Standing  -     omeprazole (priLOSEC) 20 MG capsule; " Take 1 capsule by mouth Daily.  Dispense: 30 capsule; Refill: 11    I discussed non pharmaceutical treatment of gerd.  This includes gradually losing weight to achieve ideal body wt., elevation of the head of bed by 4-6 inches, nothing to eat or drink 3 hours prior to lying down, avoiding tight clothing, stress reduction, tobacco cessation, reduction of alcohol intake, and dietary restrictions (avoiding caffeine, coffee, fatty foods, mints, chocolate, spicy foods and tomato based sauces as much as possible).  Will add Prilosec daily.   I will add endoscopy to her colonoscopy    ESOPHAGOGASTRODUODENOSCOPY WITH ANESTHESIA (N/A), COLONOSCOPY WITH ANESTHESIA (N/A)  Part of this note may be an electronic transcription/translation of spoken language to printed text using the Dragon Dictation System.  Body mass index is 38.03 kg/m².  No follow-ups on file.           All risks, benefits, alternatives, and indications of colonoscopy and/or Endoscopy procedure have been discussed with the patient. Risks to include perforation of the colon requiring possible surgery or colostomy, risk of bleeding from biopsies or removal of colon tissue, possibility of missing a colon polyp or cancer, or adverse drug reaction.  Benefits to include the diagnosis and management of disease of the colon and rectum. Alternatives to include barium enema, radiographic evaluation, lab testing or no intervention. Pt verbalizes understanding and agrees.     Merissa Nolasco, APRN  6/4/2024  09:35 CDT          If you smoke or use tobacco, 4 minutes reading provided  Steps to Quit Smoking  Smoking tobacco can be harmful to your health and can affect almost every organ in your body. Smoking puts you, and those around you, at risk for developing many serious chronic diseases. Quitting smoking is difficult, but it is one of the best things that you can do for your health. It is never too late to quit.  What are the benefits of quitting smoking?  When you  quit smoking, you lower your risk of developing serious diseases and conditions, such as:  Lung cancer or lung disease, such as COPD.  Heart disease.  Stroke.  Heart attack.  Infertility.  Osteoporosis and bone fractures.  Additionally, symptoms such as coughing, wheezing, and shortness of breath may get better when you quit. You may also find that you get sick less often because your body is stronger at fighting off colds and infections. If you are pregnant, quitting smoking can help to reduce your chances of having a baby of low birth weight.  How do I get ready to quit?  When you decide to quit smoking, create a plan to make sure that you are successful. Before you quit:  Pick a date to quit. Set a date within the next two weeks to give you time to prepare.  Write down the reasons why you are quitting. Keep this list in places where you will see it often, such as on your bathroom mirror or in your car or wallet.  Identify the people, places, things, and activities that make you want to smoke (triggers) and avoid them. Make sure to take these actions:  Throw away all cigarettes at home, at work, and in your car.  Throw away smoking accessories, such as ashtrays and lighters.  Clean your car and make sure to empty the ashtray.  Clean your home, including curtains and carpets.  Tell your family, friends, and coworkers that you are quitting. Support from your loved ones can make quitting easier.  Talk with your health care provider about your options for quitting smoking.  Find out what treatment options are covered by your health insurance.  What strategies can I use to quit smoking?  Talk with your healthcare provider about different strategies to quit smoking. Some strategies include:  Quitting smoking altogether instead of gradually lessening how much you smoke over a period of time. Research shows that quitting “cold turkey” is more successful than gradually quitting.  Attending in-person counseling to help you  build problem-solving skills. You are more likely to have success in quitting if you attend several counseling sessions. Even short sessions of 10 minutes can be effective.  Finding resources and support systems that can help you to quit smoking and remain smoke-free after you quit. These resources are most helpful when you use them often. They can include:  Online chats with a counselor.  Telephone quitlines.  Printed self-help materials.  Support groups or group counseling.  Text messaging programs.  Mobile phone applications.  Taking medicines to help you quit smoking. (If you are pregnant or breastfeeding, talk with your health care provider first.) Some medicines contain nicotine and some do not. Both types of medicines help with cravings, but the medicines that include nicotine help to relieve withdrawal symptoms. Your health care provider may recommend:  Nicotine patches, gum, or lozenges.  Nicotine inhalers or sprays.  Non-nicotine medicine that is taken by mouth.  Talk with your health care provider about combining strategies, such as taking medicines while you are also receiving in-person counseling. Using these two strategies together makes you more likely to succeed in quitting than if you used either strategy on its own.  If you are pregnant or breastfeeding, talk with your health care provider about finding counseling or other support strategies to quit smoking. Do not take medicine to help you quit smoking unless told to do so by your health care provider.  What things can I do to make it easier to quit?  Quitting smoking might feel overwhelming at first, but there is a lot that you can do to make it easier. Take these important actions:  Reach out to your family and friends and ask that they support and encourage you during this time. Call telephone quitlines, reach out to support groups, or work with a counselor for support.  Ask people who smoke to avoid smoking around you.  Avoid places that  trigger you to smoke, such as bars, parties, or smoke-break areas at work.  Spend time around people who do not smoke.  Lessen stress in your life, because stress can be a smoking trigger for some people. To lessen stress, try:  Exercising regularly.  Deep-breathing exercises.  Yoga.  Meditating.  Performing a body scan. This involves closing your eyes, scanning your body from head to toe, and noticing which parts of your body are particularly tense. Purposefully relax the muscles in those areas.  Download or purchase mobile phone or tablet apps (applications) that can help you stick to your quit plan by providing reminders, tips, and encouragement. There are many free apps, such as QuitGuide from the CDC (Centers for Disease Control and Prevention). You can find other support for quitting smoking (smoking cessation) through smokefree.gov and other websites.  How will I feel when I quit smoking?  Within the first 24 hours of quitting smoking, you may start to feel some withdrawal symptoms. These symptoms are usually most noticeable 2-3 days after quitting, but they usually do not last beyond 2-3 weeks. Changes or symptoms that you might experience include:  Mood swings.  Restlessness, anxiety, or irritation.  Difficulty concentrating.  Dizziness.  Strong cravings for sugary foods in addition to nicotine.  Mild weight gain.  Constipation.  Nausea.  Coughing or a sore throat.  Changes in how your medicines work in your body.  A depressed mood.  Difficulty sleeping (insomnia).  After the first 2-3 weeks of quitting, you may start to notice more positive results, such as:  Improved sense of smell and taste.  Decreased coughing and sore throat.  Slower heart rate.  Lower blood pressure.  Clearer skin.  The ability to breathe more easily.  Fewer sick days.  Quitting smoking is very challenging for most people. Do not get discouraged if you are not successful the first time. Some people need to make many attempts to quit  before they achieve long-term success. Do your best to stick to your quit plan, and talk with your health care provider if you have any questions or concerns.  This information is not intended to replace advice given to you by your health care provider. Make sure you discuss any questions you have with your health care provider.  Document Released: 12/12/2002 Document Revised: 08/15/2017 Document Reviewed: 05/03/2016  [employer]vier Interactive Patient Education © 2017 [employer]

## 2024-06-04 NOTE — PROGRESS NOTES
Miranda Elliott  1965    6/4/2024  Chief Complaint   Patient presents with    GI Problem     Epigastric pain     Subjective   HPI  Miranda Elliott is a 58 y.o. female who presents with a complaint of epigastric pain that comes up to her throat and into her neck. Ongoing worsening the past month. It is a burning most of the time like regurgitation/reflux. If she eats anything she feels like she is bloated and full. Food is getting stuck esophageal region. With solid foods it takes time to go down. No nausea or vomiting. No wt loss. No fever chills or sweats  She is already set up for her repeat colonoscopy then symptoms began. She is not taking anything to treat her symptoms.     Past Medical History:   Diagnosis Date    Abnormal Pap smear of cervix     Anxiety     Cervical dysplasia     Cholelithiasis May 2022    Gall bladder removed    Irritable bowel syndrome 1990    Medial meniscus tear     Ovarian cyst     Dont remember the date    PMS (premenstrual syndrome)     Polycystic ovary syndrome     Possibly?    Recurrent pregnancy loss, antepartum condition or complication Dec 1998    Seasonal allergies     Urinary tract infection 1990    Varicella 1979     Past Surgical History:   Procedure Laterality Date    ABDOMINAL SURGERY  2011    Right ovary and tube removed    BACK SURGERY  1998    Lumbar laminectomy L5-S1    BACK SURGERY  2012    lumbar fusion L5-S1    CHOLECYSTECTOMY WITH INTRAOPERATIVE CHOLANGIOGRAM N/A 05/16/2022    Procedure: LAPAROSCOPIC CHOLECYSTECTOMY;  Surgeon: Laura Rod MD;  Location: [facility] OR;  Service: General;  Laterality: N/A;    COLONOSCOPY  12/27/2011    normal exam    D & C HYSTEROSCOPY  1998    DILATATION AND CURETTAGE      ENDOMETRIAL ABLATION  2010    in office    HAND TENDON SURGERY Left 1982    TENDON GRAFT    KNEE ARTHROSCOPY Left 07/22/2021    Procedure: LEFT KNEE ARTHROSCOPIC PARTIAL MEDIAL MENISECTOMY;  Surgeon: Kevin Tovar MD;  Location: [facility] OR;  Service:  Orthopedics;  Laterality: Left;    LAPAROSCOPIC CHOLECYSTECTOMY  May 2022    OVARIAN CYST SURGERY  2010    SALPINGO OOPHORECTOMY Right 2011    TONSILLECTOMY  1970    and adenoids    WISDOM TOOTH EXTRACTION         Outpatient Medications Marked as Taking for the 24 encounter (Office Visit) with Merissa Nolasco APRN   Medication Sig Dispense Refill    ALPRAZolam (XANAX) 1 MG tablet Take 1 tablet by mouth 3 (Three) Times a Day As Needed for Anxiety. 90 tablet 0    nystatin (MYCOSTATIN) 959452 UNIT/GM cream Apply 1 application  topically to the appropriate area as directed 3 (Three) Times a Day. 30 g 3    sodium-potassium-magnesium sulfates (Suprep Bowel Prep Kit) 17.5-3.13-1.6 GM/177ML solution oral solution Take as directed by office instructions provided 354 mL 0    triamcinolone (KENALOG) 0.5 % cream Apply 1 application  topically to the appropriate area as directed 2 (Two) Times a Day. 45 g 3     Allergies   Allergen Reactions    Lortab [Hydrocodone-Acetaminophen] Nausea Only     Pt states couldn't empty bladder and was very nauseated---PT CAN TOLERATE PERCOCET     Social History     Socioeconomic History    Marital status:    Tobacco Use    Smoking status: Former     Current packs/day: 0.00     Average packs/day: 1 pack/day for 28.4 years (28.4 ttl pk-yrs)     Types: Cigarettes     Start date: 1994     Quit date: 2022     Years since quittin.9    Smokeless tobacco: Never   Vaping Use    Vaping status: Former   Substance and Sexual Activity    Alcohol use: Yes     Comment: Drink on a rare occassion    Drug use: Never    Sexual activity: Yes     Partners: Male     Birth control/protection: Post-menopausal     Family History   Problem Relation Age of Onset    Kidney cancer Mother     Irritable bowel syndrome Mother     Diabetes Father         Type 2    No Known Problems Sister     Prostate cancer Maternal Grandfather     Breast cancer Neg Hx     Ovarian cancer Neg Hx     Uterine  cancer Neg Hx     Colon cancer Neg Hx     Melanoma Neg Hx     Colon polyps Neg Hx      Health Maintenance   Topic Date Due    TDAP/TD VACCINES (1 - Tdap) Never done    ZOSTER VACCINE (1 of 2) Never done    ANNUAL PHYSICAL  Never done    COVID-19 Vaccine (2 - 2023-24 season) 09/01/2023    INFLUENZA VACCINE  08/01/2024    BMI FOLLOWUP  11/21/2024    LUNG CANCER SCREENING  03/11/2025    MAMMOGRAM  03/11/2026    PAP SMEAR  03/05/2027    COLORECTAL CANCER SCREENING  02/13/2030    HEPATITIS C SCREENING  Completed    Pneumococcal Vaccine 0-64  Aged Out     Review of Systems   Constitutional:  Negative for activity change, appetite change, chills, diaphoresis, fatigue, fever and unexpected weight change.   HENT:  Negative for ear pain, hearing loss, mouth sores, sore throat, trouble swallowing and voice change.    Eyes: Negative.    Respiratory:  Negative for cough, choking, shortness of breath and wheezing.    Cardiovascular:  Negative for chest pain and palpitations.   Gastrointestinal:  Positive for abdominal pain (epigastric). Negative for blood in stool, constipation, diarrhea, nausea and vomiting.   Endocrine: Negative for cold intolerance and heat intolerance.   Genitourinary:  Negative for decreased urine volume, dysuria, frequency, hematuria and urgency.   Musculoskeletal:  Negative for back pain, gait problem and myalgias.   Skin:  Negative for color change, pallor and rash.   Allergic/Immunologic: Negative for food allergies and immunocompromised state.   Neurological:  Negative for dizziness, tremors, seizures, syncope, weakness, light-headedness, numbness and headaches.   Hematological:  Negative for adenopathy. Does not bruise/bleed easily.   Psychiatric/Behavioral:  Negative for agitation and confusion. The patient is not nervous/anxious.    All other systems reviewed and are negative.    Objective   Vitals:    06/04/24 0850   BP: 124/80   BP Location: Left arm   Pulse: 61   Temp: 97.3 °F (36.3 °C)   TempSrc:  "Temporal   SpO2: 97%   Weight: 106 kg (233 lb 12.8 oz)   Height: 167 cm (65.75\")     Body mass index is 38.03 kg/m².  Physical Exam  Constitutional:       Appearance: She is well-developed.   HENT:      Head: Normocephalic and atraumatic.   Eyes:      Pupils: Pupils are equal, round, and reactive to light.   Neck:      Trachea: No tracheal deviation.   Cardiovascular:      Rate and Rhythm: Normal rate and regular rhythm.      Heart sounds: Normal heart sounds. No murmur heard.     No friction rub. No gallop.   Pulmonary:      Effort: Pulmonary effort is normal. No respiratory distress.      Breath sounds: Normal breath sounds. No wheezing or rales.   Chest:      Chest wall: No tenderness.   Abdominal:      General: Bowel sounds are normal. There is no distension.      Palpations: Abdomen is soft. Abdomen is not rigid.      Tenderness: There is no abdominal tenderness. There is no guarding or rebound.   Musculoskeletal:         General: No tenderness or deformity. Normal range of motion.      Cervical back: Normal range of motion and neck supple.   Skin:     General: Skin is warm and dry.      Coloration: Skin is not pale.      Findings: No rash.   Neurological:      Mental Status: She is alert and oriented to person, place, and time.      Deep Tendon Reflexes: Reflexes are normal and symmetric.   Psychiatric:         Behavior: Behavior normal.         Thought Content: Thought content normal.         Judgment: Judgment normal.       Assessment & Plan   Diagnoses and all orders for this visit:    1. Epigastric pain (Primary)  -     Case Request; Standing  -     Case Request    2. Esophageal dysphagia    3. Nonsmoker    Other orders  -     Implement Anesthesia Orders Day of Procedure; Standing  -     Follow Anesthesia Guidelines / Protocol; Future  -     Obtain Informed Consent; Future  -     Verify Bowel Prep Was Successful; Standing  -     Obtain Informed Consent; Standing  -     omeprazole (priLOSEC) 20 MG capsule; " Take 1 capsule by mouth Daily.  Dispense: 30 capsule; Refill: 11    I discussed non pharmaceutical treatment of gerd.  This includes gradually losing weight to achieve ideal body wt., elevation of the head of bed by 4-6 inches, nothing to eat or drink 3 hours prior to lying down, avoiding tight clothing, stress reduction, tobacco cessation, reduction of alcohol intake, and dietary restrictions (avoiding caffeine, coffee, fatty foods, mints, chocolate, spicy foods and tomato based sauces as much as possible).  Will add Prilosec daily.   I will add endoscopy to her colonoscopy    ESOPHAGOGASTRODUODENOSCOPY WITH ANESTHESIA (N/A), COLONOSCOPY WITH ANESTHESIA (N/A)  Part of this note may be an electronic transcription/translation of spoken language to printed text using the Dragon Dictation System.  Body mass index is 38.03 kg/m².  No follow-ups on file.           All risks, benefits, alternatives, and indications of colonoscopy and/or Endoscopy procedure have been discussed with the patient. Risks to include perforation of the colon requiring possible surgery or colostomy, risk of bleeding from biopsies or removal of colon tissue, possibility of missing a colon polyp or cancer, or adverse drug reaction.  Benefits to include the diagnosis and management of disease of the colon and rectum. Alternatives to include barium enema, radiographic evaluation, lab testing or no intervention. Pt verbalizes understanding and agrees.     Merissa Nolasco, APRN  6/4/2024  09:35 CDT          If you smoke or use tobacco, 4 minutes reading provided  Steps to Quit Smoking  Smoking tobacco can be harmful to your health and can affect almost every organ in your body. Smoking puts you, and those around you, at risk for developing many serious chronic diseases. Quitting smoking is difficult, but it is one of the best things that you can do for your health. It is never too late to quit.  What are the benefits of quitting smoking?  When you  quit smoking, you lower your risk of developing serious diseases and conditions, such as:  Lung cancer or lung disease, such as COPD.  Heart disease.  Stroke.  Heart attack.  Infertility.  Osteoporosis and bone fractures.  Additionally, symptoms such as coughing, wheezing, and shortness of breath may get better when you quit. You may also find that you get sick less often because your body is stronger at fighting off colds and infections. If you are pregnant, quitting smoking can help to reduce your chances of having a baby of low birth weight.  How do I get ready to quit?  When you decide to quit smoking, create a plan to make sure that you are successful. Before you quit:  Pick a date to quit. Set a date within the next two weeks to give you time to prepare.  Write down the reasons why you are quitting. Keep this list in places where you will see it often, such as on your bathroom mirror or in your car or wallet.  Identify the people, places, things, and activities that make you want to smoke (triggers) and avoid them. Make sure to take these actions:  Throw away all cigarettes at home, at work, and in your car.  Throw away smoking accessories, such as ashtrays and lighters.  Clean your car and make sure to empty the ashtray.  Clean your home, including curtains and carpets.  Tell your family, friends, and coworkers that you are quitting. Support from your loved ones can make quitting easier.  Talk with your health care provider about your options for quitting smoking.  Find out what treatment options are covered by your health insurance.  What strategies can I use to quit smoking?  Talk with your healthcare provider about different strategies to quit smoking. Some strategies include:  Quitting smoking altogether instead of gradually lessening how much you smoke over a period of time. Research shows that quitting “cold turkey” is more successful than gradually quitting.  Attending in-person counseling to help you  build problem-solving skills. You are more likely to have success in quitting if you attend several counseling sessions. Even short sessions of 10 minutes can be effective.  Finding resources and support systems that can help you to quit smoking and remain smoke-free after you quit. These resources are most helpful when you use them often. They can include:  Online chats with a counselor.  Telephone quitlines.  Printed self-help materials.  Support groups or group counseling.  Text messaging programs.  Mobile phone applications.  Taking medicines to help you quit smoking. (If you are pregnant or breastfeeding, talk with your health care provider first.) Some medicines contain nicotine and some do not. Both types of medicines help with cravings, but the medicines that include nicotine help to relieve withdrawal symptoms. Your health care provider may recommend:  Nicotine patches, gum, or lozenges.  Nicotine inhalers or sprays.  Non-nicotine medicine that is taken by mouth.  Talk with your health care provider about combining strategies, such as taking medicines while you are also receiving in-person counseling. Using these two strategies together makes you more likely to succeed in quitting than if you used either strategy on its own.  If you are pregnant or breastfeeding, talk with your health care provider about finding counseling or other support strategies to quit smoking. Do not take medicine to help you quit smoking unless told to do so by your health care provider.  What things can I do to make it easier to quit?  Quitting smoking might feel overwhelming at first, but there is a lot that you can do to make it easier. Take these important actions:  Reach out to your family and friends and ask that they support and encourage you during this time. Call telephone quitlines, reach out to support groups, or work with a counselor for support.  Ask people who smoke to avoid smoking around you.  Avoid places that  trigger you to smoke, such as bars, parties, or smoke-break areas at work.  Spend time around people who do not smoke.  Lessen stress in your life, because stress can be a smoking trigger for some people. To lessen stress, try:  Exercising regularly.  Deep-breathing exercises.  Yoga.  Meditating.  Performing a body scan. This involves closing your eyes, scanning your body from head to toe, and noticing which parts of your body are particularly tense. Purposefully relax the muscles in those areas.  Download or purchase mobile phone or tablet apps (applications) that can help you stick to your quit plan by providing reminders, tips, and encouragement. There are many free apps, such as QuitGuide from the CDC (Centers for Disease Control and Prevention). You can find other support for quitting smoking (smoking cessation) through smokefree.gov and other websites.  How will I feel when I quit smoking?  Within the first 24 hours of quitting smoking, you may start to feel some withdrawal symptoms. These symptoms are usually most noticeable 2-3 days after quitting, but they usually do not last beyond 2-3 weeks. Changes or symptoms that you might experience include:  Mood swings.  Restlessness, anxiety, or irritation.  Difficulty concentrating.  Dizziness.  Strong cravings for sugary foods in addition to nicotine.  Mild weight gain.  Constipation.  Nausea.  Coughing or a sore throat.  Changes in how your medicines work in your body.  A depressed mood.  Difficulty sleeping (insomnia).  After the first 2-3 weeks of quitting, you may start to notice more positive results, such as:  Improved sense of smell and taste.  Decreased coughing and sore throat.  Slower heart rate.  Lower blood pressure.  Clearer skin.  The ability to breathe more easily.  Fewer sick days.  Quitting smoking is very challenging for most people. Do not get discouraged if you are not successful the first time. Some people need to make many attempts to quit  before they achieve long-term success. Do your best to stick to your quit plan, and talk with your health care provider if you have any questions or concerns.  This information is not intended to replace advice given to you by your health care provider. Make sure you discuss any questions you have with your health care provider.  Document Released: 12/12/2002 Document Revised: 08/15/2017 Document Reviewed: 05/03/2016  Elsevier Interactive Patient Education © 2017 [employer]

## 2024-06-24 ENCOUNTER — ANESTHESIA EVENT (OUTPATIENT)
Dept: GASTROENTEROLOGY | Facility: HOSPITAL | Age: 59
End: 2024-06-24
Payer: COMMERCIAL

## 2024-06-24 ENCOUNTER — ANESTHESIA (OUTPATIENT)
Dept: GASTROENTEROLOGY | Facility: HOSPITAL | Age: 59
End: 2024-06-24
Payer: COMMERCIAL

## 2024-06-24 ENCOUNTER — HOSPITAL ENCOUNTER (OUTPATIENT)
Facility: HOSPITAL | Age: 59
Setting detail: HOSPITAL OUTPATIENT SURGERY
Discharge: HOME | End: 2024-06-24
Attending: INTERNAL MEDICINE | Admitting: INTERNAL MEDICINE
Payer: COMMERCIAL

## 2024-06-24 VITALS
SYSTOLIC BLOOD PRESSURE: 105 MMHG | WEIGHT: 229 LBS | OXYGEN SATURATION: 94 % | DIASTOLIC BLOOD PRESSURE: 86 MMHG | HEIGHT: 66 IN | RESPIRATION RATE: 27 BRPM | TEMPERATURE: 97 F | HEART RATE: 80 BPM | BODY MASS INDEX: 36.8 KG/M2

## 2024-06-24 DIAGNOSIS — Z12.11 ENCOUNTER FOR SCREENING FOR MALIGNANT NEOPLASM OF COLON: ICD-10-CM

## 2024-06-24 PROCEDURE — 25810000003 SODIUM CHLORIDE 0.9 % SOLUTION: Performed by: ANESTHESIOLOGY

## 2024-06-24 PROCEDURE — 25010000002 PROPOFOL 10 MG/ML EMULSION: Performed by: NURSE ANESTHETIST, CERTIFIED REGISTERED

## 2024-06-24 PROCEDURE — 43239 EGD BIOPSY SINGLE/MULTIPLE: CPT | Performed by: INTERNAL MEDICINE

## 2024-06-24 PROCEDURE — 88305 TISSUE EXAM BY PATHOLOGIST: CPT | Performed by: INTERNAL MEDICINE

## 2024-06-24 PROCEDURE — 45385 COLONOSCOPY W/LESION REMOVAL: CPT | Performed by: INTERNAL MEDICINE

## 2024-06-24 PROCEDURE — 87081 CULTURE SCREEN ONLY: CPT | Performed by: INTERNAL MEDICINE

## 2024-06-24 RX ORDER — SODIUM CHLORIDE 9 MG/ML
500 INJECTION, SOLUTION INTRAVENOUS CONTINUOUS PRN
Status: DISCONTINUED | OUTPATIENT
Start: 2024-06-24 | End: 2024-06-24 | Stop reason: HOSPADM

## 2024-06-24 RX ORDER — LIDOCAINE HYDROCHLORIDE 20 MG/ML
INJECTION, SOLUTION EPIDURAL; INFILTRATION; INTRACAUDAL; PERINEURAL AS NEEDED
Status: DISCONTINUED | OUTPATIENT
Start: 2024-06-24 | End: 2024-06-24 | Stop reason: SURG

## 2024-06-24 RX ORDER — SODIUM CHLORIDE 0.9 % (FLUSH) 0.9 %
10 SYRINGE (ML) INJECTION AS NEEDED
Status: DISCONTINUED | OUTPATIENT
Start: 2024-06-24 | End: 2024-06-24 | Stop reason: HOSPADM

## 2024-06-24 RX ORDER — PROPOFOL 10 MG/ML
VIAL (ML) INTRAVENOUS AS NEEDED
Status: DISCONTINUED | OUTPATIENT
Start: 2024-06-24 | End: 2024-06-24 | Stop reason: SURG

## 2024-06-24 RX ORDER — LIDOCAINE HYDROCHLORIDE 10 MG/ML
0.5 INJECTION, SOLUTION EPIDURAL; INFILTRATION; INTRACAUDAL; PERINEURAL ONCE AS NEEDED
Status: DISCONTINUED | OUTPATIENT
Start: 2024-06-24 | End: 2024-06-24 | Stop reason: HOSPADM

## 2024-06-24 RX ADMIN — SODIUM CHLORIDE 500 ML: 9 INJECTION, SOLUTION INTRAVENOUS at 08:33

## 2024-06-24 RX ADMIN — LIDOCAINE HYDROCHLORIDE 100 MG: 20 INJECTION, SOLUTION EPIDURAL; INFILTRATION; INTRACAUDAL; PERINEURAL at 09:59

## 2024-06-24 RX ADMIN — PROPOFOL 400 MG: 10 INJECTION, EMULSION INTRAVENOUS at 09:59

## 2024-06-24 NOTE — ANESTHESIA POSTPROCEDURE EVALUATION
Patient: Miranda Elliott    Procedure Summary       Date: 06/24/24 Room / Location: [facility] ENDOSCOPY 6 / [facility]    Anesthesia Start: 0957 Anesthesia Stop: 1029    Procedures:       COLONOSCOPY WITH ANESTHESIA      ESOPHAGOGASTRODUODENOSCOPY WITH ANESTHESIA Diagnosis:       Encounter for screening for malignant neoplasm of colon      (Encounter for screening for malignant neoplasm of colon [Z12.11])    Surgeons: Tolu Zuniga MD Provider: Lori Branham CRNA    Anesthesia Type: MAC ASA Status: 2            Anesthesia Type: MAC    Vitals  Vitals Value Taken Time   BP 97/60 06/24/24 1036   Temp     Pulse 73 06/24/24 1041   Resp 18 06/24/24 1030   SpO2 100 % 06/24/24 1041   Vitals shown include unfiled device data.        Post Anesthesia Care and Evaluation    Patient location during evaluation: PHASE II  Patient participation: complete - patient participated  Level of consciousness: awake and alert  Pain management: adequate    Airway patency: patent  Anesthetic complications: No anesthetic complications  PONV Status: none  Cardiovascular status: acceptable  Respiratory status: acceptable  Hydration status: acceptable

## 2024-06-24 NOTE — ANESTHESIA PREPROCEDURE EVALUATION
Anesthesia Evaluation     Patient summary reviewed   no history of anesthetic complications:   NPO Solid Status: > 8 hours             Airway   Mallampati: II  TM distance: >3 FB  No difficulty expected  Dental - normal exam     Pulmonary    (+) a smoker Former,  (-) asthma, sleep apnea  Cardiovascular   Exercise tolerance: good (4-7 METS)    (-) hypertension, hyperlipidemia      Neuro/Psych- negative ROS  (-) seizures, TIA, CVA  GI/Hepatic/Renal/Endo    (+) obesity, GERD  (-) liver disease, no renal disease, diabetes    Musculoskeletal     Abdominal    Substance History      OB/GYN          Other                    Anesthesia Plan    ASA 2     MAC       Anesthetic plan, risks, benefits, and alternatives have been provided, discussed and informed consent has been obtained with: patient.    CODE STATUS:

## 2024-06-25 LAB
CYTO UR: NORMAL
LAB AP CASE REPORT: NORMAL
Lab: NORMAL
PATH REPORT.FINAL DX SPEC: NORMAL
PATH REPORT.GROSS SPEC: NORMAL
UREASE TISS QL: NEGATIVE

## 2024-06-27 ENCOUNTER — TELEPHONE (OUTPATIENT)
Dept: GASTROENTEROLOGY | Facility: CLINIC | Age: 59
End: 2024-06-27
Payer: COMMERCIAL

## 2024-06-27 DIAGNOSIS — R93.3 ABNORMAL ENDOSCOPY OF UPPER GASTROINTESTINAL TRACT: Primary | ICD-10-CM

## 2024-06-27 RX ORDER — OMEPRAZOLE 20 MG/1
20 CAPSULE, DELAYED RELEASE ORAL DAILY
COMMUNITY

## 2024-06-27 RX ORDER — ALPRAZOLAM 1 MG/1
1 TABLET ORAL NIGHTLY PRN
COMMUNITY

## 2024-06-27 RX ORDER — NYSTATIN 100000 U/G
CREAM TOPICAL 2 TIMES DAILY
COMMUNITY

## 2024-06-27 RX ORDER — ONDANSETRON 8 MG/1
8 TABLET, ORALLY DISINTEGRATING ORAL EVERY 8 HOURS PRN
COMMUNITY

## 2024-06-27 RX ORDER — TRIAMCINOLONE ACETONIDE 5 MG/G
CREAM TOPICAL 3 TIMES DAILY
COMMUNITY

## 2024-06-27 NOTE — TELEPHONE ENCOUNTER
----- Message from Tolu Zuniga sent at 6/25/2024  2:28 PM CDT -----  Refer to [facility] for EUS.  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

## 2024-07-12 ENCOUNTER — TELEPHONE (OUTPATIENT)
Dept: GASTROENTEROLOGY | Age: 59
End: 2024-07-12

## 2024-07-12 NOTE — TELEPHONE ENCOUNTER
Received referral from Dr. Jim for Dr. Viveros to review who ordered to schedule EUS (radial) - SUBMUCOSAL NODULE IN STOMACH -I got her scheduled for 7/31/24@1100.   She was going to check with her insurance to see if it is covered as she has [employer].  Patient called back the next day - said it is only covered as out of network and her out of pocket would b e $3000 - $[phone].  She called back to cancel, she is going to double check with our billing dept, but if nothing changes, she does not want to R/S      CX PER PATIIENT - SHE HAS [employer] INSURANCE AND IS NOT COVERED

## 2024-08-27 DIAGNOSIS — R93.3 ABNORMAL ENDOSCOPY OF UPPER GASTROINTESTINAL TRACT: Primary | ICD-10-CM

## 2024-09-23 ENCOUNTER — HOSPITAL ENCOUNTER (OUTPATIENT)
Dept: CT IMAGING | Facility: HOSPITAL | Age: 59
Discharge: HOME | End: 2024-09-23
Admitting: INTERNAL MEDICINE
Payer: COMMERCIAL

## 2024-09-23 PROCEDURE — 25510000001 IOPAMIDOL 61 % SOLUTION: Performed by: INTERNAL MEDICINE

## 2024-09-23 PROCEDURE — 74177 CT ABD & PELVIS W/CONTRAST: CPT

## 2024-09-23 RX ORDER — IOPAMIDOL 612 MG/ML
100 INJECTION, SOLUTION INTRAVASCULAR
Status: COMPLETED | OUTPATIENT
Start: 2024-09-23 | End: 2024-09-23

## 2024-09-23 RX ADMIN — IOPAMIDOL 100 ML: 612 INJECTION, SOLUTION INTRAVENOUS at 08:20

## (undated) DEVICE — SYS CLS SKIN PREMIERPRO EXOFINFUSION 22CM

## (undated) DEVICE — TRAP FLD MINIVAC MEGADYNE 100ML

## (undated) DEVICE — GLV SURG SENSICARE W/ALOE PF LF 7 STRL

## (undated) DEVICE — GLV SURG DERMASSURE GRN LF PF 8.0

## (undated) DEVICE — SUCTION MAT (LOW PROFILE), 50X34: Brand: NEPTUNE

## (undated) DEVICE — ENDOPATH PNEUMONEEDLE INSUFFLATION NEEDLES WITH LUER LOCK CONNECTORS 120MM: Brand: ENDOPATH

## (undated) DEVICE — SUT VIC 0 SUTUPAK TIES 18IN J906G

## (undated) DEVICE — FRCP BX RADJAW4 NDL 2.8 240 STD OG

## (undated) DEVICE — CONMED SCOPE SAVER BITE BLOCK, 20X27 MM: Brand: SCOPE SAVER

## (undated) DEVICE — ENDOPATH XCEL DILATING TIP TROCARS WITH STABILITY SLEEVES: Brand: ENDOPATH XCEL

## (undated) DEVICE — ANTIBACTERIAL UNDYED BRAIDED (POLYGLACTIN 910), SYNTHETIC ABSORBABLE SUTURE: Brand: COATED VICRYL

## (undated) DEVICE — THE CHANNEL CLEANING BRUSH IS A NYLON FLEXI BRUSH ATTACHED TO A FLEXIBLE PLASTIC SHEATH DESIGNED TO SAFELY REMOVE DEBRIS FROM FLEXIBLE ENDOSCOPES.

## (undated) DEVICE — Device: Brand: DEFENDO AIR/WATER/SUCTION AND BIOPSY VALVE

## (undated) DEVICE — CVR BRD ARM 13X30

## (undated) DEVICE — TBG ARTHRO FLOWSTEADY/ST DISP

## (undated) DEVICE — PK TURNOVER RM ADV

## (undated) DEVICE — SYR LL TP 10ML STRL

## (undated) DEVICE — GLV SURG SENSICARE W/ALOE PF LF 6.5 STRL

## (undated) DEVICE — TP NDL SCORPION MULTIFIRE

## (undated) DEVICE — MONOPOLAR METZENBAUM SCISSOR, MINI BLADE TIP, DISPOSABLE: Brand: MONOPOLAR METZENBAUM SCISSOR, MINI BLADE TIP, DISPOSABLE

## (undated) DEVICE — PAD LAP CHOLE: Brand: MEDLINE INDUSTRIES, INC.

## (undated) DEVICE — ELECTRD L HK EZ CLN 33CM

## (undated) DEVICE — GLV SURG BIOGEL LTX PF 6 1/2

## (undated) DEVICE — GLV SURG TRIUMPH MICRO PF LTX 7.5 STRL

## (undated) DEVICE — ENDOPATH XCEL WITH OPTIVIEW TECHNOLOGY UNIVERSAL TROCAR STABILITY SLEEVES: Brand: ENDOPATH XCEL OPTIVIEW

## (undated) DEVICE — 3M™ STERI-STRIP™ REINFORCED ADHESIVE SKIN CLOSURES, R1547, 1/2 IN X 4 IN (12 MM X 100 MM), 6 STRIPS/ENVELOPE: Brand: 3M™ STERI-STRIP™

## (undated) DEVICE — PENCL ES MEGADINE EZ/CLEAN BUTN W/HOLSTR 10FT

## (undated) DEVICE — GLV SURG BIOGEL LTX PF 7 1/2

## (undated) DEVICE — DISPOSABLE TOURNIQUET CUFF SINGLE BLADDER, SINGLE PORT AND QUICK CONNECT CONNECTOR: Brand: COLOR CUFF

## (undated) DEVICE — DRSNG GZ CURAD XEROFORM NONADHS 5X9IN STRL

## (undated) DEVICE — ENDOPATH XCEL WITH OPTIVIEW TECHNOLOGY DILATING TIP TROCARS WITH STABILITY SLEEVES: Brand: ENDOPATH XCEL OPTIVIEW

## (undated) DEVICE — PROB ABLAT SERFAS ENERGY 90S 3.5MM

## (undated) DEVICE — VAGINAL PREP TRAY: Brand: MEDLINE INDUSTRIES, INC.

## (undated) DEVICE — ENDOPOUCH RETRIEVER SPECIMEN RETRIEVAL BAGS: Brand: ENDOPOUCH RETRIEVER

## (undated) DEVICE — [TOMCAT CUTTER, ARTHROSCOPIC SHAVER BLADE,  DO NOT RESTERILIZE,  DO NOT USE IF PACKAGE IS DAMAGED,  KEEP DRY,  KEEP AWAY FROM SUNLIGHT]: Brand: FORMULA

## (undated) DEVICE — 4-PORT MANIFOLD: Brand: NEPTUNE 2

## (undated) DEVICE — 2, DISPOSABLE SUCTION/IRRIGATOR WITHOUT DISPOSABLE TIP: Brand: STRYKEFLOW

## (undated) DEVICE — CUFF,BP,DISP,1 TUBE,ADULT,HP: Brand: MEDLINE

## (undated) DEVICE — PDS II VLT 0 107CM AG ST3: Brand: ENDOLOOP

## (undated) DEVICE — DRILL 4MM FOR 4.5MM ANCHOR SL-PLUS

## (undated) DEVICE — UNDERCAST PADDING: Brand: DEROYAL

## (undated) DEVICE — YANKAUER,BULB TIP WITH VENT: Brand: ARGYLE

## (undated) DEVICE — TOWEL,OR,DSP,ST,BLUE,STD,4/PK,20PK/CS: Brand: MEDLINE

## (undated) DEVICE — MASK,OXYGEN,MED CONC,ADLT,7' TUB, UC: Brand: PENDING

## (undated) DEVICE — SENSR O2 OXIMAX FNGR A/ 18IN NONSTR

## (undated) DEVICE — PK KN ARTHSCP 30

## (undated) DEVICE — THE SINGLE USE ETRAP – POLYP TRAP IS USED FOR SUCTION RETRIEVAL OF ENDOSCOPICALLY REMOVED POLYPS.: Brand: ETRAP

## (undated) DEVICE — SNAR POLYP CAPTIVATOR RND STFF 2.4 240CM 10MM 1P/U

## (undated) DEVICE — IMMOB KN 3PNL DLX CANVS 19IN BLU